# Patient Record
Sex: MALE | Race: WHITE | Employment: FULL TIME | URBAN - METROPOLITAN AREA
[De-identification: names, ages, dates, MRNs, and addresses within clinical notes are randomized per-mention and may not be internally consistent; named-entity substitution may affect disease eponyms.]

---

## 2017-02-23 ENCOUNTER — OFFICE VISIT (OUTPATIENT)
Dept: FAMILY MEDICINE CLINIC | Age: 47
End: 2017-02-23

## 2017-02-23 VITALS
SYSTOLIC BLOOD PRESSURE: 114 MMHG | HEIGHT: 73 IN | DIASTOLIC BLOOD PRESSURE: 78 MMHG | BODY MASS INDEX: 38.97 KG/M2 | WEIGHT: 294 LBS | TEMPERATURE: 97.1 F | HEART RATE: 100 BPM

## 2017-02-23 DIAGNOSIS — R53.83 MALAISE AND FATIGUE: ICD-10-CM

## 2017-02-23 DIAGNOSIS — G47.00 INSOMNIA, UNSPECIFIED TYPE: ICD-10-CM

## 2017-02-23 DIAGNOSIS — J20.9 ACUTE BRONCHITIS, UNSPECIFIED ORGANISM: ICD-10-CM

## 2017-02-23 DIAGNOSIS — R09.81 NASAL CONGESTION: Primary | ICD-10-CM

## 2017-02-23 DIAGNOSIS — R53.81 MALAISE AND FATIGUE: ICD-10-CM

## 2017-02-23 DIAGNOSIS — Z13.6 SCREENING FOR CARDIOVASCULAR CONDITION: ICD-10-CM

## 2017-02-23 DIAGNOSIS — Z12.5 PROSTATE CANCER SCREENING: ICD-10-CM

## 2017-02-23 PROCEDURE — 99213 OFFICE O/P EST LOW 20 MIN: CPT | Performed by: FAMILY MEDICINE

## 2017-02-23 RX ORDER — METHYLPREDNISOLONE 4 MG/1
TABLET ORAL
Qty: 1 KIT | Refills: 0 | Status: SHIPPED | OUTPATIENT
Start: 2017-02-23 | End: 2017-04-15 | Stop reason: ALTCHOICE

## 2017-02-23 RX ORDER — AZITHROMYCIN 250 MG/1
TABLET, FILM COATED ORAL
Qty: 1 PACKET | Refills: 0 | Status: SHIPPED | OUTPATIENT
Start: 2017-02-23 | End: 2017-03-02 | Stop reason: ALTCHOICE

## 2017-02-23 ASSESSMENT — ENCOUNTER SYMPTOMS
SORE THROAT: 0
EYE DISCHARGE: 1
CONSTIPATION: 0
ABDOMINAL PAIN: 0
COUGH: 1
EYE ITCHING: 1
DIARRHEA: 0
RHINORRHEA: 1

## 2017-02-27 DIAGNOSIS — Z12.5 PROSTATE CANCER SCREENING: ICD-10-CM

## 2017-02-27 DIAGNOSIS — R53.83 MALAISE AND FATIGUE: ICD-10-CM

## 2017-02-27 DIAGNOSIS — Z13.6 SCREENING FOR CARDIOVASCULAR CONDITION: ICD-10-CM

## 2017-02-27 DIAGNOSIS — R53.81 MALAISE AND FATIGUE: ICD-10-CM

## 2017-02-27 LAB
ALBUMIN SERPL-MCNC: 4.5 G/DL (ref 3.9–4.9)
ALP BLD-CCNC: 65 U/L (ref 35–104)
ALT SERPL-CCNC: 31 U/L (ref 0–41)
ANION GAP SERPL CALCULATED.3IONS-SCNC: 10 MEQ/L (ref 7–13)
AST SERPL-CCNC: 18 U/L (ref 0–40)
BASOPHILS ABSOLUTE: 0 K/UL (ref 0–0.2)
BASOPHILS RELATIVE PERCENT: 0.5 %
BILIRUB SERPL-MCNC: 0.5 MG/DL (ref 0–1.2)
BUN BLDV-MCNC: 18 MG/DL (ref 6–20)
CALCIUM SERPL-MCNC: 9.7 MG/DL (ref 8.6–10.2)
CHLORIDE BLD-SCNC: 98 MEQ/L (ref 98–107)
CHOLESTEROL, TOTAL: 202 MG/DL (ref 0–199)
CO2: 29 MEQ/L (ref 22–29)
CREAT SERPL-MCNC: 1.05 MG/DL (ref 0.7–1.2)
EOSINOPHILS ABSOLUTE: 0 K/UL (ref 0–0.7)
EOSINOPHILS RELATIVE PERCENT: 0.5 %
GFR AFRICAN AMERICAN: >60
GFR NON-AFRICAN AMERICAN: >60
GLOBULIN: 3 G/DL (ref 2.3–3.5)
GLUCOSE BLD-MCNC: 81 MG/DL (ref 74–109)
HCT VFR BLD CALC: 47.5 % (ref 42–52)
HDLC SERPL-MCNC: 40 MG/DL (ref 40–59)
HEMOGLOBIN: 16 G/DL (ref 14–18)
LDL CHOLESTEROL CALCULATED: 111 MG/DL (ref 0–129)
LYMPHOCYTES ABSOLUTE: 1.9 K/UL (ref 1–4.8)
LYMPHOCYTES RELATIVE PERCENT: 18.9 %
MCH RBC QN AUTO: 27.8 PG (ref 27–31.3)
MCHC RBC AUTO-ENTMCNC: 33.7 % (ref 33–37)
MCV RBC AUTO: 82.5 FL (ref 80–100)
MONOCYTES ABSOLUTE: 1 K/UL (ref 0.2–0.8)
MONOCYTES RELATIVE PERCENT: 9.7 %
NEUTROPHILS ABSOLUTE: 6.9 K/UL (ref 1.4–6.5)
NEUTROPHILS RELATIVE PERCENT: 70.4 %
PDW BLD-RTO: 14.2 % (ref 11.5–14.5)
PLATELET # BLD: 207 K/UL (ref 130–400)
POTASSIUM SERPL-SCNC: 4.2 MEQ/L (ref 3.5–5.1)
PROSTATE SPECIFIC ANTIGEN: 0.28 NG/ML
RBC # BLD: 5.76 M/UL (ref 4.7–6.1)
SLIDE REVIEW: ABNORMAL
SODIUM BLD-SCNC: 137 MEQ/L (ref 132–144)
T4 FREE: 0.89 NG/DL (ref 0.93–1.7)
TOTAL PROTEIN: 7.5 G/DL (ref 6.4–8.1)
TRIGL SERPL-MCNC: 255 MG/DL (ref 0–200)
WBC # BLD: 9.8 K/UL (ref 4.8–10.8)

## 2017-02-27 RX ORDER — MELOXICAM 15 MG/1
TABLET ORAL
Qty: 90 TABLET | Refills: 2 | Status: SHIPPED | OUTPATIENT
Start: 2017-02-27

## 2017-03-02 ENCOUNTER — OFFICE VISIT (OUTPATIENT)
Dept: FAMILY MEDICINE CLINIC | Age: 47
End: 2017-03-02

## 2017-03-02 VITALS
HEIGHT: 73 IN | RESPIRATION RATE: 16 BRPM | DIASTOLIC BLOOD PRESSURE: 82 MMHG | HEART RATE: 88 BPM | SYSTOLIC BLOOD PRESSURE: 118 MMHG | BODY MASS INDEX: 39.36 KG/M2 | TEMPERATURE: 97.3 F | WEIGHT: 297 LBS

## 2017-03-02 DIAGNOSIS — E03.9 ACQUIRED HYPOTHYROIDISM: ICD-10-CM

## 2017-03-02 DIAGNOSIS — E78.5 HYPERLIPIDEMIA, UNSPECIFIED HYPERLIPIDEMIA TYPE: Primary | ICD-10-CM

## 2017-03-02 DIAGNOSIS — E29.1 HYPOGONADISM, MALE: ICD-10-CM

## 2017-03-02 LAB — TESTOSTERONE TOTAL-MALE: 204 NG/DL (ref 300–890)

## 2017-03-02 PROCEDURE — 99213 OFFICE O/P EST LOW 20 MIN: CPT | Performed by: FAMILY MEDICINE

## 2017-03-02 RX ORDER — LEVOTHYROXINE SODIUM 0.05 MG/1
50 TABLET ORAL DAILY
Qty: 30 TABLET | Refills: 3 | Status: SHIPPED | OUTPATIENT
Start: 2017-03-02 | End: 2017-04-10

## 2017-03-02 ASSESSMENT — ENCOUNTER SYMPTOMS
ABDOMINAL PAIN: 0
SHORTNESS OF BREATH: 0
SINUS PRESSURE: 0
SORE THROAT: 0
DIARRHEA: 0
EYE DISCHARGE: 0
EYE ITCHING: 0
CONSTIPATION: 0
COUGH: 0

## 2017-03-02 ASSESSMENT — PATIENT HEALTH QUESTIONNAIRE - PHQ9
SUM OF ALL RESPONSES TO PHQ9 QUESTIONS 1 & 2: 0
2. FEELING DOWN, DEPRESSED OR HOPELESS: 0
1. LITTLE INTEREST OR PLEASURE IN DOING THINGS: 0
SUM OF ALL RESPONSES TO PHQ QUESTIONS 1-9: 0

## 2017-03-07 ENCOUNTER — NURSE ONLY (OUTPATIENT)
Dept: FAMILY MEDICINE CLINIC | Age: 47
End: 2017-03-07

## 2017-03-07 DIAGNOSIS — E29.1 HYPOGONADISM, MALE: Primary | ICD-10-CM

## 2017-03-07 PROCEDURE — 96372 THER/PROPH/DIAG INJ SC/IM: CPT | Performed by: FAMILY MEDICINE

## 2017-03-07 RX ORDER — TESTOSTERONE CYPIONATE 200 MG/ML
200 INJECTION INTRAMUSCULAR ONCE
Status: COMPLETED | OUTPATIENT
Start: 2017-03-07 | End: 2017-03-07

## 2017-03-07 RX ORDER — TESTOSTERONE CYPIONATE 200 MG/ML
200 INJECTION INTRAMUSCULAR
COMMUNITY
End: 2017-09-12 | Stop reason: SDUPTHER

## 2017-03-07 RX ADMIN — TESTOSTERONE CYPIONATE 200 MG: 200 INJECTION INTRAMUSCULAR at 12:02

## 2017-03-15 ENCOUNTER — TELEPHONE (OUTPATIENT)
Dept: FAMILY MEDICINE CLINIC | Age: 47
End: 2017-03-15

## 2017-03-31 ENCOUNTER — NURSE ONLY (OUTPATIENT)
Dept: FAMILY MEDICINE CLINIC | Age: 47
End: 2017-03-31

## 2017-03-31 DIAGNOSIS — E29.1 HYPOGONADISM, MALE: Primary | ICD-10-CM

## 2017-03-31 PROCEDURE — 96372 THER/PROPH/DIAG INJ SC/IM: CPT | Performed by: FAMILY MEDICINE

## 2017-03-31 RX ORDER — TESTOSTERONE CYPIONATE 200 MG/ML
200 INJECTION INTRAMUSCULAR ONCE
Status: COMPLETED | OUTPATIENT
Start: 2017-03-31 | End: 2017-03-31

## 2017-03-31 RX ADMIN — TESTOSTERONE CYPIONATE 200 MG: 200 INJECTION INTRAMUSCULAR at 13:24

## 2017-04-08 DIAGNOSIS — E03.9 ACQUIRED HYPOTHYROIDISM: ICD-10-CM

## 2017-04-08 DIAGNOSIS — E29.1 MALE HYPOGONADISM: Primary | ICD-10-CM

## 2017-04-08 DIAGNOSIS — E29.1 MALE HYPOGONADISM: ICD-10-CM

## 2017-04-08 LAB — T4 FREE: 1.04 NG/DL (ref 0.93–1.7)

## 2017-04-10 LAB — TESTOSTERONE TOTAL-MALE: 604 NG/DL (ref 300–890)

## 2017-04-10 RX ORDER — LEVOTHYROXINE SODIUM 0.07 MG/1
75 TABLET ORAL DAILY
Qty: 30 TABLET | Refills: 3 | Status: SHIPPED | OUTPATIENT
Start: 2017-04-10 | End: 2017-05-12 | Stop reason: SDUPTHER

## 2017-04-15 ENCOUNTER — OFFICE VISIT (OUTPATIENT)
Dept: FAMILY MEDICINE CLINIC | Age: 47
End: 2017-04-15

## 2017-04-15 VITALS
HEART RATE: 96 BPM | RESPIRATION RATE: 12 BRPM | BODY MASS INDEX: 40.77 KG/M2 | HEIGHT: 72 IN | DIASTOLIC BLOOD PRESSURE: 84 MMHG | TEMPERATURE: 97.8 F | WEIGHT: 301 LBS | SYSTOLIC BLOOD PRESSURE: 138 MMHG

## 2017-04-15 DIAGNOSIS — R09.89 CHEST CONGESTION: ICD-10-CM

## 2017-04-15 DIAGNOSIS — E29.1 HYPOGONADISM, MALE: Primary | ICD-10-CM

## 2017-04-15 DIAGNOSIS — B07.0 PLANTAR WART: ICD-10-CM

## 2017-04-15 PROCEDURE — 99213 OFFICE O/P EST LOW 20 MIN: CPT | Performed by: FAMILY MEDICINE

## 2017-04-15 ASSESSMENT — ENCOUNTER SYMPTOMS
COUGH: 0
NAUSEA: 0
DIARRHEA: 0
ABDOMINAL PAIN: 0
EYES NEGATIVE: 1
CONSTIPATION: 0
SHORTNESS OF BREATH: 0

## 2017-04-25 ENCOUNTER — HOSPITAL ENCOUNTER (EMERGENCY)
Age: 47
Discharge: HOME | End: 2017-04-25
Attending: EMERGENCY MEDICINE
Payer: COMMERCIAL

## 2017-04-25 VITALS
HEART RATE: 81 BPM | OXYGEN SATURATION: 96 % | WEIGHT: 305 LBS | BODY MASS INDEX: 40.42 KG/M2 | DIASTOLIC BLOOD PRESSURE: 101 MMHG | RESPIRATION RATE: 19 BRPM | HEIGHT: 73 IN | TEMPERATURE: 97.9 F | SYSTOLIC BLOOD PRESSURE: 164 MMHG

## 2017-04-25 DIAGNOSIS — M25.562 ACUTE PAIN OF LEFT KNEE: Primary | ICD-10-CM

## 2017-04-25 PROCEDURE — 99283 EMERGENCY DEPT VISIT LOW MDM: CPT

## 2017-04-25 PROCEDURE — 6360000002 HC RX W HCPCS: Performed by: EMERGENCY MEDICINE

## 2017-04-25 PROCEDURE — 6360000002 HC RX W HCPCS

## 2017-04-25 PROCEDURE — 96372 THER/PROPH/DIAG INJ SC/IM: CPT

## 2017-04-25 PROCEDURE — 6370000000 HC RX 637 (ALT 250 FOR IP): Performed by: EMERGENCY MEDICINE

## 2017-04-25 RX ORDER — ONDANSETRON 4 MG/1
TABLET, FILM COATED ORAL
Status: COMPLETED
Start: 2017-04-25 | End: 2017-04-25

## 2017-04-25 RX ORDER — MORPHINE SULFATE 4 MG/ML
4 INJECTION, SOLUTION INTRAMUSCULAR; INTRAVENOUS ONCE
Status: COMPLETED | OUTPATIENT
Start: 2017-04-25 | End: 2017-04-25

## 2017-04-25 RX ORDER — ONDANSETRON 2 MG/ML
4 INJECTION INTRAMUSCULAR; INTRAVENOUS ONCE
Status: DISCONTINUED | OUTPATIENT
Start: 2017-04-25 | End: 2017-04-25 | Stop reason: HOSPADM

## 2017-04-25 RX ORDER — MORPHINE SULFATE 15 MG/1
15 TABLET ORAL ONCE
Status: COMPLETED | OUTPATIENT
Start: 2017-04-25 | End: 2017-04-25

## 2017-04-25 RX ORDER — MORPHINE SULFATE 15 MG/1
15 TABLET ORAL EVERY 4 HOURS PRN
Qty: 12 TABLET | Refills: 0 | Status: SHIPPED | OUTPATIENT
Start: 2017-04-25 | End: 2017-05-02

## 2017-04-25 RX ORDER — ONDANSETRON 4 MG/1
4 TABLET, FILM COATED ORAL ONCE
Status: COMPLETED | OUTPATIENT
Start: 2017-04-25 | End: 2017-04-25

## 2017-04-25 RX ADMIN — ONDANSETRON 4 MG: 4 TABLET, FILM COATED ORAL at 01:38

## 2017-04-25 RX ADMIN — MORPHINE SULFATE 4 MG: 4 INJECTION, SOLUTION INTRAMUSCULAR; INTRAVENOUS at 01:40

## 2017-04-25 RX ADMIN — MORPHINE SULFATE 15 MG: 15 TABLET ORAL at 02:41

## 2017-04-25 ASSESSMENT — ENCOUNTER SYMPTOMS
ABDOMINAL PAIN: 0
BACK PAIN: 0
SHORTNESS OF BREATH: 0

## 2017-04-25 ASSESSMENT — PAIN SCALES - GENERAL
PAINLEVEL_OUTOF10: 8
PAINLEVEL_OUTOF10: 10
PAINLEVEL_OUTOF10: 10
PAINLEVEL_OUTOF10: 8

## 2017-04-25 ASSESSMENT — PAIN DESCRIPTION - ORIENTATION: ORIENTATION: LEFT;POSTERIOR

## 2017-04-25 ASSESSMENT — PAIN DESCRIPTION - LOCATION: LOCATION: KNEE

## 2017-04-27 DIAGNOSIS — M25.562 LEFT KNEE PAIN, UNSPECIFIED CHRONICITY: Primary | ICD-10-CM

## 2017-05-01 ENCOUNTER — OFFICE VISIT (OUTPATIENT)
Dept: ORTHOPEDIC SURGERY | Age: 47
End: 2017-05-01
Payer: COMMERCIAL

## 2017-05-01 VITALS — BODY MASS INDEX: 40.42 KG/M2 | WEIGHT: 305 LBS | HEIGHT: 73 IN

## 2017-05-01 DIAGNOSIS — S76.312A HAMSTRING STRAIN, LEFT, INITIAL ENCOUNTER: Primary | ICD-10-CM

## 2017-05-01 PROCEDURE — 99203 OFFICE O/P NEW LOW 30 MIN: CPT | Performed by: STUDENT IN AN ORGANIZED HEALTH CARE EDUCATION/TRAINING PROGRAM

## 2017-05-01 RX ORDER — METHYLPREDNISOLONE 4 MG/1
TABLET ORAL
Qty: 1 KIT | Refills: 0 | Status: SHIPPED | OUTPATIENT
Start: 2017-05-01 | End: 2017-05-07

## 2017-05-01 RX ORDER — IBUPROFEN 800 MG/1
800 TABLET ORAL EVERY 8 HOURS PRN
Qty: 90 TABLET | Refills: 0 | Status: SHIPPED | OUTPATIENT
Start: 2017-05-01 | End: 2017-05-25

## 2017-05-12 ENCOUNTER — OFFICE VISIT (OUTPATIENT)
Dept: INTERNAL MEDICINE | Age: 47
End: 2017-05-12
Payer: COMMERCIAL

## 2017-05-12 VITALS
HEIGHT: 73 IN | BODY MASS INDEX: 38.7 KG/M2 | DIASTOLIC BLOOD PRESSURE: 68 MMHG | OXYGEN SATURATION: 97 % | RESPIRATION RATE: 13 BRPM | WEIGHT: 292 LBS | HEART RATE: 85 BPM | SYSTOLIC BLOOD PRESSURE: 122 MMHG

## 2017-05-12 DIAGNOSIS — E03.9 ACQUIRED HYPOTHYROIDISM: Primary | ICD-10-CM

## 2017-05-12 DIAGNOSIS — E78.5 HYPERLIPIDEMIA, UNSPECIFIED HYPERLIPIDEMIA TYPE: ICD-10-CM

## 2017-05-12 DIAGNOSIS — J30.2 SEASONAL ALLERGIC RHINITIS, UNSPECIFIED ALLERGIC RHINITIS TRIGGER: ICD-10-CM

## 2017-05-12 PROCEDURE — 99203 OFFICE O/P NEW LOW 30 MIN: CPT | Performed by: INTERNAL MEDICINE

## 2017-05-12 RX ORDER — LEVOTHYROXINE SODIUM 0.07 MG/1
75 TABLET ORAL DAILY
Qty: 30 TABLET | Refills: 3 | Status: SHIPPED | OUTPATIENT
Start: 2017-05-12 | End: 2017-09-12 | Stop reason: SDUPTHER

## 2017-05-12 ASSESSMENT — ENCOUNTER SYMPTOMS: WHEEZING: 1

## 2017-05-15 ENCOUNTER — OFFICE VISIT (OUTPATIENT)
Dept: ORTHOPEDIC SURGERY | Age: 47
End: 2017-05-15
Payer: COMMERCIAL

## 2017-05-15 VITALS — WEIGHT: 292 LBS | HEIGHT: 73 IN | BODY MASS INDEX: 38.7 KG/M2

## 2017-05-15 DIAGNOSIS — S76.312A HAMSTRING STRAIN, LEFT, INITIAL ENCOUNTER: Primary | ICD-10-CM

## 2017-05-15 DIAGNOSIS — M25.562 LEFT KNEE PAIN, UNSPECIFIED CHRONICITY: ICD-10-CM

## 2017-05-15 PROCEDURE — 99213 OFFICE O/P EST LOW 20 MIN: CPT | Performed by: STUDENT IN AN ORGANIZED HEALTH CARE EDUCATION/TRAINING PROGRAM

## 2017-05-16 ASSESSMENT — ENCOUNTER SYMPTOMS
CHOKING: 0
VOMITING: 0
DIARRHEA: 0
APNEA: 0

## 2017-05-17 ENCOUNTER — HOSPITAL ENCOUNTER (OUTPATIENT)
Dept: PHYSICAL THERAPY | Age: 47
Setting detail: THERAPIES SERIES
Discharge: HOME | End: 2017-05-17
Payer: COMMERCIAL

## 2017-05-17 PROCEDURE — 97110 THERAPEUTIC EXERCISES: CPT

## 2017-05-17 PROCEDURE — 97161 PT EVAL LOW COMPLEX 20 MIN: CPT

## 2017-05-17 PROCEDURE — 97035 APP MDLTY 1+ULTRASOUND EA 15: CPT

## 2017-05-19 ENCOUNTER — OFFICE VISIT (OUTPATIENT)
Dept: INTERNAL MEDICINE | Age: 47
End: 2017-05-19
Payer: COMMERCIAL

## 2017-05-19 ENCOUNTER — HOSPITAL ENCOUNTER (OUTPATIENT)
Dept: PHYSICAL THERAPY | Age: 47
Setting detail: THERAPIES SERIES
Discharge: HOME | End: 2017-05-19
Payer: COMMERCIAL

## 2017-05-19 DIAGNOSIS — E29.1 HYPOGONADISM, MALE: Primary | ICD-10-CM

## 2017-05-19 PROCEDURE — 97110 THERAPEUTIC EXERCISES: CPT

## 2017-05-19 PROCEDURE — 96372 THER/PROPH/DIAG INJ SC/IM: CPT | Performed by: INTERNAL MEDICINE

## 2017-05-19 RX ORDER — TESTOSTERONE CYPIONATE 200 MG/ML
200 INJECTION INTRAMUSCULAR ONCE
Status: COMPLETED | OUTPATIENT
Start: 2017-05-19 | End: 2017-05-19

## 2017-05-19 RX ADMIN — TESTOSTERONE CYPIONATE 200 MG: 200 INJECTION INTRAMUSCULAR at 09:13

## 2017-05-22 ENCOUNTER — HOSPITAL ENCOUNTER (OUTPATIENT)
Dept: PHYSICAL THERAPY | Age: 47
Setting detail: THERAPIES SERIES
Discharge: HOME | End: 2017-05-22
Payer: COMMERCIAL

## 2017-05-22 PROCEDURE — 97110 THERAPEUTIC EXERCISES: CPT

## 2017-05-24 ENCOUNTER — HOSPITAL ENCOUNTER (OUTPATIENT)
Dept: PHYSICAL THERAPY | Age: 47
Setting detail: THERAPIES SERIES
Discharge: HOME | End: 2017-05-24
Payer: COMMERCIAL

## 2017-05-24 PROCEDURE — 97110 THERAPEUTIC EXERCISES: CPT

## 2017-05-25 ENCOUNTER — OFFICE VISIT (OUTPATIENT)
Dept: FAMILY MEDICINE CLINIC | Age: 47
End: 2017-05-25
Payer: COMMERCIAL

## 2017-05-25 VITALS
TEMPERATURE: 97.3 F | DIASTOLIC BLOOD PRESSURE: 88 MMHG | HEART RATE: 86 BPM | WEIGHT: 294.8 LBS | HEIGHT: 77 IN | BODY MASS INDEX: 34.81 KG/M2 | SYSTOLIC BLOOD PRESSURE: 128 MMHG

## 2017-05-25 DIAGNOSIS — E29.1 HYPOGONADISM, MALE: ICD-10-CM

## 2017-05-25 DIAGNOSIS — E78.00 PURE HYPERCHOLESTEROLEMIA: ICD-10-CM

## 2017-05-25 DIAGNOSIS — G47.33 OSA (OBSTRUCTIVE SLEEP APNEA): ICD-10-CM

## 2017-05-25 DIAGNOSIS — E03.9 ACQUIRED HYPOTHYROIDISM: Primary | ICD-10-CM

## 2017-05-25 PROCEDURE — 99214 OFFICE O/P EST MOD 30 MIN: CPT | Performed by: INTERNAL MEDICINE

## 2017-05-26 ENCOUNTER — HOSPITAL ENCOUNTER (OUTPATIENT)
Dept: PHYSICAL THERAPY | Age: 47
Setting detail: THERAPIES SERIES
Discharge: HOME | End: 2017-05-26
Payer: COMMERCIAL

## 2017-05-26 PROCEDURE — 97110 THERAPEUTIC EXERCISES: CPT

## 2017-05-26 PROCEDURE — 97035 APP MDLTY 1+ULTRASOUND EA 15: CPT

## 2017-05-31 ENCOUNTER — HOSPITAL ENCOUNTER (OUTPATIENT)
Dept: PHYSICAL THERAPY | Age: 47
Setting detail: THERAPIES SERIES
Discharge: HOME | End: 2017-05-31
Payer: COMMERCIAL

## 2017-05-31 PROCEDURE — 97110 THERAPEUTIC EXERCISES: CPT

## 2017-06-02 ENCOUNTER — HOSPITAL ENCOUNTER (OUTPATIENT)
Dept: PHYSICAL THERAPY | Age: 47
Setting detail: THERAPIES SERIES
Discharge: HOME | End: 2017-06-02
Payer: COMMERCIAL

## 2017-06-02 PROCEDURE — 97110 THERAPEUTIC EXERCISES: CPT

## 2017-06-05 ENCOUNTER — HOSPITAL ENCOUNTER (OUTPATIENT)
Dept: PHYSICAL THERAPY | Age: 47
Setting detail: THERAPIES SERIES
Discharge: HOME | End: 2017-06-05
Payer: COMMERCIAL

## 2017-06-05 PROCEDURE — 97110 THERAPEUTIC EXERCISES: CPT

## 2017-06-07 ENCOUNTER — HOSPITAL ENCOUNTER (OUTPATIENT)
Dept: PHYSICAL THERAPY | Age: 47
Setting detail: THERAPIES SERIES
Discharge: HOME | End: 2017-06-07
Payer: COMMERCIAL

## 2017-06-07 PROCEDURE — 97110 THERAPEUTIC EXERCISES: CPT

## 2017-06-09 ENCOUNTER — HOSPITAL ENCOUNTER (OUTPATIENT)
Dept: PHYSICAL THERAPY | Age: 47
Setting detail: THERAPIES SERIES
Discharge: HOME | End: 2017-06-09
Payer: COMMERCIAL

## 2017-06-09 PROCEDURE — 97110 THERAPEUTIC EXERCISES: CPT

## 2017-06-10 ENCOUNTER — HOSPITAL ENCOUNTER (OUTPATIENT)
Dept: SLEEP CENTER | Age: 47
Discharge: HOME | End: 2017-06-10
Payer: COMMERCIAL

## 2017-06-10 DIAGNOSIS — G47.33 OSA (OBSTRUCTIVE SLEEP APNEA): ICD-10-CM

## 2017-06-10 PROCEDURE — 95810 POLYSOM 6/> YRS 4/> PARAM: CPT

## 2017-06-10 ASSESSMENT — SLEEP AND FATIGUE QUESTIONNAIRES: NECK CIRCUMFERENCE (INCHES): 47

## 2017-06-11 VITALS — HEART RATE: 69 BPM | WEIGHT: 294 LBS | HEIGHT: 76 IN | BODY MASS INDEX: 35.8 KG/M2 | RESPIRATION RATE: 16 BRPM

## 2017-06-12 ENCOUNTER — OFFICE VISIT (OUTPATIENT)
Dept: ORTHOPEDIC SURGERY | Age: 47
End: 2017-06-12
Payer: COMMERCIAL

## 2017-06-12 ENCOUNTER — HOSPITAL ENCOUNTER (OUTPATIENT)
Dept: PHYSICAL THERAPY | Age: 47
Setting detail: THERAPIES SERIES
Discharge: HOME | End: 2017-06-12
Payer: COMMERCIAL

## 2017-06-12 VITALS — BODY MASS INDEX: 35.81 KG/M2 | HEIGHT: 76 IN | WEIGHT: 294.09 LBS

## 2017-06-12 DIAGNOSIS — S76.312A HAMSTRING STRAIN, LEFT, INITIAL ENCOUNTER: Primary | ICD-10-CM

## 2017-06-12 PROCEDURE — 99213 OFFICE O/P EST LOW 20 MIN: CPT | Performed by: STUDENT IN AN ORGANIZED HEALTH CARE EDUCATION/TRAINING PROGRAM

## 2017-06-12 PROCEDURE — 97110 THERAPEUTIC EXERCISES: CPT

## 2017-06-12 RX ORDER — IBUPROFEN 800 MG/1
800 TABLET ORAL EVERY 8 HOURS PRN
Qty: 120 TABLET | Refills: 2 | Status: SHIPPED | OUTPATIENT
Start: 2017-06-12 | End: 2018-01-09

## 2017-06-12 ASSESSMENT — ENCOUNTER SYMPTOMS: BACK PAIN: 0

## 2017-06-14 ENCOUNTER — HOSPITAL ENCOUNTER (OUTPATIENT)
Dept: PHYSICAL THERAPY | Age: 47
Setting detail: THERAPIES SERIES
Discharge: HOME | End: 2017-06-14
Payer: COMMERCIAL

## 2017-06-14 PROCEDURE — 97110 THERAPEUTIC EXERCISES: CPT

## 2017-06-23 ENCOUNTER — NURSE ONLY (OUTPATIENT)
Dept: FAMILY MEDICINE CLINIC | Age: 47
End: 2017-06-23
Payer: COMMERCIAL

## 2017-06-23 VITALS — BODY MASS INDEX: 35.87 KG/M2 | WEIGHT: 294.6 LBS | TEMPERATURE: 98.2 F | HEIGHT: 76 IN

## 2017-06-23 DIAGNOSIS — E29.1 HYPOGONADISM, MALE: Primary | ICD-10-CM

## 2017-06-23 PROCEDURE — 96372 THER/PROPH/DIAG INJ SC/IM: CPT | Performed by: INTERNAL MEDICINE

## 2017-06-23 RX ORDER — TESTOSTERONE CYPIONATE 200 MG/ML
200 INJECTION INTRAMUSCULAR ONCE
Status: COMPLETED | OUTPATIENT
Start: 2017-06-23 | End: 2017-06-23

## 2017-06-23 RX ADMIN — TESTOSTERONE CYPIONATE 200 MG: 200 INJECTION INTRAMUSCULAR at 09:23

## 2017-07-05 DIAGNOSIS — G47.33 OSA (OBSTRUCTIVE SLEEP APNEA): ICD-10-CM

## 2017-07-05 DIAGNOSIS — E03.9 ACQUIRED HYPOTHYROIDISM: ICD-10-CM

## 2017-07-17 ENCOUNTER — NURSE ONLY (OUTPATIENT)
Dept: FAMILY MEDICINE CLINIC | Age: 47
End: 2017-07-17
Payer: COMMERCIAL

## 2017-07-17 VITALS — BODY MASS INDEX: 35.8 KG/M2 | TEMPERATURE: 97.6 F | WEIGHT: 294 LBS

## 2017-07-17 DIAGNOSIS — E29.1 HYPOGONADISM, MALE: Primary | ICD-10-CM

## 2017-07-17 PROCEDURE — 96372 THER/PROPH/DIAG INJ SC/IM: CPT | Performed by: INTERNAL MEDICINE

## 2017-07-17 RX ADMIN — TESTOSTERONE CYPIONATE 200 MG: 200 INJECTION INTRAMUSCULAR at 11:30

## 2017-07-18 RX ORDER — TESTOSTERONE CYPIONATE 200 MG/ML
200 INJECTION INTRAMUSCULAR ONCE
Status: COMPLETED | OUTPATIENT
Start: 2017-07-18 | End: 2017-07-17

## 2017-07-25 ENCOUNTER — HOSPITAL ENCOUNTER (OUTPATIENT)
Dept: SLEEP CENTER | Age: 47
Discharge: HOME | End: 2017-07-25
Payer: COMMERCIAL

## 2017-07-25 VITALS — HEIGHT: 73 IN | BODY MASS INDEX: 38.7 KG/M2 | WEIGHT: 292 LBS

## 2017-07-25 PROCEDURE — 95811 POLYSOM 6/>YRS CPAP 4/> PARM: CPT

## 2017-08-01 ENCOUNTER — TELEPHONE (OUTPATIENT)
Dept: FAMILY MEDICINE CLINIC | Age: 47
End: 2017-08-01

## 2017-08-16 ENCOUNTER — TELEPHONE (OUTPATIENT)
Dept: FAMILY MEDICINE CLINIC | Age: 47
End: 2017-08-16

## 2017-08-17 ENCOUNTER — NURSE ONLY (OUTPATIENT)
Dept: FAMILY MEDICINE CLINIC | Age: 47
End: 2017-08-17
Payer: COMMERCIAL

## 2017-08-17 VITALS
SYSTOLIC BLOOD PRESSURE: 142 MMHG | WEIGHT: 287 LBS | RESPIRATION RATE: 16 BRPM | DIASTOLIC BLOOD PRESSURE: 78 MMHG | HEART RATE: 80 BPM | TEMPERATURE: 97.4 F | HEIGHT: 73 IN | BODY MASS INDEX: 38.04 KG/M2

## 2017-08-17 DIAGNOSIS — E29.1 HYPOGONADISM, MALE: Primary | ICD-10-CM

## 2017-08-18 ENCOUNTER — TELEPHONE (OUTPATIENT)
Dept: FAMILY MEDICINE CLINIC | Age: 47
End: 2017-08-18

## 2017-08-22 RX ORDER — TESTOSTERONE CYPIONATE 200 MG/ML
200 INJECTION INTRAMUSCULAR ONCE
Status: COMPLETED | OUTPATIENT
Start: 2017-08-22 | End: 2017-08-23

## 2017-08-23 PROCEDURE — 96372 THER/PROPH/DIAG INJ SC/IM: CPT | Performed by: INTERNAL MEDICINE

## 2017-08-23 RX ADMIN — TESTOSTERONE CYPIONATE 200 MG: 200 INJECTION INTRAMUSCULAR at 14:59

## 2017-09-12 ENCOUNTER — OFFICE VISIT (OUTPATIENT)
Dept: FAMILY MEDICINE CLINIC | Age: 47
End: 2017-09-12
Payer: COMMERCIAL

## 2017-09-12 VITALS
WEIGHT: 290.2 LBS | HEIGHT: 73 IN | BODY MASS INDEX: 38.46 KG/M2 | RESPIRATION RATE: 16 BRPM | SYSTOLIC BLOOD PRESSURE: 133 MMHG | HEART RATE: 73 BPM | DIASTOLIC BLOOD PRESSURE: 77 MMHG | TEMPERATURE: 97.4 F

## 2017-09-12 DIAGNOSIS — E29.1 HYPOGONADISM, MALE: ICD-10-CM

## 2017-09-12 DIAGNOSIS — E03.9 ACQUIRED HYPOTHYROIDISM: Primary | ICD-10-CM

## 2017-09-12 PROCEDURE — 96372 THER/PROPH/DIAG INJ SC/IM: CPT | Performed by: INTERNAL MEDICINE

## 2017-09-12 PROCEDURE — 99213 OFFICE O/P EST LOW 20 MIN: CPT | Performed by: INTERNAL MEDICINE

## 2017-09-12 RX ORDER — TESTOSTERONE CYPIONATE 200 MG/ML
200 INJECTION INTRAMUSCULAR ONCE
Status: COMPLETED | OUTPATIENT
Start: 2017-09-12 | End: 2017-09-12

## 2017-09-12 RX ORDER — LEVOTHYROXINE SODIUM 0.07 MG/1
75 TABLET ORAL DAILY
Qty: 30 TABLET | Refills: 3 | Status: SHIPPED | OUTPATIENT
Start: 2017-09-12 | End: 2017-12-18 | Stop reason: SDUPTHER

## 2017-09-13 RX ORDER — LEVOTHYROXINE SODIUM 0.07 MG/1
TABLET ORAL
Qty: 30 TABLET | Refills: 3 | Status: SHIPPED | OUTPATIENT
Start: 2017-09-13 | End: 2018-01-09

## 2017-10-19 ENCOUNTER — HOSPITAL ENCOUNTER (OUTPATIENT)
Age: 47
Discharge: HOME | End: 2017-10-19
Payer: COMMERCIAL

## 2017-10-19 ENCOUNTER — NURSE ONLY (OUTPATIENT)
Dept: FAMILY MEDICINE CLINIC | Age: 47
End: 2017-10-19
Payer: COMMERCIAL

## 2017-10-19 VITALS
BODY MASS INDEX: 38.45 KG/M2 | HEART RATE: 81 BPM | DIASTOLIC BLOOD PRESSURE: 69 MMHG | RESPIRATION RATE: 16 BRPM | SYSTOLIC BLOOD PRESSURE: 135 MMHG | HEIGHT: 73 IN | TEMPERATURE: 97.7 F | WEIGHT: 290.13 LBS

## 2017-10-19 DIAGNOSIS — E29.1 HYPOGONADISM, MALE: Primary | ICD-10-CM

## 2017-10-19 DIAGNOSIS — E03.9 ACQUIRED HYPOTHYROIDISM: ICD-10-CM

## 2017-10-19 DIAGNOSIS — E29.1 HYPOGONADISM, MALE: ICD-10-CM

## 2017-10-19 LAB
SEX HORMONE BINDING GLOBULIN: 14 NMOL/L (ref 11–80)
TESTOSTERONE FREE-NONMALE: 23.9 PG/ML (ref 47–244)
TESTOSTERONE TOTAL: 87 NG/DL (ref 220–1000)
TESTOSTERONE, BIOAVAILABLE: 56.1 NG/DL (ref 130–680)
TSH SERPL DL<=0.05 MIU/L-ACNC: 0.76 MIU/L (ref 0.3–5)

## 2017-10-19 PROCEDURE — 84443 ASSAY THYROID STIM HORMONE: CPT

## 2017-10-19 PROCEDURE — 36415 COLL VENOUS BLD VENIPUNCTURE: CPT

## 2017-10-19 PROCEDURE — 84403 ASSAY OF TOTAL TESTOSTERONE: CPT

## 2017-10-19 PROCEDURE — 84270 ASSAY OF SEX HORMONE GLOBUL: CPT

## 2017-10-19 RX ORDER — TESTOSTERONE CYPIONATE 200 MG/ML
200 INJECTION INTRAMUSCULAR ONCE
Status: CANCELLED | OUTPATIENT
Start: 2017-10-19 | End: 2017-10-19

## 2017-10-31 PROCEDURE — 96372 THER/PROPH/DIAG INJ SC/IM: CPT | Performed by: INTERNAL MEDICINE

## 2017-10-31 RX ORDER — TESTOSTERONE CYPIONATE 200 MG/ML
200 INJECTION INTRAMUSCULAR ONCE
Status: COMPLETED | OUTPATIENT
Start: 2017-10-31 | End: 2017-10-31

## 2017-10-31 RX ADMIN — TESTOSTERONE CYPIONATE 200 MG: 200 INJECTION INTRAMUSCULAR at 17:03

## 2017-11-07 ENCOUNTER — OFFICE VISIT (OUTPATIENT)
Dept: FAMILY MEDICINE CLINIC | Age: 47
End: 2017-11-07
Payer: COMMERCIAL

## 2017-11-07 VITALS
HEIGHT: 73 IN | RESPIRATION RATE: 18 BRPM | WEIGHT: 289 LBS | HEART RATE: 74 BPM | DIASTOLIC BLOOD PRESSURE: 69 MMHG | TEMPERATURE: 97.4 F | BODY MASS INDEX: 38.3 KG/M2 | SYSTOLIC BLOOD PRESSURE: 125 MMHG

## 2017-11-07 DIAGNOSIS — E29.1 HYPOGONADISM, MALE: Primary | ICD-10-CM

## 2017-11-07 PROCEDURE — 96372 THER/PROPH/DIAG INJ SC/IM: CPT | Performed by: INTERNAL MEDICINE

## 2017-11-07 PROCEDURE — 99213 OFFICE O/P EST LOW 20 MIN: CPT | Performed by: INTERNAL MEDICINE

## 2017-11-07 RX ORDER — TESTOSTERONE CYPIONATE 200 MG/ML
200 INJECTION INTRAMUSCULAR ONCE
Status: COMPLETED | OUTPATIENT
Start: 2017-11-07 | End: 2017-11-07

## 2017-11-07 RX ADMIN — TESTOSTERONE CYPIONATE 200 MG: 200 INJECTION INTRAMUSCULAR at 15:16

## 2017-11-07 ASSESSMENT — ENCOUNTER SYMPTOMS
ABDOMINAL PAIN: 0
HEMOPTYSIS: 0
NAUSEA: 0
COUGH: 0
DOUBLE VISION: 0
BLURRED VISION: 0
HEARTBURN: 0

## 2017-11-07 NOTE — PROGRESS NOTES
Have you seen any other physician or provider since your last visit no    Have you had any other diagnostic tests since your last visit? yes - BW    Have you changed or stopped any medications since your last visit including any over-the-counter medicines, vitamins, or herbal medicines? no     Are you taking all your prescribed medications? Yes  If NO, why? -  N/A           Patient Self-Management Goal for this visit.    What is your goal for your visit today? - Discuss medications   Barriers to success: none   Plan for overcoming my barriers: N/A      Confidence: 10/10   Date goal set: 11/7/17   Date expected to reach goal: 2days    Medical history Review  Past Medical, Family, and Social History reviewed and does not contribute to the patient presenting condition    Health Maintenance Due   Topic Date Due    HIV screen  02/03/1985

## 2017-11-07 NOTE — PROGRESS NOTES
[facility] PHYSICIANS  [facility] [place] [facility] [address] [place] [address]  Dept: [phone]  Dept Fax: [phone]    Office Progress/Follow Up Note  Date of patient's visit: 11/7/2017  Patient's Name:  Tricia Drummond YOB: 1970            Patient Care Team:  Madonna Corrigan MD as PCP - General (Internal Medicine)  Oralia Ramsey MD as PCP - [facility] Attributed Provider  Calvin Bae MD (Otolaryngology)  ================================================================    REASON FOR VISIT/CHIEF COMPLAINT:  Results (wants to discuss)    HISTORY OF PRESENTING ILLNESS:  History was obtained from: patient. Tricia Drummond is a 52 y.o. is here for a follow-up visit and review recent blood work. Patient has history of hypogonadism which manifest with symptoms of irritability and fatigue. He usually receives testosterone injections 200 µg monthly. He reports that he started to feel symptoms of fatigue and irritability usually at the third week after injections. He completed thrown level which shows low levels. Today he doesn't have any symptoms. He is asking for injection for testosterone. He has history of hypothyroidism which is controlled with levothyroxine.   [place] list, medications and blood work reviewed       Patient Active Problem List   Diagnosis    Seasonal allergies    Acquired hypothyroidism    Hypogonadism, male    Hyperlipidemia       Health Maintenance Due   Topic Date Due    HIV screen  02/03/1985       Allergies   Allergen Reactions    Aleve [Naproxen]      Causes nasal polyps         Current Outpatient Prescriptions   Medication Sig Dispense Refill    levothyroxine (SYNTHROID) 75 MCG tablet take 1 tablet by mouth once daily 30 tablet 3    levothyroxine (SYNTHROID) 75 MCG tablet Take 1 tablet by mouth Daily 30 tablet 3    PROAIR  (90 Base) MCG/ACT inhaler USE 2 INHALATIONS EVERY 6 HOURS AS NEEDED FOR WHEEZING 25.5 g 1    ibuprofen HENT:   Mouth/Throat: No oropharyngeal exudate. Neck: Normal range of motion. Neck supple. No thyromegaly present. Cardiovascular: Normal rate, regular rhythm, normal heart sounds and intact distal pulses. Pulmonary/Chest: Effort normal and breath sounds normal. No respiratory distress. He has no wheezes. Abdominal: Soft. Bowel sounds are normal. He exhibits no distension and no mass. There is no tenderness. Musculoskeletal: Normal range of motion. He exhibits no edema or tenderness. Neurological: He is alert and oriented to person, place, and time. No cranial nerve deficit. Skin: Skin is warm. No rash noted. He is not diaphoretic. No erythema. Psychiatric: Mood, memory, affect and judgment normal.         DIAGNOSTIC FINDINGS:  CBC:  Lab Results   Component Value Date    WBC 9.8 02/27/2017    HGB 16.0 02/27/2017     02/27/2017       BMP:    Lab Results   Component Value Date     02/27/2017    K 4.2 02/27/2017    CL 98 02/27/2017    CO2 29 02/27/2017    BUN 18 02/27/2017    CREATININE 1.05 02/27/2017    GLUCOSE 81 02/27/2017       HEMOGLOBIN A1C: No results found for: LABA1C    FASTING LIPID PANEL:  Lab Results   Component Value Date    CHOL 202 (H) 02/27/2017    HDL 40 02/27/2017    TRIG 255 (H) 02/27/2017       ASSESSMENT AND PLAN:  Keeley Kennedy was seen today for results. Diagnoses and all orders for this visit:    Hypogonadism, male  -     testosterone cypionate (DEPOTESTOTERONE CYPIONATE) injection 200 mg; Inject 1 mL into the muscle once . -     Testosterone; Future      FOLLOW UP AND INSTRUCTIONS:  · Return in about 3 months (around 2/7/2018). · Keeley Kennedy received counseling on the following healthy behaviors: nutrition and exercise    · Discussed use, benefit, and side effects of prescribed medications. Barriers to medication compliance addressed. All patient questions answered. Pt voiced understanding.      · Patient given educational materials - see patient instructions    Mbonu Kelsey Givens MD, BARBARA, [address] [place] [address] Internal Medicine Associate  11/7/2017, 3:26 PM    This note is created with the assistance of a speech-recognition program. While intending to generate a document that actually reflects the content of the visit, the document can still have some mistakes which may not have been identified and corrected by editing.

## 2017-12-04 ENCOUNTER — HOSPITAL ENCOUNTER (OUTPATIENT)
Age: 47
Discharge: HOME | End: 2017-12-04
Payer: COMMERCIAL

## 2017-12-04 DIAGNOSIS — E29.1 HYPOGONADISM, MALE: ICD-10-CM

## 2017-12-04 LAB — TESTOSTERONE TOTAL: 98 NG/DL (ref 220–1000)

## 2017-12-04 PROCEDURE — 36415 COLL VENOUS BLD VENIPUNCTURE: CPT

## 2017-12-04 PROCEDURE — 84403 ASSAY OF TOTAL TESTOSTERONE: CPT

## 2017-12-07 ENCOUNTER — NURSE ONLY (OUTPATIENT)
Dept: FAMILY MEDICINE CLINIC | Age: 47
End: 2017-12-07
Payer: COMMERCIAL

## 2017-12-07 VITALS
WEIGHT: 289.02 LBS | TEMPERATURE: 97.8 F | SYSTOLIC BLOOD PRESSURE: 139 MMHG | HEIGHT: 73 IN | HEART RATE: 87 BPM | BODY MASS INDEX: 38.31 KG/M2 | DIASTOLIC BLOOD PRESSURE: 82 MMHG | RESPIRATION RATE: 16 BRPM

## 2017-12-07 DIAGNOSIS — E29.1 HYPOGONADISM, MALE: Primary | ICD-10-CM

## 2017-12-07 PROCEDURE — 99212 OFFICE O/P EST SF 10 MIN: CPT | Performed by: INTERNAL MEDICINE

## 2017-12-07 PROCEDURE — 96372 THER/PROPH/DIAG INJ SC/IM: CPT | Performed by: INTERNAL MEDICINE

## 2017-12-07 RX ORDER — TESTOSTERONE CYPIONATE 200 MG/ML
200 INJECTION INTRAMUSCULAR ONCE
Status: COMPLETED | OUTPATIENT
Start: 2017-12-07 | End: 2017-12-07

## 2017-12-07 RX ADMIN — TESTOSTERONE CYPIONATE 200 MG: 200 INJECTION INTRAMUSCULAR at 12:41

## 2017-12-18 DIAGNOSIS — E29.1 HYPOGONADISM, MALE: ICD-10-CM

## 2017-12-18 RX ORDER — LEVOTHYROXINE SODIUM 0.07 MG/1
75 TABLET ORAL DAILY
Qty: 30 TABLET | Refills: 3 | Status: SHIPPED | OUTPATIENT
Start: 2017-12-18

## 2018-01-09 ENCOUNTER — OFFICE VISIT (OUTPATIENT)
Dept: FAMILY MEDICINE CLINIC | Age: 48
End: 2018-01-09
Payer: COMMERCIAL

## 2018-01-09 VITALS
BODY MASS INDEX: 37.59 KG/M2 | SYSTOLIC BLOOD PRESSURE: 130 MMHG | WEIGHT: 283.6 LBS | HEIGHT: 73 IN | TEMPERATURE: 97.6 F | DIASTOLIC BLOOD PRESSURE: 80 MMHG | HEART RATE: 73 BPM | RESPIRATION RATE: 16 BRPM

## 2018-01-09 DIAGNOSIS — M54.50 CHRONIC BILATERAL LOW BACK PAIN WITHOUT SCIATICA: ICD-10-CM

## 2018-01-09 DIAGNOSIS — G89.29 CHRONIC BILATERAL LOW BACK PAIN WITHOUT SCIATICA: ICD-10-CM

## 2018-01-09 DIAGNOSIS — E29.1 HYPOGONADISM, MALE: Primary | ICD-10-CM

## 2018-01-09 DIAGNOSIS — K58.0 IRRITABLE BOWEL SYNDROME WITH DIARRHEA: ICD-10-CM

## 2018-01-09 DIAGNOSIS — J45.20 MILD INTERMITTENT ASTHMA WITHOUT COMPLICATION: ICD-10-CM

## 2018-01-09 PROCEDURE — 99214 OFFICE O/P EST MOD 30 MIN: CPT | Performed by: INTERNAL MEDICINE

## 2018-01-09 PROCEDURE — 96372 THER/PROPH/DIAG INJ SC/IM: CPT | Performed by: INTERNAL MEDICINE

## 2018-01-09 RX ORDER — MELOXICAM 15 MG/1
15 TABLET ORAL DAILY
Qty: 90 TABLET | Refills: 1 | Status: ON HOLD | OUTPATIENT
Start: 2018-01-09 | End: 2021-01-01

## 2018-01-09 RX ORDER — ALBUTEROL SULFATE 90 UG/1
2 AEROSOL, METERED RESPIRATORY (INHALATION) EVERY 6 HOURS PRN
Qty: 1 INHALER | Refills: 2 | Status: SHIPPED | OUTPATIENT
Start: 2018-01-09

## 2018-01-09 RX ORDER — TESTOSTERONE CYPIONATE 200 MG/ML
400 INJECTION INTRAMUSCULAR ONCE
Status: COMPLETED | OUTPATIENT
Start: 2018-01-09 | End: 2018-01-09

## 2018-01-09 RX ORDER — DICYCLOMINE HCL 20 MG
20 TABLET ORAL 3 TIMES DAILY
Qty: 90 TABLET | Refills: 2 | Status: ON HOLD | OUTPATIENT
Start: 2018-01-09 | End: 2021-01-01

## 2018-01-09 RX ADMIN — TESTOSTERONE CYPIONATE 400 MG: 200 INJECTION INTRAMUSCULAR at 18:37

## 2018-01-09 NOTE — LETTER
[facility]  [address] [place] [address]  Phone: [phone]  Fax: [phone]    Madonna Levy MD        January 9, 2018     Patient: Dayna Morales   YOB: 1970   Date of Visit: 1/9/2018       To Whom it May Concern:    Dayna Morales was seen in my clinic on 1/9/2018. He may return to work on 01/10/18. If you have any questions or concerns, please don't hesitate to call.     Sincerely,         Madonna Levy MD

## 2018-01-10 ASSESSMENT — ENCOUNTER SYMPTOMS
COUGH: 0
BLURRED VISION: 0
DOUBLE VISION: 0
HEARTBURN: 0
ABDOMINAL PAIN: 0
HEMOPTYSIS: 0
NAUSEA: 0

## 2018-01-10 NOTE — PROGRESS NOTES
visit:    Hypogonadism, male  -     testosterone cypionate (DEPOTESTOTERONE CYPIONATE) injection 400 mg; Inject 2 mLs into the muscle once. Chronic bilateral low back pain without sciatica  -     meloxicam (MOBIC) 15 MG tablet; Take 1 tablet by mouth daily    Mild intermittent asthma without complication  -     mometasone-formoterol (DULERA) 200-5 MCG/ACT inhaler; Inhale 1 puff into the lungs 2 times daily  -     albuterol sulfate HFA (PROAIR HFA) 108 (90 Base) MCG/ACT inhaler; Inhale 2 puffs into the lungs every 6 hours as needed for Wheezing    Irritable bowel syndrome with diarrhea  -     dicyclomine (BENTYL) 20 MG tablet; Take 1 tablet by mouth 3 times daily      FOLLOW UP AND INSTRUCTIONS:  · Return in about 4 weeks (around 2/6/2018) for Testosterone injection. · Angelique Robbins received counseling on the following healthy behaviors: nutrition and exercise    · Discussed use, benefit, and side effects of prescribed medications. Barriers to medication compliance addressed. All patient questions answered. Pt voiced understanding. · Patient given educational materials - see patient instructions    Madonna Andrade MD, BARBARA, [address] [place] [address] Internal Medicine Associate  1/10/2018, 5:19 PM    This note is created with the assistance of a speech-recognition program. While intending to generate a document that actually reflects the content of the visit, the document can still have some mistakes which may not have been identified and corrected by editing.

## 2018-01-18 ENCOUNTER — TELEPHONE (OUTPATIENT)
Dept: FAMILY MEDICINE CLINIC | Age: 48
End: 2018-01-18

## 2018-01-18 NOTE — TELEPHONE ENCOUNTER
[facility] called requesting clarification on directions for [facility]. Patient was using 2 inhalation every 2 hours previously and the current directions are 1 inhalation twice daily. Please advise thank you!       Next Visit Date:    Last ov 1/9/18    Future Appointments  Date Time Provider Courtney Townsend   2/8/2018 9:40 AM MD Tristin Medel 55 Maintenance   Topic Date Due    HIV screen  02/03/1985    Pneumococcal med risk (1 of 1 - PPSV23) 02/03/1989    Flu vaccine (1) 11/07/2018 (Originally 9/1/2017)    TSH testing  10/19/2018    DTaP/Tdap/Td vaccine (2 - Td) 05/01/2020    Lipid screen  02/27/2022       No results found for: LABA1C          ( goal A1C is < 7)   No results found for: LABMICR  LDL Calculated (mg/dL)   Date Value   02/27/2017 111       (goal LDL is <100)   AST (U/L)   Date Value   02/27/2017 18     ALT (U/L)   Date Value   02/27/2017 31     BUN (mg/dL)   Date Value   02/27/2017 18     BP Readings from Last 3 Encounters:   01/09/18 130/80   12/07/17 139/82   11/07/17 125/69          (goal 120/80)    All Future Testing planned in [facility]              Patient Active Problem List:     Seasonal allergies     Acquired hypothyroidism     Hypogonadism, male     Hyperlipidemia     Chronic bilateral low back pain without sciatica     Mild intermittent asthma without complication     Irritable bowel syndrome with diarrhea

## 2021-01-01 ENCOUNTER — APPOINTMENT (OUTPATIENT)
Dept: GENERAL RADIOLOGY | Age: 51
DRG: 207 | End: 2021-01-01
Payer: COMMERCIAL

## 2021-01-01 ENCOUNTER — CARE COORDINATION (OUTPATIENT)
Dept: CARE COORDINATION | Age: 51
End: 2021-01-01

## 2021-01-01 ENCOUNTER — APPOINTMENT (OUTPATIENT)
Dept: ULTRASOUND IMAGING | Age: 51
DRG: 207 | End: 2021-01-01
Payer: COMMERCIAL

## 2021-01-01 ENCOUNTER — APPOINTMENT (OUTPATIENT)
Dept: CT IMAGING | Age: 51
DRG: 207 | End: 2021-01-01
Payer: COMMERCIAL

## 2021-01-01 ENCOUNTER — HOSPITAL ENCOUNTER (INPATIENT)
Age: 51
LOS: 14 days | DRG: 207 | End: 2021-10-15
Attending: EMERGENCY MEDICINE | Admitting: INTERNAL MEDICINE
Payer: COMMERCIAL

## 2021-01-01 ENCOUNTER — HOSPITAL ENCOUNTER (EMERGENCY)
Age: 51
Discharge: HOME | DRG: 207 | End: 2021-09-29
Attending: EMERGENCY MEDICINE
Payer: COMMERCIAL

## 2021-01-01 VITALS
DIASTOLIC BLOOD PRESSURE: 87 MMHG | OXYGEN SATURATION: 96 % | BODY MASS INDEX: 38.43 KG/M2 | SYSTOLIC BLOOD PRESSURE: 146 MMHG | TEMPERATURE: 98.2 F | WEIGHT: 290 LBS | HEIGHT: 73 IN | RESPIRATION RATE: 18 BRPM | HEART RATE: 97 BPM

## 2021-01-01 VITALS
BODY MASS INDEX: 38.31 KG/M2 | SYSTOLIC BLOOD PRESSURE: 134 MMHG | WEIGHT: 289.02 LBS | HEART RATE: 21 BPM | DIASTOLIC BLOOD PRESSURE: 21 MMHG | OXYGEN SATURATION: 47 % | TEMPERATURE: 100 F | HEIGHT: 73 IN

## 2021-01-01 DIAGNOSIS — U07.1 PNEUMONIA DUE TO 2019 NOVEL CORONAVIRUS: ICD-10-CM

## 2021-01-01 DIAGNOSIS — J12.82 PNEUMONIA DUE TO 2019 NOVEL CORONAVIRUS: ICD-10-CM

## 2021-01-01 DIAGNOSIS — G89.29 CHRONIC BILATERAL LOW BACK PAIN WITHOUT SCIATICA: ICD-10-CM

## 2021-01-01 DIAGNOSIS — R06.00 DYSPNEA, UNSPECIFIED TYPE: ICD-10-CM

## 2021-01-01 DIAGNOSIS — U07.1 PNEUMONIA DUE TO COVID-19 VIRUS: Primary | ICD-10-CM

## 2021-01-01 DIAGNOSIS — M54.50 CHRONIC BILATERAL LOW BACK PAIN WITHOUT SCIATICA: ICD-10-CM

## 2021-01-01 DIAGNOSIS — R09.02 HYPOXIA: Primary | ICD-10-CM

## 2021-01-01 DIAGNOSIS — J12.82 PNEUMONIA DUE TO COVID-19 VIRUS: Primary | ICD-10-CM

## 2021-01-01 DIAGNOSIS — J93.83 OTHER PNEUMOTHORAX: ICD-10-CM

## 2021-01-01 LAB
ALBUMIN SERPL-MCNC: 2.4 G/DL (ref 3.5–4.6)
ALBUMIN SERPL-MCNC: 2.5 G/DL (ref 3.5–4.6)
ALBUMIN SERPL-MCNC: 2.7 G/DL (ref 3.5–4.6)
ALBUMIN SERPL-MCNC: 2.8 G/DL (ref 3.5–4.6)
ALBUMIN SERPL-MCNC: 3 G/DL (ref 3.5–4.6)
ALBUMIN SERPL-MCNC: 3 G/DL (ref 3.5–4.6)
ALBUMIN SERPL-MCNC: 3.1 G/DL (ref 3.5–4.6)
ALBUMIN SERPL-MCNC: 3.4 G/DL (ref 3.5–4.6)
ALBUMIN SERPL-MCNC: 3.4 G/DL (ref 3.5–4.6)
ALBUMIN SERPL-MCNC: 3.6 G/DL (ref 3.5–4.6)
ALBUMIN SERPL-MCNC: 3.6 G/DL (ref 3.5–4.6)
ALBUMIN SERPL-MCNC: 3.7 G/DL (ref 3.5–4.6)
ALP BLD-CCNC: 102 U/L (ref 35–104)
ALP BLD-CCNC: 110 U/L (ref 35–104)
ALP BLD-CCNC: 111 U/L (ref 35–104)
ALP BLD-CCNC: 112 U/L (ref 35–104)
ALP BLD-CCNC: 126 U/L (ref 35–104)
ALP BLD-CCNC: 64 U/L (ref 35–104)
ALP BLD-CCNC: 65 U/L (ref 35–104)
ALP BLD-CCNC: 65 U/L (ref 35–104)
ALP BLD-CCNC: 66 U/L (ref 35–104)
ALP BLD-CCNC: 66 U/L (ref 35–104)
ALP BLD-CCNC: 67 U/L (ref 35–104)
ALP BLD-CCNC: 74 U/L (ref 35–104)
ALP BLD-CCNC: 76 U/L (ref 35–104)
ALP BLD-CCNC: 87 U/L (ref 35–104)
ALP BLD-CCNC: 88 U/L (ref 35–104)
ALP BLD-CCNC: 92 U/L (ref 35–104)
ALP BLD-CCNC: 95 U/L (ref 35–104)
ALT SERPL-CCNC: 100 U/L (ref 0–41)
ALT SERPL-CCNC: 1247 U/L (ref 0–41)
ALT SERPL-CCNC: 18 U/L (ref 0–41)
ALT SERPL-CCNC: 18 U/L (ref 0–41)
ALT SERPL-CCNC: 24 U/L (ref 0–41)
ALT SERPL-CCNC: 27 U/L (ref 0–41)
ALT SERPL-CCNC: 27 U/L (ref 0–41)
ALT SERPL-CCNC: 28 U/L (ref 0–41)
ALT SERPL-CCNC: 30 U/L (ref 0–41)
ALT SERPL-CCNC: 30 U/L (ref 0–41)
ALT SERPL-CCNC: 33 U/L (ref 0–41)
ALT SERPL-CCNC: 347 U/L (ref 0–41)
ALT SERPL-CCNC: 37 U/L (ref 0–41)
ALT SERPL-CCNC: 41 U/L (ref 0–41)
ALT SERPL-CCNC: 46 U/L (ref 0–41)
ALT SERPL-CCNC: 52 U/L (ref 0–41)
ALT SERPL-CCNC: 64 U/L (ref 0–41)
ANION GAP SERPL CALCULATED.3IONS-SCNC: 10 MEQ/L (ref 9–15)
ANION GAP SERPL CALCULATED.3IONS-SCNC: 11 MEQ/L (ref 9–15)
ANION GAP SERPL CALCULATED.3IONS-SCNC: 11 MEQ/L (ref 9–15)
ANION GAP SERPL CALCULATED.3IONS-SCNC: 12 MEQ/L (ref 9–15)
ANION GAP SERPL CALCULATED.3IONS-SCNC: 13 MEQ/L (ref 9–15)
ANION GAP SERPL CALCULATED.3IONS-SCNC: 13 MEQ/L (ref 9–15)
ANION GAP SERPL CALCULATED.3IONS-SCNC: 14 MEQ/L (ref 9–15)
ANION GAP SERPL CALCULATED.3IONS-SCNC: 16 MEQ/L (ref 9–15)
ANION GAP SERPL CALCULATED.3IONS-SCNC: 16 MEQ/L (ref 9–15)
ANION GAP SERPL CALCULATED.3IONS-SCNC: 4 MEQ/L (ref 9–15)
ANION GAP SERPL CALCULATED.3IONS-SCNC: 5 MEQ/L (ref 9–15)
ANION GAP SERPL CALCULATED.3IONS-SCNC: 6 MEQ/L (ref 9–15)
ANION GAP SERPL CALCULATED.3IONS-SCNC: 6 MEQ/L (ref 9–15)
ANISOCYTOSIS: ABNORMAL
ANTI-XA UNFRAC HEPARIN: 0.87 IU/ML
ANTI-XA UNFRAC HEPARIN: 1.54 IU/ML
ANTI-XA UNFRAC HEPARIN: <0.1 IU/ML
APTT: >150 SEC (ref 24.4–36.8)
AST SERPL-CCNC: 142 U/L (ref 0–40)
AST SERPL-CCNC: 2681 U/L (ref 0–40)
AST SERPL-CCNC: 32 U/L (ref 0–40)
AST SERPL-CCNC: 33 U/L (ref 0–40)
AST SERPL-CCNC: 34 U/L (ref 0–40)
AST SERPL-CCNC: 34 U/L (ref 0–40)
AST SERPL-CCNC: 37 U/L (ref 0–40)
AST SERPL-CCNC: 39 U/L (ref 0–40)
AST SERPL-CCNC: 40 U/L (ref 0–40)
AST SERPL-CCNC: 42 U/L (ref 0–40)
AST SERPL-CCNC: 47 U/L (ref 0–40)
AST SERPL-CCNC: 48 U/L (ref 0–40)
AST SERPL-CCNC: 508 U/L (ref 0–40)
AST SERPL-CCNC: 51 U/L (ref 0–40)
AST SERPL-CCNC: 56 U/L (ref 0–40)
ATYPICAL LYMPHOCYTE RELATIVE PERCENT: 1 %
ATYPICAL LYMPHOCYTE RELATIVE PERCENT: 3 %
BANDED NEUTROPHILS RELATIVE PERCENT: 2 %
BANDED NEUTROPHILS RELATIVE PERCENT: 9 %
BASE EXCESS ARTERIAL: -1 (ref -3–3)
BASE EXCESS ARTERIAL: -3 (ref -3–3)
BASE EXCESS ARTERIAL: -4 (ref -3–3)
BASE EXCESS ARTERIAL: 0 (ref -3–3)
BASE EXCESS ARTERIAL: 1 (ref -3–3)
BASE EXCESS ARTERIAL: 10 (ref -3–3)
BASE EXCESS ARTERIAL: 11 (ref -3–3)
BASE EXCESS ARTERIAL: 12 (ref -3–3)
BASE EXCESS ARTERIAL: 13 (ref -3–3)
BASE EXCESS ARTERIAL: 13 (ref -3–3)
BASE EXCESS ARTERIAL: 14 (ref -3–3)
BASE EXCESS ARTERIAL: 14 (ref -3–3)
BASE EXCESS ARTERIAL: 16 (ref -3–3)
BASE EXCESS ARTERIAL: 16 (ref -3–3)
BASE EXCESS ARTERIAL: 17 (ref -3–3)
BASE EXCESS ARTERIAL: 2 (ref -3–3)
BASE EXCESS ARTERIAL: 20 (ref -3–3)
BASE EXCESS ARTERIAL: 3 (ref -3–3)
BASE EXCESS ARTERIAL: 4 (ref -3–3)
BASE EXCESS ARTERIAL: 5 (ref -3–3)
BASE EXCESS ARTERIAL: 6 (ref -3–3)
BASE EXCESS ARTERIAL: 8 (ref -3–3)
BASOPHILS ABSOLUTE: 0 K/UL (ref 0–0.2)
BASOPHILS ABSOLUTE: 0.1 K/UL (ref 0–0.2)
BASOPHILS ABSOLUTE: 0.1 K/UL (ref 0–0.2)
BASOPHILS ABSOLUTE: 0.2 K/UL (ref 0–0.2)
BASOPHILS RELATIVE PERCENT: 0 %
BASOPHILS RELATIVE PERCENT: 0.1 %
BASOPHILS RELATIVE PERCENT: 0.2 %
BASOPHILS RELATIVE PERCENT: 0.3 %
BASOPHILS RELATIVE PERCENT: 0.3 %
BASOPHILS RELATIVE PERCENT: 0.4 %
BASOPHILS RELATIVE PERCENT: 0.5 %
BASOPHILS RELATIVE PERCENT: 0.6 %
BASOPHILS RELATIVE PERCENT: 0.6 %
BASOPHILS RELATIVE PERCENT: 1 %
BILIRUB SERPL-MCNC: 0.3 MG/DL (ref 0.2–0.7)
BILIRUB SERPL-MCNC: 0.4 MG/DL (ref 0.2–0.7)
BILIRUB SERPL-MCNC: 0.5 MG/DL (ref 0.2–0.7)
BILIRUB SERPL-MCNC: 0.6 MG/DL (ref 0.2–0.7)
BILIRUB SERPL-MCNC: 0.7 MG/DL (ref 0.2–0.7)
BILIRUB SERPL-MCNC: 0.8 MG/DL (ref 0.2–0.7)
BLOOD CULTURE, ROUTINE: NORMAL
BUN BLDV-MCNC: 14 MG/DL (ref 6–20)
BUN BLDV-MCNC: 20 MG/DL (ref 6–20)
BUN BLDV-MCNC: 21 MG/DL (ref 6–20)
BUN BLDV-MCNC: 21 MG/DL (ref 6–20)
BUN BLDV-MCNC: 22 MG/DL (ref 6–20)
BUN BLDV-MCNC: 23 MG/DL (ref 6–20)
BUN BLDV-MCNC: 23 MG/DL (ref 6–20)
BUN BLDV-MCNC: 24 MG/DL (ref 6–20)
BUN BLDV-MCNC: 24 MG/DL (ref 6–20)
BUN BLDV-MCNC: 25 MG/DL (ref 6–20)
BUN BLDV-MCNC: 26 MG/DL (ref 6–20)
BUN BLDV-MCNC: 33 MG/DL (ref 6–20)
BUN BLDV-MCNC: 35 MG/DL (ref 6–20)
BUN BLDV-MCNC: 39 MG/DL (ref 6–20)
BUN BLDV-MCNC: 40 MG/DL (ref 6–20)
BUN BLDV-MCNC: 41 MG/DL (ref 6–20)
BUN BLDV-MCNC: 55 MG/DL (ref 6–20)
BUN BLDV-MCNC: 72 MG/DL (ref 6–20)
BUN BLDV-MCNC: 75 MG/DL (ref 6–20)
C-REACTIVE PROTEIN, HIGH SENSITIVITY: 49.9 MG/L (ref 0–5)
C-REACTIVE PROTEIN, HIGH SENSITIVITY: 82.1 MG/L (ref 0–5)
C-REACTIVE PROTEIN: 43.7 MG/L (ref 0–5)
CALCIUM IONIZED: 0.7 MMOL/L (ref 1.12–1.32)
CALCIUM IONIZED: 0.97 MMOL/L (ref 1.12–1.32)
CALCIUM IONIZED: 1.04 MMOL/L (ref 1.12–1.32)
CALCIUM IONIZED: 1.05 MMOL/L (ref 1.12–1.32)
CALCIUM IONIZED: 1.13 MMOL/L (ref 1.12–1.32)
CALCIUM IONIZED: 1.16 MMOL/L (ref 1.12–1.32)
CALCIUM IONIZED: 1.16 MMOL/L (ref 1.12–1.32)
CALCIUM IONIZED: 1.18 MMOL/L (ref 1.12–1.32)
CALCIUM IONIZED: 1.19 MMOL/L (ref 1.12–1.32)
CALCIUM IONIZED: 1.2 MMOL/L (ref 1.12–1.32)
CALCIUM IONIZED: 1.21 MMOL/L (ref 1.12–1.32)
CALCIUM IONIZED: 1.22 MMOL/L (ref 1.12–1.32)
CALCIUM IONIZED: 1.22 MMOL/L (ref 1.12–1.32)
CALCIUM IONIZED: 1.23 MMOL/L (ref 1.12–1.32)
CALCIUM IONIZED: 1.24 MMOL/L (ref 1.12–1.32)
CALCIUM IONIZED: 1.27 MMOL/L (ref 1.12–1.32)
CALCIUM IONIZED: 1.3 MMOL/L (ref 1.12–1.32)
CALCIUM IONIZED: 1.32 MMOL/L (ref 1.12–1.32)
CALCIUM IONIZED: 1.35 MMOL/L (ref 1.12–1.32)
CALCIUM IONIZED: 1.35 MMOL/L (ref 1.12–1.32)
CALCIUM IONIZED: 1.38 MMOL/L (ref 1.12–1.32)
CALCIUM IONIZED: 1.38 MMOL/L (ref 1.12–1.32)
CALCIUM SERPL-MCNC: 7.1 MG/DL (ref 8.5–9.9)
CALCIUM SERPL-MCNC: 7.6 MG/DL (ref 8.5–9.9)
CALCIUM SERPL-MCNC: 8.3 MG/DL (ref 8.5–9.9)
CALCIUM SERPL-MCNC: 8.3 MG/DL (ref 8.5–9.9)
CALCIUM SERPL-MCNC: 8.4 MG/DL (ref 8.5–9.9)
CALCIUM SERPL-MCNC: 8.5 MG/DL (ref 8.5–9.9)
CALCIUM SERPL-MCNC: 8.6 MG/DL (ref 8.5–9.9)
CALCIUM SERPL-MCNC: 8.7 MG/DL (ref 8.5–9.9)
CALCIUM SERPL-MCNC: 8.7 MG/DL (ref 8.5–9.9)
CALCIUM SERPL-MCNC: 8.8 MG/DL (ref 8.5–9.9)
CALCIUM SERPL-MCNC: 9 MG/DL (ref 8.5–9.9)
CALCIUM SERPL-MCNC: 9.1 MG/DL (ref 8.5–9.9)
CALCIUM SERPL-MCNC: 9.1 MG/DL (ref 8.5–9.9)
CALCIUM SERPL-MCNC: 9.2 MG/DL (ref 8.5–9.9)
CALCIUM SERPL-MCNC: 9.2 MG/DL (ref 8.5–9.9)
CHLORIDE BLD-SCNC: 100 MEQ/L (ref 95–107)
CHLORIDE BLD-SCNC: 100 MEQ/L (ref 95–107)
CHLORIDE BLD-SCNC: 87 MEQ/L (ref 95–107)
CHLORIDE BLD-SCNC: 91 MEQ/L (ref 95–107)
CHLORIDE BLD-SCNC: 94 MEQ/L (ref 95–107)
CHLORIDE BLD-SCNC: 95 MEQ/L (ref 95–107)
CHLORIDE BLD-SCNC: 96 MEQ/L (ref 95–107)
CHLORIDE BLD-SCNC: 96 MEQ/L (ref 95–107)
CHLORIDE BLD-SCNC: 97 MEQ/L (ref 95–107)
CHLORIDE BLD-SCNC: 98 MEQ/L (ref 95–107)
CHLORIDE BLD-SCNC: 99 MEQ/L (ref 95–107)
CHLORIDE BLD-SCNC: 99 MEQ/L (ref 95–107)
CO2: 22 MEQ/L (ref 20–31)
CO2: 25 MEQ/L (ref 20–31)
CO2: 26 MEQ/L (ref 20–31)
CO2: 27 MEQ/L (ref 20–31)
CO2: 28 MEQ/L (ref 20–31)
CO2: 29 MEQ/L (ref 20–31)
CO2: 30 MEQ/L (ref 20–31)
CO2: 33 MEQ/L (ref 20–31)
CO2: 37 MEQ/L (ref 20–31)
CO2: 38 MEQ/L (ref 20–31)
CREAT SERPL-MCNC: 0.93 MG/DL (ref 0.7–1.2)
CREAT SERPL-MCNC: 0.97 MG/DL (ref 0.7–1.2)
CREAT SERPL-MCNC: 0.99 MG/DL (ref 0.7–1.2)
CREAT SERPL-MCNC: 1.05 MG/DL (ref 0.7–1.2)
CREAT SERPL-MCNC: 1.08 MG/DL (ref 0.7–1.2)
CREAT SERPL-MCNC: 1.08 MG/DL (ref 0.7–1.2)
CREAT SERPL-MCNC: 1.09 MG/DL (ref 0.7–1.2)
CREAT SERPL-MCNC: 1.11 MG/DL (ref 0.7–1.2)
CREAT SERPL-MCNC: 1.13 MG/DL (ref 0.7–1.2)
CREAT SERPL-MCNC: 1.14 MG/DL (ref 0.7–1.2)
CREAT SERPL-MCNC: 1.15 MG/DL (ref 0.7–1.2)
CREAT SERPL-MCNC: 1.18 MG/DL (ref 0.7–1.2)
CREAT SERPL-MCNC: 1.19 MG/DL (ref 0.7–1.2)
CREAT SERPL-MCNC: 1.25 MG/DL (ref 0.7–1.2)
CREAT SERPL-MCNC: 1.29 MG/DL (ref 0.7–1.2)
CREAT SERPL-MCNC: 1.36 MG/DL (ref 0.7–1.2)
CREAT SERPL-MCNC: 1.45 MG/DL (ref 0.7–1.2)
CREAT SERPL-MCNC: 2.56 MG/DL (ref 0.7–1.2)
CREAT SERPL-MCNC: 3.47 MG/DL (ref 0.7–1.2)
CULTURE, BLOOD 2: NORMAL
CULTURE, RESPIRATORY: ABNORMAL
D DIMER: 0.33 MG/L FEU (ref 0–0.5)
D DIMER: 0.35 MG/L FEU (ref 0–0.5)
D DIMER: 2.75 MG/L FEU (ref 0–0.5)
D DIMER: <0.27 MG/L FEU (ref 0–0.5)
EKG ATRIAL RATE: 88 BPM
EKG ATRIAL RATE: 92 BPM
EKG P AXIS: 36 DEGREES
EKG P AXIS: 43 DEGREES
EKG P-R INTERVAL: 148 MS
EKG P-R INTERVAL: 154 MS
EKG Q-T INTERVAL: 332 MS
EKG Q-T INTERVAL: 366 MS
EKG QRS DURATION: 100 MS
EKG QRS DURATION: 88 MS
EKG QTC CALCULATION (BAZETT): 401 MS
EKG QTC CALCULATION (BAZETT): 452 MS
EKG R AXIS: -15 DEGREES
EKG R AXIS: -18 DEGREES
EKG T AXIS: 34 DEGREES
EKG T AXIS: 40 DEGREES
EKG VENTRICULAR RATE: 88 BPM
EKG VENTRICULAR RATE: 92 BPM
EOSINOPHILS ABSOLUTE: 0 K/UL (ref 0–0.7)
EOSINOPHILS ABSOLUTE: 0.1 K/UL (ref 0–0.7)
EOSINOPHILS ABSOLUTE: 0.1 K/UL (ref 0–0.7)
EOSINOPHILS ABSOLUTE: 0.2 K/UL (ref 0–0.7)
EOSINOPHILS ABSOLUTE: 0.3 K/UL (ref 0–0.7)
EOSINOPHILS ABSOLUTE: 0.4 K/UL (ref 0–0.7)
EOSINOPHILS RELATIVE PERCENT: 0 %
EOSINOPHILS RELATIVE PERCENT: 0.1 %
EOSINOPHILS RELATIVE PERCENT: 0.2 %
EOSINOPHILS RELATIVE PERCENT: 0.2 %
EOSINOPHILS RELATIVE PERCENT: 0.7 %
EOSINOPHILS RELATIVE PERCENT: 0.7 %
EOSINOPHILS RELATIVE PERCENT: 1.4 %
EOSINOPHILS RELATIVE PERCENT: 1.8 %
EOSINOPHILS RELATIVE PERCENT: 2 %
EOSINOPHILS RELATIVE PERCENT: 2.1 %
EOSINOPHILS RELATIVE PERCENT: 2.5 %
EOSINOPHILS RELATIVE PERCENT: 2.7 %
FERRITIN: 1398 NG/ML (ref 30–400)
FERRITIN: 1406 NG/ML (ref 30–400)
FERRITIN: 1408 NG/ML (ref 30–400)
FERRITIN: 1437 NG/ML (ref 30–400)
FERRITIN: 1500 NG/ML (ref 30–400)
FERRITIN: 1568 NG/ML (ref 30–400)
FERRITIN: 1932 NG/ML (ref 30–400)
FERRITIN: 2000 NG/ML (ref 30–400)
FERRITIN: 2000 NG/ML (ref 30–400)
FERRITIN: 2022 NG/ML (ref 30–400)
FERRITIN: 2542 NG/ML (ref 30–400)
FERRITIN: ABNORMAL NG/ML (ref 30–400)
FIBRINOGEN: 529 MG/DL (ref 235–507)
GFR AFRICAN AMERICAN: 12
GFR AFRICAN AMERICAN: 15
GFR AFRICAN AMERICAN: 15
GFR AFRICAN AMERICAN: 20
GFR AFRICAN AMERICAN: 21
GFR AFRICAN AMERICAN: 22.6
GFR AFRICAN AMERICAN: 32
GFR AFRICAN AMERICAN: 32.1
GFR AFRICAN AMERICAN: 35
GFR AFRICAN AMERICAN: 38
GFR AFRICAN AMERICAN: 52
GFR AFRICAN AMERICAN: 52
GFR AFRICAN AMERICAN: 60
GFR AFRICAN AMERICAN: >60
GFR NON-AFRICAN AMERICAN: 10
GFR NON-AFRICAN AMERICAN: 12
GFR NON-AFRICAN AMERICAN: 12
GFR NON-AFRICAN AMERICAN: 16
GFR NON-AFRICAN AMERICAN: 17
GFR NON-AFRICAN AMERICAN: 18.7
GFR NON-AFRICAN AMERICAN: 26
GFR NON-AFRICAN AMERICAN: 26.6
GFR NON-AFRICAN AMERICAN: 29
GFR NON-AFRICAN AMERICAN: 32
GFR NON-AFRICAN AMERICAN: 43
GFR NON-AFRICAN AMERICAN: 43
GFR NON-AFRICAN AMERICAN: 49
GFR NON-AFRICAN AMERICAN: 51.2
GFR NON-AFRICAN AMERICAN: 53
GFR NON-AFRICAN AMERICAN: 55.1
GFR NON-AFRICAN AMERICAN: 58.6
GFR NON-AFRICAN AMERICAN: >60
GLOBULIN: 1.8 G/DL (ref 2.3–3.5)
GLOBULIN: 2 G/DL (ref 2.3–3.5)
GLOBULIN: 2.2 G/DL (ref 2.3–3.5)
GLOBULIN: 2.4 G/DL (ref 2.3–3.5)
GLOBULIN: 2.5 G/DL (ref 2.3–3.5)
GLOBULIN: 2.6 G/DL (ref 2.3–3.5)
GLOBULIN: 2.8 G/DL (ref 2.3–3.5)
GLOBULIN: 2.9 G/DL (ref 2.3–3.5)
GLOBULIN: 3 G/DL (ref 2.3–3.5)
GLOBULIN: 3.1 G/DL (ref 2.3–3.5)
GLOBULIN: 3.2 G/DL (ref 2.3–3.5)
GLOBULIN: 3.4 G/DL (ref 2.3–3.5)
GLOBULIN: 3.6 G/DL (ref 2.3–3.5)
GLUCOSE BLD-MCNC: 107 MG/DL (ref 60–115)
GLUCOSE BLD-MCNC: 109 MG/DL (ref 60–115)
GLUCOSE BLD-MCNC: 112 MG/DL (ref 70–99)
GLUCOSE BLD-MCNC: 123 MG/DL (ref 60–115)
GLUCOSE BLD-MCNC: 127 MG/DL (ref 60–115)
GLUCOSE BLD-MCNC: 132 MG/DL (ref 70–99)
GLUCOSE BLD-MCNC: 134 MG/DL (ref 60–115)
GLUCOSE BLD-MCNC: 135 MG/DL (ref 60–115)
GLUCOSE BLD-MCNC: 138 MG/DL (ref 60–115)
GLUCOSE BLD-MCNC: 140 MG/DL (ref 60–115)
GLUCOSE BLD-MCNC: 142 MG/DL (ref 60–115)
GLUCOSE BLD-MCNC: 144 MG/DL (ref 70–99)
GLUCOSE BLD-MCNC: 145 MG/DL (ref 60–115)
GLUCOSE BLD-MCNC: 145 MG/DL (ref 60–115)
GLUCOSE BLD-MCNC: 146 MG/DL (ref 60–115)
GLUCOSE BLD-MCNC: 148 MG/DL (ref 60–115)
GLUCOSE BLD-MCNC: 149 MG/DL (ref 60–115)
GLUCOSE BLD-MCNC: 152 MG/DL (ref 60–115)
GLUCOSE BLD-MCNC: 153 MG/DL (ref 70–99)
GLUCOSE BLD-MCNC: 154 MG/DL (ref 60–115)
GLUCOSE BLD-MCNC: 154 MG/DL (ref 60–115)
GLUCOSE BLD-MCNC: 155 MG/DL (ref 60–115)
GLUCOSE BLD-MCNC: 156 MG/DL (ref 70–99)
GLUCOSE BLD-MCNC: 158 MG/DL (ref 60–115)
GLUCOSE BLD-MCNC: 160 MG/DL (ref 60–115)
GLUCOSE BLD-MCNC: 161 MG/DL (ref 60–115)
GLUCOSE BLD-MCNC: 161 MG/DL (ref 70–99)
GLUCOSE BLD-MCNC: 163 MG/DL (ref 60–115)
GLUCOSE BLD-MCNC: 164 MG/DL (ref 60–115)
GLUCOSE BLD-MCNC: 164 MG/DL (ref 60–115)
GLUCOSE BLD-MCNC: 165 MG/DL (ref 60–115)
GLUCOSE BLD-MCNC: 167 MG/DL (ref 60–115)
GLUCOSE BLD-MCNC: 168 MG/DL (ref 60–115)
GLUCOSE BLD-MCNC: 169 MG/DL (ref 60–115)
GLUCOSE BLD-MCNC: 169 MG/DL (ref 70–99)
GLUCOSE BLD-MCNC: 172 MG/DL (ref 60–115)
GLUCOSE BLD-MCNC: 172 MG/DL (ref 60–115)
GLUCOSE BLD-MCNC: 173 MG/DL (ref 60–115)
GLUCOSE BLD-MCNC: 174 MG/DL (ref 60–115)
GLUCOSE BLD-MCNC: 174 MG/DL (ref 60–115)
GLUCOSE BLD-MCNC: 176 MG/DL (ref 60–115)
GLUCOSE BLD-MCNC: 181 MG/DL (ref 70–99)
GLUCOSE BLD-MCNC: 181 MG/DL (ref 70–99)
GLUCOSE BLD-MCNC: 184 MG/DL (ref 60–115)
GLUCOSE BLD-MCNC: 188 MG/DL (ref 60–115)
GLUCOSE BLD-MCNC: 192 MG/DL (ref 60–115)
GLUCOSE BLD-MCNC: 194 MG/DL (ref 60–115)
GLUCOSE BLD-MCNC: 195 MG/DL (ref 60–115)
GLUCOSE BLD-MCNC: 195 MG/DL (ref 60–115)
GLUCOSE BLD-MCNC: 196 MG/DL (ref 70–99)
GLUCOSE BLD-MCNC: 197 MG/DL (ref 60–115)
GLUCOSE BLD-MCNC: 197 MG/DL (ref 70–99)
GLUCOSE BLD-MCNC: 198 MG/DL (ref 60–115)
GLUCOSE BLD-MCNC: 199 MG/DL (ref 60–115)
GLUCOSE BLD-MCNC: 200 MG/DL (ref 60–115)
GLUCOSE BLD-MCNC: 201 MG/DL (ref 60–115)
GLUCOSE BLD-MCNC: 202 MG/DL (ref 60–115)
GLUCOSE BLD-MCNC: 206 MG/DL (ref 60–115)
GLUCOSE BLD-MCNC: 209 MG/DL (ref 60–115)
GLUCOSE BLD-MCNC: 212 MG/DL (ref 60–115)
GLUCOSE BLD-MCNC: 212 MG/DL (ref 60–115)
GLUCOSE BLD-MCNC: 213 MG/DL (ref 60–115)
GLUCOSE BLD-MCNC: 216 MG/DL (ref 60–115)
GLUCOSE BLD-MCNC: 218 MG/DL (ref 60–115)
GLUCOSE BLD-MCNC: 223 MG/DL (ref 60–115)
GLUCOSE BLD-MCNC: 229 MG/DL (ref 60–115)
GLUCOSE BLD-MCNC: 229 MG/DL (ref 70–99)
GLUCOSE BLD-MCNC: 230 MG/DL (ref 60–115)
GLUCOSE BLD-MCNC: 233 MG/DL (ref 70–99)
GLUCOSE BLD-MCNC: 234 MG/DL (ref 70–99)
GLUCOSE BLD-MCNC: 239 MG/DL (ref 60–115)
GLUCOSE BLD-MCNC: 240 MG/DL (ref 60–115)
GLUCOSE BLD-MCNC: 241 MG/DL (ref 60–115)
GLUCOSE BLD-MCNC: 241 MG/DL (ref 60–115)
GLUCOSE BLD-MCNC: 243 MG/DL (ref 60–115)
GLUCOSE BLD-MCNC: 244 MG/DL (ref 60–115)
GLUCOSE BLD-MCNC: 246 MG/DL (ref 60–115)
GLUCOSE BLD-MCNC: 249 MG/DL (ref 60–115)
GLUCOSE BLD-MCNC: 249 MG/DL (ref 60–115)
GLUCOSE BLD-MCNC: 252 MG/DL (ref 60–115)
GLUCOSE BLD-MCNC: 253 MG/DL (ref 60–115)
GLUCOSE BLD-MCNC: 255 MG/DL (ref 60–115)
GLUCOSE BLD-MCNC: 258 MG/DL (ref 60–115)
GLUCOSE BLD-MCNC: 264 MG/DL (ref 60–115)
GLUCOSE BLD-MCNC: 265 MG/DL (ref 60–115)
GLUCOSE BLD-MCNC: 266 MG/DL (ref 70–99)
GLUCOSE BLD-MCNC: 267 MG/DL (ref 60–115)
GLUCOSE BLD-MCNC: 269 MG/DL (ref 60–115)
GLUCOSE BLD-MCNC: 272 MG/DL (ref 60–115)
GLUCOSE BLD-MCNC: 273 MG/DL (ref 60–115)
GLUCOSE BLD-MCNC: 273 MG/DL (ref 70–99)
GLUCOSE BLD-MCNC: 275 MG/DL (ref 60–115)
GLUCOSE BLD-MCNC: 276 MG/DL (ref 60–115)
GLUCOSE BLD-MCNC: 278 MG/DL (ref 60–115)
GLUCOSE BLD-MCNC: 281 MG/DL (ref 60–115)
GLUCOSE BLD-MCNC: 283 MG/DL (ref 60–115)
GLUCOSE BLD-MCNC: 285 MG/DL (ref 60–115)
GLUCOSE BLD-MCNC: 299 MG/DL (ref 60–115)
GLUCOSE BLD-MCNC: 300 MG/DL (ref 60–115)
GLUCOSE BLD-MCNC: 301 MG/DL (ref 60–115)
GLUCOSE BLD-MCNC: 307 MG/DL (ref 60–115)
GLUCOSE BLD-MCNC: 317 MG/DL (ref 70–99)
GLUCOSE BLD-MCNC: 318 MG/DL (ref 70–99)
GLUCOSE BLD-MCNC: 324 MG/DL (ref 70–99)
GLUCOSE BLD-MCNC: 327 MG/DL (ref 60–115)
GLUCOSE BLD-MCNC: 330 MG/DL (ref 60–115)
GLUCOSE BLD-MCNC: 330 MG/DL (ref 60–115)
GLUCOSE BLD-MCNC: 336 MG/DL (ref 60–115)
GLUCOSE BLD-MCNC: 336 MG/DL (ref 60–115)
GLUCOSE BLD-MCNC: 345 MG/DL (ref 60–115)
GLUCOSE BLD-MCNC: 373 MG/DL (ref 60–115)
GRAM STAIN RESULT: ABNORMAL
GRAM STAIN RESULT: ABNORMAL
HBA1C MFR BLD: 7.9 % (ref 4.8–5.9)
HCO3 ARTERIAL: 24.7 MMOL/L (ref 21–29)
HCO3 ARTERIAL: 26.2 MMOL/L (ref 21–29)
HCO3 ARTERIAL: 27.3 MMOL/L (ref 21–29)
HCO3 ARTERIAL: 27.5 MMOL/L (ref 21–29)
HCO3 ARTERIAL: 28.3 MMOL/L (ref 21–29)
HCO3 ARTERIAL: 28.5 MMOL/L (ref 21–29)
HCO3 ARTERIAL: 28.8 MMOL/L (ref 21–29)
HCO3 ARTERIAL: 29 MMOL/L (ref 21–29)
HCO3 ARTERIAL: 29.2 MMOL/L (ref 21–29)
HCO3 ARTERIAL: 29.2 MMOL/L (ref 21–29)
HCO3 ARTERIAL: 29.3 MMOL/L (ref 21–29)
HCO3 ARTERIAL: 29.3 MMOL/L (ref 21–29)
HCO3 ARTERIAL: 29.8 MMOL/L (ref 21–29)
HCO3 ARTERIAL: 30.8 MMOL/L (ref 21–29)
HCO3 ARTERIAL: 32.6 MMOL/L (ref 21–29)
HCO3 ARTERIAL: 33.1 MMOL/L (ref 21–29)
HCO3 ARTERIAL: 33.7 MMOL/L (ref 21–29)
HCO3 ARTERIAL: 34.3 MMOL/L (ref 21–29)
HCO3 ARTERIAL: 34.3 MMOL/L (ref 21–29)
HCO3 ARTERIAL: 35.4 MMOL/L (ref 21–29)
HCO3 ARTERIAL: 35.9 MMOL/L (ref 21–29)
HCO3 ARTERIAL: 37.3 MMOL/L (ref 21–29)
HCO3 ARTERIAL: 37.7 MMOL/L (ref 21–29)
HCO3 ARTERIAL: 39.1 MMOL/L (ref 21–29)
HCO3 ARTERIAL: 39.8 MMOL/L (ref 21–29)
HCO3 ARTERIAL: 40.8 MMOL/L (ref 21–29)
HCO3 ARTERIAL: 41.2 MMOL/L (ref 21–29)
HCO3 ARTERIAL: 41.2 MMOL/L (ref 21–29)
HCO3 ARTERIAL: 42.7 MMOL/L (ref 21–29)
HCO3 ARTERIAL: 43.3 MMOL/L (ref 21–29)
HCO3 ARTERIAL: 48.9 MMOL/L (ref 21–29)
HCT VFR BLD CALC: 27.9 % (ref 42–52)
HCT VFR BLD CALC: 33.9 % (ref 42–52)
HCT VFR BLD CALC: 36.5 % (ref 42–52)
HCT VFR BLD CALC: 45.6 % (ref 42–52)
HCT VFR BLD CALC: 45.7 % (ref 42–52)
HCT VFR BLD CALC: 46.1 % (ref 42–52)
HCT VFR BLD CALC: 46.7 % (ref 42–52)
HCT VFR BLD CALC: 46.7 % (ref 42–52)
HCT VFR BLD CALC: 47.7 % (ref 42–52)
HCT VFR BLD CALC: 47.7 % (ref 42–52)
HCT VFR BLD CALC: 48.1 % (ref 42–52)
HCT VFR BLD CALC: 48.3 % (ref 42–52)
HCT VFR BLD CALC: 48.6 % (ref 42–52)
HCT VFR BLD CALC: 48.8 % (ref 42–52)
HCT VFR BLD CALC: 49.4 % (ref 42–52)
HCT VFR BLD CALC: 50.4 % (ref 42–52)
HCT VFR BLD CALC: 50.8 % (ref 42–52)
HCT VFR BLD CALC: 52.7 % (ref 42–52)
HEMATOLOGY PATH CONSULT: NORMAL
HEMATOLOGY PATH CONSULT: YES
HEMOGLOBIN: 10.8 GM/DL (ref 13.5–17.5)
HEMOGLOBIN: 10.9 G/DL (ref 14–18)
HEMOGLOBIN: 11 GM/DL (ref 13.5–17.5)
HEMOGLOBIN: 11 GM/DL (ref 13.5–17.5)
HEMOGLOBIN: 11.4 GM/DL (ref 13.5–17.5)
HEMOGLOBIN: 11.8 G/DL (ref 14–18)
HEMOGLOBIN: 13.3 GM/DL (ref 13.5–17.5)
HEMOGLOBIN: 14.5 G/DL (ref 14–18)
HEMOGLOBIN: 14.6 G/DL (ref 14–18)
HEMOGLOBIN: 15.2 GM/DL (ref 13.5–17.5)
HEMOGLOBIN: 15.4 G/DL (ref 14–18)
HEMOGLOBIN: 15.6 G/DL (ref 14–18)
HEMOGLOBIN: 15.8 G/DL (ref 14–18)
HEMOGLOBIN: 15.8 G/DL (ref 14–18)
HEMOGLOBIN: 15.9 G/DL (ref 14–18)
HEMOGLOBIN: 16 G/DL (ref 14–18)
HEMOGLOBIN: 16.1 G/DL (ref 14–18)
HEMOGLOBIN: 16.3 G/DL (ref 14–18)
HEMOGLOBIN: 16.3 G/DL (ref 14–18)
HEMOGLOBIN: 16.6 G/DL (ref 14–18)
HEMOGLOBIN: 16.6 GM/DL (ref 13.5–17.5)
HEMOGLOBIN: 16.7 GM/DL (ref 13.5–17.5)
HEMOGLOBIN: 16.7 GM/DL (ref 13.5–17.5)
HEMOGLOBIN: 17 GM/DL (ref 13.5–17.5)
HEMOGLOBIN: 17.1 G/DL (ref 14–18)
HEMOGLOBIN: 17.2 GM/DL (ref 13.5–17.5)
HEMOGLOBIN: 17.5 GM/DL (ref 13.5–17.5)
HEMOGLOBIN: 17.7 GM/DL (ref 13.5–17.5)
HEMOGLOBIN: 17.8 GM/DL (ref 13.5–17.5)
HEMOGLOBIN: 18.1 GM/DL (ref 13.5–17.5)
HEMOGLOBIN: 18.2 GM/DL (ref 13.5–17.5)
HEMOGLOBIN: 18.2 GM/DL (ref 13.5–17.5)
HEMOGLOBIN: 18.4 GM/DL (ref 13.5–17.5)
HEMOGLOBIN: 18.5 GM/DL (ref 13.5–17.5)
HEMOGLOBIN: 18.5 GM/DL (ref 13.5–17.5)
HEMOGLOBIN: 18.6 GM/DL (ref 13.5–17.5)
HEMOGLOBIN: 18.8 GM/DL (ref 13.5–17.5)
HEMOGLOBIN: 18.8 GM/DL (ref 13.5–17.5)
HEMOGLOBIN: 18.9 GM/DL (ref 13.5–17.5)
HEMOGLOBIN: 19.3 GM/DL (ref 13.5–17.5)
HEMOGLOBIN: 19.8 GM/DL (ref 13.5–17.5)
HEMOGLOBIN: 20 GM/DL (ref 13.5–17.5)
HEMOGLOBIN: 5.2 GM/DL (ref 13.5–17.5)
HEMOGLOBIN: 6.7 GM/DL (ref 13.5–17.5)
HEMOGLOBIN: 8.6 G/DL (ref 14–18)
HEMOGLOBIN: 9.5 GM/DL (ref 13.5–17.5)
HEPATITIS C ANTIBODY INTERPRETATION: NORMAL
INR BLD: 1
INR BLD: 1.2
LACTATE: 0.93 MMOL/L (ref 0.4–2)
LACTATE: 1.54 MMOL/L (ref 0.4–2)
LACTATE: 1.72 MMOL/L (ref 0.4–2)
LACTATE: 1.78 MMOL/L (ref 0.4–2)
LACTATE: 1.82 MMOL/L (ref 0.4–2)
LACTATE: 1.88 MMOL/L (ref 0.4–2)
LACTATE: 1.93 MMOL/L (ref 0.4–2)
LACTATE: 1.97 MMOL/L (ref 0.4–2)
LACTATE: 2.06 MMOL/L (ref 0.4–2)
LACTATE: 2.07 MMOL/L (ref 0.4–2)
LACTATE: 2.08 MMOL/L (ref 0.4–2)
LACTATE: 2.15 MMOL/L (ref 0.4–2)
LACTATE: 2.15 MMOL/L (ref 0.4–2)
LACTATE: 2.21 MMOL/L (ref 0.4–2)
LACTATE: 2.25 MMOL/L (ref 0.4–2)
LACTATE: 2.31 MMOL/L (ref 0.4–2)
LACTATE: 2.39 MMOL/L (ref 0.4–2)
LACTATE: 2.42 MMOL/L (ref 0.4–2)
LACTATE: 2.42 MMOL/L (ref 0.4–2)
LACTATE: 2.43 MMOL/L (ref 0.4–2)
LACTATE: 2.47 MMOL/L (ref 0.4–2)
LACTATE: 2.54 MMOL/L (ref 0.4–2)
LACTATE: 2.74 MMOL/L (ref 0.4–2)
LACTATE: 2.79 MMOL/L (ref 0.4–2)
LACTATE: 2.79 MMOL/L (ref 0.4–2)
LACTATE: 2.98 MMOL/L (ref 0.4–2)
LACTATE: 5.62 MMOL/L (ref 0.4–2)
LACTATE: 5.7 MMOL/L (ref 0.4–2)
LACTATE: 6.12 MMOL/L (ref 0.4–2)
LACTATE: 7.96 MMOL/L (ref 0.4–2)
LACTATE: 9.29 MMOL/L (ref 0.4–2)
LACTIC ACID: 1.3 MMOL/L (ref 0.5–2.2)
LACTIC ACID: 9 MMOL/L (ref 0.5–2.2)
LYMPHOCYTES ABSOLUTE: 0.3 K/UL (ref 1–4.8)
LYMPHOCYTES ABSOLUTE: 0.5 K/UL (ref 1–4.8)
LYMPHOCYTES ABSOLUTE: 0.6 K/UL (ref 1–4.8)
LYMPHOCYTES ABSOLUTE: 0.7 K/UL (ref 1–4.8)
LYMPHOCYTES ABSOLUTE: 0.7 K/UL (ref 1–4.8)
LYMPHOCYTES ABSOLUTE: 1 K/UL (ref 1–4.8)
LYMPHOCYTES ABSOLUTE: 1.1 K/UL (ref 1–4.8)
LYMPHOCYTES ABSOLUTE: 1.1 K/UL (ref 1–4.8)
LYMPHOCYTES ABSOLUTE: 1.2 K/UL (ref 1–4.8)
LYMPHOCYTES ABSOLUTE: 1.3 K/UL (ref 1–4.8)
LYMPHOCYTES ABSOLUTE: 1.3 K/UL (ref 1–4.8)
LYMPHOCYTES ABSOLUTE: 2.4 K/UL (ref 1–4.8)
LYMPHOCYTES RELATIVE PERCENT: 10.9 %
LYMPHOCYTES RELATIVE PERCENT: 13.7 %
LYMPHOCYTES RELATIVE PERCENT: 14.1 %
LYMPHOCYTES RELATIVE PERCENT: 14.8 %
LYMPHOCYTES RELATIVE PERCENT: 15.3 %
LYMPHOCYTES RELATIVE PERCENT: 16.4 %
LYMPHOCYTES RELATIVE PERCENT: 2.3 %
LYMPHOCYTES RELATIVE PERCENT: 2.3 %
LYMPHOCYTES RELATIVE PERCENT: 3 %
LYMPHOCYTES RELATIVE PERCENT: 3 %
LYMPHOCYTES RELATIVE PERCENT: 3.3 %
LYMPHOCYTES RELATIVE PERCENT: 5 %
LYMPHOCYTES RELATIVE PERCENT: 5 %
LYMPHOCYTES RELATIVE PERCENT: 5.7 %
LYMPHOCYTES RELATIVE PERCENT: 6.4 %
LYMPHOCYTES RELATIVE PERCENT: 9.5 %
MCH RBC QN AUTO: 26.5 PG (ref 27–31.3)
MCH RBC QN AUTO: 26.6 PG (ref 27–31.3)
MCH RBC QN AUTO: 26.7 PG (ref 27–31.3)
MCH RBC QN AUTO: 26.8 PG (ref 27–31.3)
MCH RBC QN AUTO: 26.9 PG (ref 27–31.3)
MCH RBC QN AUTO: 27.1 PG (ref 27–31.3)
MCH RBC QN AUTO: 27.2 PG (ref 27–31.3)
MCH RBC QN AUTO: 27.2 PG (ref 27–31.3)
MCH RBC QN AUTO: 27.3 PG (ref 27–31.3)
MCH RBC QN AUTO: 27.4 PG (ref 27–31.3)
MCHC RBC AUTO-ENTMCNC: 31 % (ref 33–37)
MCHC RBC AUTO-ENTMCNC: 31.8 % (ref 33–37)
MCHC RBC AUTO-ENTMCNC: 32.1 % (ref 33–37)
MCHC RBC AUTO-ENTMCNC: 32.1 % (ref 33–37)
MCHC RBC AUTO-ENTMCNC: 32.3 % (ref 33–37)
MCHC RBC AUTO-ENTMCNC: 32.3 % (ref 33–37)
MCHC RBC AUTO-ENTMCNC: 32.4 % (ref 33–37)
MCHC RBC AUTO-ENTMCNC: 32.4 % (ref 33–37)
MCHC RBC AUTO-ENTMCNC: 32.7 % (ref 33–37)
MCHC RBC AUTO-ENTMCNC: 32.9 % (ref 33–37)
MCHC RBC AUTO-ENTMCNC: 33 % (ref 33–37)
MCHC RBC AUTO-ENTMCNC: 33.1 % (ref 33–37)
MCHC RBC AUTO-ENTMCNC: 33.1 % (ref 33–37)
MCHC RBC AUTO-ENTMCNC: 33.4 % (ref 33–37)
MCHC RBC AUTO-ENTMCNC: 33.4 % (ref 33–37)
MCHC RBC AUTO-ENTMCNC: 33.5 % (ref 33–37)
MCV RBC AUTO: 81.3 FL (ref 80–100)
MCV RBC AUTO: 81.4 FL (ref 80–100)
MCV RBC AUTO: 81.5 FL (ref 80–100)
MCV RBC AUTO: 81.6 FL (ref 80–100)
MCV RBC AUTO: 81.8 FL (ref 80–100)
MCV RBC AUTO: 82 FL (ref 80–100)
MCV RBC AUTO: 82 FL (ref 80–100)
MCV RBC AUTO: 82.1 FL (ref 80–100)
MCV RBC AUTO: 82.5 FL (ref 80–100)
MCV RBC AUTO: 82.7 FL (ref 80–100)
MCV RBC AUTO: 82.8 FL (ref 80–100)
MCV RBC AUTO: 83 FL (ref 80–100)
MCV RBC AUTO: 83.1 FL (ref 80–100)
MCV RBC AUTO: 83.3 FL (ref 80–100)
MCV RBC AUTO: 83.3 FL (ref 80–100)
MCV RBC AUTO: 83.5 FL (ref 80–100)
MCV RBC AUTO: 85.9 FL (ref 80–100)
MCV RBC AUTO: 85.9 FL (ref 80–100)
METAMYELOCYTES RELATIVE PERCENT: 2 %
METAMYELOCYTES RELATIVE PERCENT: 2 %
METAMYELOCYTES RELATIVE PERCENT: 3 %
MONOCYTES ABSOLUTE: 0.2 K/UL (ref 0.2–0.8)
MONOCYTES ABSOLUTE: 0.3 K/UL (ref 0.2–0.8)
MONOCYTES ABSOLUTE: 0.5 K/UL (ref 0.2–0.8)
MONOCYTES ABSOLUTE: 0.6 K/UL (ref 0.2–0.8)
MONOCYTES ABSOLUTE: 0.7 K/UL (ref 0.2–0.8)
MONOCYTES ABSOLUTE: 0.8 K/UL (ref 0.2–0.8)
MONOCYTES ABSOLUTE: 1 K/UL (ref 0.2–0.8)
MONOCYTES ABSOLUTE: 1.1 K/UL (ref 0.2–0.8)
MONOCYTES ABSOLUTE: 1.1 K/UL (ref 0.2–0.8)
MONOCYTES ABSOLUTE: 1.2 K/UL (ref 0.2–0.8)
MONOCYTES ABSOLUTE: 1.2 K/UL (ref 0.2–0.8)
MONOCYTES ABSOLUTE: 1.3 K/UL (ref 0.2–0.8)
MONOCYTES RELATIVE PERCENT: 1 %
MONOCYTES RELATIVE PERCENT: 10.1 %
MONOCYTES RELATIVE PERCENT: 10.4 %
MONOCYTES RELATIVE PERCENT: 10.5 %
MONOCYTES RELATIVE PERCENT: 12 %
MONOCYTES RELATIVE PERCENT: 14.4 %
MONOCYTES RELATIVE PERCENT: 15.8 %
MONOCYTES RELATIVE PERCENT: 2.2 %
MONOCYTES RELATIVE PERCENT: 2.4 %
MONOCYTES RELATIVE PERCENT: 2.5 %
MONOCYTES RELATIVE PERCENT: 2.8 %
MONOCYTES RELATIVE PERCENT: 3.6 %
MONOCYTES RELATIVE PERCENT: 4.9 %
MONOCYTES RELATIVE PERCENT: 5.3 %
MONOCYTES RELATIVE PERCENT: 6.3 %
MONOCYTES RELATIVE PERCENT: 9.1 %
MYELOCYTE PERCENT: 2 %
MYELOCYTE PERCENT: 5 %
MYELOCYTE PERCENT: 5 %
NEUTROPHILS ABSOLUTE: 12.2 K/UL (ref 1.4–6.5)
NEUTROPHILS ABSOLUTE: 12.4 K/UL (ref 1.4–6.5)
NEUTROPHILS ABSOLUTE: 12.6 K/UL (ref 1.4–6.5)
NEUTROPHILS ABSOLUTE: 17.4 K/UL (ref 1.4–6.5)
NEUTROPHILS ABSOLUTE: 19.4 K/UL (ref 1.4–6.5)
NEUTROPHILS ABSOLUTE: 21.3 K/UL (ref 1.4–6.5)
NEUTROPHILS ABSOLUTE: 21.9 K/UL (ref 1.4–6.5)
NEUTROPHILS ABSOLUTE: 3.5 K/UL (ref 1.4–6.5)
NEUTROPHILS ABSOLUTE: 37.3 K/UL (ref 1.4–6.5)
NEUTROPHILS ABSOLUTE: 4.4 K/UL (ref 1.4–6.5)
NEUTROPHILS ABSOLUTE: 4.8 K/UL (ref 1.4–6.5)
NEUTROPHILS ABSOLUTE: 5.3 K/UL (ref 1.4–6.5)
NEUTROPHILS ABSOLUTE: 6 K/UL (ref 1.4–6.5)
NEUTROPHILS ABSOLUTE: 6.1 K/UL (ref 1.4–6.5)
NEUTROPHILS ABSOLUTE: 8.2 K/UL (ref 1.4–6.5)
NEUTROPHILS ABSOLUTE: 8.8 K/UL (ref 1.4–6.5)
NEUTROPHILS RELATIVE PERCENT: 67.4 %
NEUTROPHILS RELATIVE PERCENT: 71.4 %
NEUTROPHILS RELATIVE PERCENT: 72.6 %
NEUTROPHILS RELATIVE PERCENT: 73 %
NEUTROPHILS RELATIVE PERCENT: 73.1 %
NEUTROPHILS RELATIVE PERCENT: 75.2 %
NEUTROPHILS RELATIVE PERCENT: 78.5 %
NEUTROPHILS RELATIVE PERCENT: 83.6 %
NEUTROPHILS RELATIVE PERCENT: 84 %
NEUTROPHILS RELATIVE PERCENT: 84.2 %
NEUTROPHILS RELATIVE PERCENT: 85 %
NEUTROPHILS RELATIVE PERCENT: 85.5 %
NEUTROPHILS RELATIVE PERCENT: 89.7 %
NEUTROPHILS RELATIVE PERCENT: 91.7 %
NEUTROPHILS RELATIVE PERCENT: 93.3 %
NEUTROPHILS RELATIVE PERCENT: 95.1 %
NUCLEATED RED BLOOD CELLS: 1 /100 WBC
O2 SAT, ARTERIAL: 24 % (ref 93–100)
O2 SAT, ARTERIAL: 48 % (ref 93–100)
O2 SAT, ARTERIAL: 65 % (ref 93–100)
O2 SAT, ARTERIAL: 66 % (ref 93–100)
O2 SAT, ARTERIAL: 71 % (ref 93–100)
O2 SAT, ARTERIAL: 76 % (ref 93–100)
O2 SAT, ARTERIAL: 76 % (ref 93–100)
O2 SAT, ARTERIAL: 83 % (ref 93–100)
O2 SAT, ARTERIAL: 84 % (ref 93–100)
O2 SAT, ARTERIAL: 84 % (ref 93–100)
O2 SAT, ARTERIAL: 86 % (ref 93–100)
O2 SAT, ARTERIAL: 87 % (ref 93–100)
O2 SAT, ARTERIAL: 87 % (ref 93–100)
O2 SAT, ARTERIAL: 88 % (ref 93–100)
O2 SAT, ARTERIAL: 88 % (ref 93–100)
O2 SAT, ARTERIAL: 89 % (ref 93–100)
O2 SAT, ARTERIAL: 91 % (ref 93–100)
O2 SAT, ARTERIAL: 92 % (ref 93–100)
O2 SAT, ARTERIAL: 92 % (ref 93–100)
O2 SAT, ARTERIAL: 93 % (ref 93–100)
O2 SAT, ARTERIAL: 94 % (ref 93–100)
O2 SAT, ARTERIAL: 95 % (ref 93–100)
O2 SAT, ARTERIAL: 95 % (ref 93–100)
O2 SAT, ARTERIAL: 96 % (ref 93–100)
O2 SAT, ARTERIAL: 97 % (ref 93–100)
O2 SAT, ARTERIAL: 97 % (ref 93–100)
ORGANISM: ABNORMAL
ORGANISM: ABNORMAL
PCO2 ARTERIAL: 109 MM HG (ref 35–45)
PCO2 ARTERIAL: 127 MM HG (ref 35–45)
PCO2 ARTERIAL: 134 MM HG (ref 35–45)
PCO2 ARTERIAL: 153 MM HG (ref 35–45)
PCO2 ARTERIAL: 39 MM HG (ref 35–45)
PCO2 ARTERIAL: 46 MM HG (ref 35–45)
PCO2 ARTERIAL: 48 MM HG (ref 35–45)
PCO2 ARTERIAL: 53 MM HG (ref 35–45)
PCO2 ARTERIAL: 53 MM HG (ref 35–45)
PCO2 ARTERIAL: 54 MM HG (ref 35–45)
PCO2 ARTERIAL: 54 MM HG (ref 35–45)
PCO2 ARTERIAL: 55 MM HG (ref 35–45)
PCO2 ARTERIAL: 58 MM HG (ref 35–45)
PCO2 ARTERIAL: 61 MM HG (ref 35–45)
PCO2 ARTERIAL: 64 MM HG (ref 35–45)
PCO2 ARTERIAL: 67 MM HG (ref 35–45)
PCO2 ARTERIAL: 67 MM HG (ref 35–45)
PCO2 ARTERIAL: 69 MM HG (ref 35–45)
PCO2 ARTERIAL: 85 MM HG (ref 35–45)
PCO2 ARTERIAL: 86 MM HG (ref 35–45)
PCO2 ARTERIAL: 89 MM HG (ref 35–45)
PCO2 ARTERIAL: 90 MM HG (ref 35–45)
PCO2 ARTERIAL: 91 MM HG (ref 35–45)
PCO2 ARTERIAL: 93 MM HG (ref 35–45)
PCO2 ARTERIAL: 94 MM HG (ref 35–45)
PCO2 ARTERIAL: 96 MM HG (ref 35–45)
PCO2 ARTERIAL: 96 MM HG (ref 35–45)
PDW BLD-RTO: 14.1 % (ref 11.5–14.5)
PDW BLD-RTO: 14.2 % (ref 11.5–14.5)
PDW BLD-RTO: 14.3 % (ref 11.5–14.5)
PDW BLD-RTO: 14.3 % (ref 11.5–14.5)
PDW BLD-RTO: 14.4 % (ref 11.5–14.5)
PDW BLD-RTO: 14.5 % (ref 11.5–14.5)
PDW BLD-RTO: 14.6 % (ref 11.5–14.5)
PDW BLD-RTO: 14.7 % (ref 11.5–14.5)
PDW BLD-RTO: 14.7 % (ref 11.5–14.5)
PDW BLD-RTO: 14.8 % (ref 11.5–14.5)
PDW BLD-RTO: 15 % (ref 11.5–14.5)
PDW BLD-RTO: 15.3 % (ref 11.5–14.5)
PERFORMED ON: ABNORMAL
PERFORMED ON: NORMAL
PERFORMED ON: NORMAL
PH ARTERIAL: 6.97 (ref 7.35–7.45)
PH ARTERIAL: 7.03 (ref 7.35–7.45)
PH ARTERIAL: 7.04 (ref 7.35–7.45)
PH ARTERIAL: 7.08 (ref 7.35–7.45)
PH ARTERIAL: 7.11 (ref 7.35–7.45)
PH ARTERIAL: 7.16 (ref 7.35–7.45)
PH ARTERIAL: 7.18 (ref 7.35–7.45)
PH ARTERIAL: 7.18 (ref 7.35–7.45)
PH ARTERIAL: 7.22 (ref 7.35–7.45)
PH ARTERIAL: 7.23 (ref 7.35–7.45)
PH ARTERIAL: 7.24 (ref 7.35–7.45)
PH ARTERIAL: 7.24 (ref 7.35–7.45)
PH ARTERIAL: 7.28 (ref 7.35–7.45)
PH ARTERIAL: 7.29 (ref 7.35–7.45)
PH ARTERIAL: 7.3 (ref 7.35–7.45)
PH ARTERIAL: 7.31 (ref 7.35–7.45)
PH ARTERIAL: 7.33 (ref 7.35–7.45)
PH ARTERIAL: 7.34 (ref 7.35–7.45)
PH ARTERIAL: 7.36 (ref 7.35–7.45)
PH ARTERIAL: 7.38 (ref 7.35–7.45)
PH ARTERIAL: 7.4 (ref 7.35–7.45)
PH ARTERIAL: 7.4 (ref 7.35–7.45)
PH ARTERIAL: 7.41 (ref 7.35–7.45)
PHOSPHORUS: 3.5 MG/DL (ref 2.3–4.8)
PLATELET # BLD: 121 K/UL (ref 130–400)
PLATELET # BLD: 134 K/UL (ref 130–400)
PLATELET # BLD: 162 K/UL (ref 130–400)
PLATELET # BLD: 175 K/UL (ref 130–400)
PLATELET # BLD: 181 K/UL (ref 130–400)
PLATELET # BLD: 190 K/UL (ref 130–400)
PLATELET # BLD: 192 K/UL (ref 130–400)
PLATELET # BLD: 201 K/UL (ref 130–400)
PLATELET # BLD: 206 K/UL (ref 130–400)
PLATELET # BLD: 240 K/UL (ref 130–400)
PLATELET # BLD: 242 K/UL (ref 130–400)
PLATELET # BLD: 261 K/UL (ref 130–400)
PLATELET # BLD: 261 K/UL (ref 130–400)
PLATELET # BLD: 281 K/UL (ref 130–400)
PLATELET # BLD: 282 K/UL (ref 130–400)
PLATELET # BLD: 284 K/UL (ref 130–400)
PLATELET # BLD: 310 K/UL (ref 130–400)
PLATELET # BLD: 323 K/UL (ref 130–400)
PLATELET SLIDE REVIEW: NORMAL
PO2 ARTERIAL: 26 MM HG (ref 75–108)
PO2 ARTERIAL: 28 MM HG (ref 75–108)
PO2 ARTERIAL: 43 MM HG (ref 75–108)
PO2 ARTERIAL: 48 MM HG (ref 75–108)
PO2 ARTERIAL: 51 MM HG (ref 75–108)
PO2 ARTERIAL: 54 MM HG (ref 75–108)
PO2 ARTERIAL: 55 MM HG (ref 75–108)
PO2 ARTERIAL: 55 MM HG (ref 75–108)
PO2 ARTERIAL: 57 MM HG (ref 75–108)
PO2 ARTERIAL: 60 MM HG (ref 75–108)
PO2 ARTERIAL: 60 MM HG (ref 75–108)
PO2 ARTERIAL: 61 MM HG (ref 75–108)
PO2 ARTERIAL: 62 MM HG (ref 75–108)
PO2 ARTERIAL: 62 MM HG (ref 75–108)
PO2 ARTERIAL: 64 MM HG (ref 75–108)
PO2 ARTERIAL: 64 MM HG (ref 75–108)
PO2 ARTERIAL: 65 MM HG (ref 75–108)
PO2 ARTERIAL: 67 MM HG (ref 75–108)
PO2 ARTERIAL: 68 MM HG (ref 75–108)
PO2 ARTERIAL: 68 MM HG (ref 75–108)
PO2 ARTERIAL: 69 MM HG (ref 75–108)
PO2 ARTERIAL: 70 MM HG (ref 75–108)
PO2 ARTERIAL: 70 MM HG (ref 75–108)
PO2 ARTERIAL: 71 MM HG (ref 75–108)
PO2 ARTERIAL: 72 MM HG (ref 75–108)
PO2 ARTERIAL: 81 MM HG (ref 75–108)
PO2 ARTERIAL: 83 MM HG (ref 75–108)
PO2 ARTERIAL: 84 MM HG (ref 75–108)
PO2 ARTERIAL: 86 MM HG (ref 75–108)
PO2 ARTERIAL: 93 MM HG (ref 75–108)
PO2 ARTERIAL: 95 MM HG (ref 75–108)
POC CHLORIDE: 100 MEQ/L (ref 99–110)
POC CHLORIDE: 101 MEQ/L (ref 99–110)
POC CHLORIDE: 102 MEQ/L (ref 99–110)
POC CHLORIDE: 80 MEQ/L (ref 99–110)
POC CHLORIDE: 92 MEQ/L (ref 99–110)
POC CHLORIDE: 92 MEQ/L (ref 99–110)
POC CHLORIDE: 93 MEQ/L (ref 99–110)
POC CHLORIDE: 93 MEQ/L (ref 99–110)
POC CHLORIDE: 94 MEQ/L (ref 99–110)
POC CHLORIDE: 94 MEQ/L (ref 99–110)
POC CHLORIDE: 95 MEQ/L (ref 99–110)
POC CHLORIDE: 97 MEQ/L (ref 99–110)
POC CHLORIDE: 98 MEQ/L (ref 99–110)
POC CHLORIDE: 99 MEQ/L (ref 99–110)
POC CREATININE WHOLE BLOOD: 1.4
POC CREATININE: 0.9 MG/DL (ref 0.9–1.3)
POC CREATININE: 0.9 MG/DL (ref 0.9–1.3)
POC CREATININE: 1 MG/DL (ref 0.9–1.3)
POC CREATININE: 1.1 MG/DL (ref 0.9–1.3)
POC CREATININE: 1.2 MG/DL (ref 0.9–1.3)
POC CREATININE: 1.2 MG/DL (ref 0.9–1.3)
POC CREATININE: 1.4 MG/DL (ref 0.9–1.3)
POC CREATININE: 1.5 MG/DL (ref 0.9–1.3)
POC CREATININE: 1.7 MG/DL (ref 0.9–1.3)
POC CREATININE: 1.7 MG/DL (ref 0.9–1.3)
POC CREATININE: 2.2 MG/DL (ref 0.9–1.3)
POC CREATININE: 2.4 MG/DL (ref 0.9–1.3)
POC CREATININE: 2.6 MG/DL (ref 0.9–1.3)
POC CREATININE: 3.7 MG/DL (ref 0.9–1.3)
POC CREATININE: 3.9 MG/DL (ref 0.9–1.3)
POC CREATININE: 5 MG/DL (ref 0.9–1.3)
POC CREATININE: 5 MG/DL (ref 0.9–1.3)
POC CREATININE: 6.2 MG/DL (ref 0.9–1.3)
POC FIO2: 100
POC FIO2: 21
POC FIO2: 70
POC FIO2: 90
POC HEMATOCRIT: 15 % (ref 41–53)
POC HEMATOCRIT: 20 % (ref 41–53)
POC HEMATOCRIT: 28 % (ref 41–53)
POC HEMATOCRIT: 32 % (ref 41–53)
POC HEMATOCRIT: 34 % (ref 41–53)
POC HEMATOCRIT: 39 % (ref 41–53)
POC HEMATOCRIT: 45 % (ref 41–53)
POC HEMATOCRIT: 49 % (ref 41–53)
POC HEMATOCRIT: 50 % (ref 41–53)
POC HEMATOCRIT: 51 % (ref 41–53)
POC HEMATOCRIT: 51 % (ref 41–53)
POC HEMATOCRIT: 52 % (ref 41–53)
POC HEMATOCRIT: 52 % (ref 41–53)
POC HEMATOCRIT: 53 % (ref 41–53)
POC HEMATOCRIT: 53 % (ref 41–53)
POC HEMATOCRIT: 54 % (ref 41–53)
POC HEMATOCRIT: 55 % (ref 41–53)
POC HEMATOCRIT: 56 % (ref 41–53)
POC HEMATOCRIT: 57 % (ref 41–53)
POC HEMATOCRIT: 58 % (ref 41–53)
POC HEMATOCRIT: 59 % (ref 41–53)
POC POTASSIUM: 3.9 MEQ/L (ref 3.5–5.1)
POC POTASSIUM: 4.2 MEQ/L (ref 3.5–5.1)
POC POTASSIUM: 4.2 MEQ/L (ref 3.5–5.1)
POC POTASSIUM: 4.4 MEQ/L (ref 3.5–5.1)
POC POTASSIUM: 4.5 MEQ/L (ref 3.5–5.1)
POC POTASSIUM: 4.6 MEQ/L (ref 3.5–5.1)
POC POTASSIUM: 4.7 MEQ/L (ref 3.5–5.1)
POC POTASSIUM: 4.8 MEQ/L (ref 3.5–5.1)
POC POTASSIUM: 5 MEQ/L (ref 3.5–5.1)
POC POTASSIUM: 5.1 MEQ/L (ref 3.5–5.1)
POC POTASSIUM: 5.1 MEQ/L (ref 3.5–5.1)
POC POTASSIUM: 5.2 MEQ/L (ref 3.5–5.1)
POC POTASSIUM: 5.3 MEQ/L (ref 3.5–5.1)
POC POTASSIUM: 5.3 MEQ/L (ref 3.5–5.1)
POC POTASSIUM: 5.5 MEQ/L (ref 3.5–5.1)
POC POTASSIUM: 5.6 MEQ/L (ref 3.5–5.1)
POC POTASSIUM: 5.6 MEQ/L (ref 3.5–5.1)
POC POTASSIUM: 5.8 MEQ/L (ref 3.5–5.1)
POC POTASSIUM: 5.9 MEQ/L (ref 3.5–5.1)
POC POTASSIUM: 6.1 MEQ/L (ref 3.5–5.1)
POC POTASSIUM: 6.9 MEQ/L (ref 3.5–5.1)
POC POTASSIUM: 7 MEQ/L (ref 3.5–5.1)
POC POTASSIUM: 7.4 MEQ/L (ref 3.5–5.1)
POC POTASSIUM: 7.8 MEQ/L (ref 3.5–5.1)
POC SAMPLE TYPE: ABNORMAL
POC SODIUM: 131 MEQ/L (ref 136–145)
POC SODIUM: 132 MEQ/L (ref 136–145)
POC SODIUM: 134 MEQ/L (ref 136–145)
POC SODIUM: 135 MEQ/L (ref 136–145)
POC SODIUM: 135 MEQ/L (ref 136–145)
POC SODIUM: 136 MEQ/L (ref 136–145)
POC SODIUM: 137 MEQ/L (ref 136–145)
POC SODIUM: 138 MEQ/L (ref 136–145)
POC SODIUM: 139 MEQ/L (ref 136–145)
POC SODIUM: 140 MEQ/L (ref 136–145)
POC SODIUM: 140 MEQ/L (ref 136–145)
POC SODIUM: 141 MEQ/L (ref 136–145)
POC SODIUM: 141 MEQ/L (ref 136–145)
POC SODIUM: 145 MEQ/L (ref 136–145)
POC SODIUM: 147 MEQ/L (ref 136–145)
POIKILOCYTES: ABNORMAL
POLYCHROMASIA: 0
POTASSIUM REFLEX MAGNESIUM: 4.3 MEQ/L (ref 3.4–4.9)
POTASSIUM REFLEX MAGNESIUM: 4.5 MEQ/L (ref 3.4–4.9)
POTASSIUM REFLEX MAGNESIUM: 4.6 MEQ/L (ref 3.4–4.9)
POTASSIUM REFLEX MAGNESIUM: 4.6 MEQ/L (ref 3.4–4.9)
POTASSIUM REFLEX MAGNESIUM: 4.7 MEQ/L (ref 3.4–4.9)
POTASSIUM REFLEX MAGNESIUM: 4.7 MEQ/L (ref 3.4–4.9)
POTASSIUM REFLEX MAGNESIUM: 4.8 MEQ/L (ref 3.4–4.9)
POTASSIUM REFLEX MAGNESIUM: 5 MEQ/L (ref 3.4–4.9)
POTASSIUM REFLEX MAGNESIUM: 5.2 MEQ/L (ref 3.4–4.9)
POTASSIUM REFLEX MAGNESIUM: 5.4 MEQ/L (ref 3.4–4.9)
POTASSIUM REFLEX MAGNESIUM: 5.6 MEQ/L (ref 3.4–4.9)
POTASSIUM REFLEX MAGNESIUM: 5.6 MEQ/L (ref 3.4–4.9)
POTASSIUM REFLEX MAGNESIUM: 5.7 MEQ/L (ref 3.4–4.9)
POTASSIUM REFLEX MAGNESIUM: 5.8 MEQ/L (ref 3.4–4.9)
POTASSIUM REFLEX MAGNESIUM: 5.9 MEQ/L (ref 3.4–4.9)
POTASSIUM SERPL-SCNC: 4.6 MEQ/L (ref 3.4–4.9)
POTASSIUM SERPL-SCNC: 5 MEQ/L (ref 3.4–4.9)
POTASSIUM SERPL-SCNC: 5.9 MEQ/L (ref 3.4–4.9)
POTASSIUM SERPL-SCNC: 6.2 MEQ/L (ref 3.4–4.9)
PRO-BNP: 61 PG/ML
PROCALCITONIN: 0.14 NG/ML (ref 0–0.15)
PROCALCITONIN: 0.14 NG/ML (ref 0–0.15)
PROCALCITONIN: 0.24 NG/ML (ref 0–0.15)
PROCALCITONIN: 0.31 NG/ML (ref 0–0.15)
PROCALCITONIN: 0.35 NG/ML (ref 0–0.15)
PROCALCITONIN: 0.37 NG/ML (ref 0–0.15)
PROMYELOCYTES PERCENT: 1 %
PROMYELOCYTES PERCENT: 2 %
PROTHROMBIN TIME: 12.8 SEC (ref 12.3–14.9)
PROTHROMBIN TIME: 15.3 SEC (ref 12.3–14.9)
RAPID HIV 1&2: NEGATIVE
RBC # BLD: 3.25 M/UL (ref 4.7–6.1)
RBC # BLD: 4.08 M/UL (ref 4.7–6.1)
RBC # BLD: 4.4 M/UL (ref 4.7–6.1)
RBC # BLD: 5.32 M/UL (ref 4.7–6.1)
RBC # BLD: 5.48 M/UL (ref 4.7–6.1)
RBC # BLD: 5.65 M/UL (ref 4.7–6.1)
RBC # BLD: 5.73 M/UL (ref 4.7–6.1)
RBC # BLD: 5.74 M/UL (ref 4.7–6.1)
RBC # BLD: 5.78 M/UL (ref 4.7–6.1)
RBC # BLD: 5.79 M/UL (ref 4.7–6.1)
RBC # BLD: 5.82 M/UL (ref 4.7–6.1)
RBC # BLD: 5.9 M/UL (ref 4.7–6.1)
RBC # BLD: 5.91 M/UL (ref 4.7–6.1)
RBC # BLD: 5.94 M/UL (ref 4.7–6.1)
RBC # BLD: 5.95 M/UL (ref 4.7–6.1)
RBC # BLD: 6.14 M/UL (ref 4.7–6.1)
RBC # BLD: 6.19 M/UL (ref 4.7–6.1)
RBC # BLD: 6.33 M/UL (ref 4.7–6.1)
RBC # BLD: NORMAL 10*6/UL
REASON FOR REJECTION: NORMAL
REASON FOR REJECTION: NORMAL
REJECTED TEST: NORMAL
REJECTED TEST: NORMAL
SARS-COV-2, NAAT: DETECTED
SARS-COV-2: DETECTED
SODIUM BLD-SCNC: 131 MEQ/L (ref 135–144)
SODIUM BLD-SCNC: 133 MEQ/L (ref 135–144)
SODIUM BLD-SCNC: 133 MEQ/L (ref 135–144)
SODIUM BLD-SCNC: 134 MEQ/L (ref 135–144)
SODIUM BLD-SCNC: 135 MEQ/L (ref 135–144)
SODIUM BLD-SCNC: 136 MEQ/L (ref 135–144)
SODIUM BLD-SCNC: 137 MEQ/L (ref 135–144)
SODIUM BLD-SCNC: 138 MEQ/L (ref 135–144)
SODIUM BLD-SCNC: 139 MEQ/L (ref 135–144)
SODIUM BLD-SCNC: 140 MEQ/L (ref 135–144)
SOURCE: ABNORMAL
STOMATOCYTES: ABNORMAL
STOMATOCYTES: ABNORMAL
TCO2 ARTERIAL: 26 (ref 22–29)
TCO2 ARTERIAL: 29 (ref 22–29)
TCO2 ARTERIAL: 30 (ref 22–29)
TCO2 ARTERIAL: 30 (ref 22–29)
TCO2 ARTERIAL: 31 (ref 22–29)
TCO2 ARTERIAL: 33 (ref 22–29)
TCO2 ARTERIAL: 35 (ref 22–29)
TCO2 ARTERIAL: 35 (ref 22–29)
TCO2 ARTERIAL: 37 (ref 22–29)
TCO2 ARTERIAL: 38 (ref 22–29)
TCO2 ARTERIAL: 40 (ref 22–29)
TCO2 ARTERIAL: 42 (ref 22–29)
TCO2 ARTERIAL: 42 (ref 22–29)
TCO2 ARTERIAL: 43 (ref 22–29)
TCO2 ARTERIAL: 44 (ref 22–29)
TCO2 ARTERIAL: 44 (ref 22–29)
TCO2 ARTERIAL: 46 (ref 22–29)
TCO2 ARTERIAL: 46 (ref 22–29)
TCO2 ARTERIAL: >50 (ref 22–29)
TOTAL CK: 53 U/L (ref 0–190)
TOTAL PROTEIN: 4.3 G/DL (ref 6.3–8)
TOTAL PROTEIN: 4.6 G/DL (ref 6.3–8)
TOTAL PROTEIN: 4.7 G/DL (ref 6.3–8)
TOTAL PROTEIN: 5.1 G/DL (ref 6.3–8)
TOTAL PROTEIN: 5.5 G/DL (ref 6.3–8)
TOTAL PROTEIN: 5.5 G/DL (ref 6.3–8)
TOTAL PROTEIN: 5.6 G/DL (ref 6.3–8)
TOTAL PROTEIN: 5.7 G/DL (ref 6.3–8)
TOTAL PROTEIN: 5.9 G/DL (ref 6.3–8)
TOTAL PROTEIN: 6.1 G/DL (ref 6.3–8)
TOTAL PROTEIN: 6.3 G/DL (ref 6.3–8)
TOTAL PROTEIN: 6.6 G/DL (ref 6.3–8)
TOTAL PROTEIN: 6.7 G/DL (ref 6.3–8)
TOTAL PROTEIN: 6.8 G/DL (ref 6.3–8)
TOTAL PROTEIN: 6.8 G/DL (ref 6.3–8)
TROPONIN: <0.01 NG/ML (ref 0–0.01)
URINE CULTURE, ROUTINE: NORMAL
VANCOMYCIN RANDOM: 9.9 UG/ML (ref 10–40)
VITAMIN D 25-HYDROXY: 35.5 NG/ML (ref 30–100)
WBC # BLD: 10.4 K/UL (ref 4.8–10.8)
WBC # BLD: 10.5 K/UL (ref 4.8–10.8)
WBC # BLD: 13.2 K/UL (ref 4.8–10.8)
WBC # BLD: 13.6 K/UL (ref 4.8–10.8)
WBC # BLD: 13.6 K/UL (ref 4.8–10.8)
WBC # BLD: 20.3 K/UL (ref 4.8–10.8)
WBC # BLD: 21.1 K/UL (ref 4.8–10.8)
WBC # BLD: 23.4 K/UL (ref 4.8–10.8)
WBC # BLD: 23.4 K/UL (ref 4.8–10.8)
WBC # BLD: 26 K/UL (ref 4.8–10.8)
WBC # BLD: 37.1 K/UL (ref 4.8–10.8)
WBC # BLD: 4.7 K/UL (ref 4.8–10.8)
WBC # BLD: 40.5 K/UL (ref 4.8–10.8)
WBC # BLD: 6.5 K/UL (ref 4.8–10.8)
WBC # BLD: 6.5 K/UL (ref 4.8–10.8)
WBC # BLD: 7.1 K/UL (ref 4.8–10.8)
WBC # BLD: 7.4 K/UL (ref 4.8–10.8)
WBC # BLD: 8.3 K/UL (ref 4.8–10.8)

## 2021-01-01 PROCEDURE — 2060000000 HC ICU INTERMEDIATE R&B

## 2021-01-01 PROCEDURE — 85014 HEMATOCRIT: CPT

## 2021-01-01 PROCEDURE — 85025 COMPLETE CBC W/AUTO DIFF WBC: CPT

## 2021-01-01 PROCEDURE — 99291 CRITICAL CARE FIRST HOUR: CPT | Performed by: INTERNAL MEDICINE

## 2021-01-01 PROCEDURE — 94760 N-INVAS EAR/PLS OXIMETRY 1: CPT

## 2021-01-01 PROCEDURE — 85379 FIBRIN DEGRADATION QUANT: CPT

## 2021-01-01 PROCEDURE — 37799 UNLISTED PX VASCULAR SURGERY: CPT

## 2021-01-01 PROCEDURE — 80053 COMPREHEN METABOLIC PANEL: CPT

## 2021-01-01 PROCEDURE — 85520 HEPARIN ASSAY: CPT

## 2021-01-01 PROCEDURE — 94761 N-INVAS EAR/PLS OXIMETRY MLT: CPT

## 2021-01-01 PROCEDURE — 82330 ASSAY OF CALCIUM: CPT

## 2021-01-01 PROCEDURE — 2700000000 HC OXYGEN THERAPY PER DAY

## 2021-01-01 PROCEDURE — 82435 ASSAY OF BLOOD CHLORIDE: CPT

## 2021-01-01 PROCEDURE — 94003 VENT MGMT INPAT SUBQ DAY: CPT

## 2021-01-01 PROCEDURE — 6370000000 HC RX 637 (ALT 250 FOR IP): Performed by: INTERNAL MEDICINE

## 2021-01-01 PROCEDURE — 6360000002 HC RX W HCPCS: Performed by: INTERNAL MEDICINE

## 2021-01-01 PROCEDURE — 2580000003 HC RX 258: Performed by: NURSE PRACTITIONER

## 2021-01-01 PROCEDURE — 84484 ASSAY OF TROPONIN QUANT: CPT

## 2021-01-01 PROCEDURE — 36415 COLL VENOUS BLD VENIPUNCTURE: CPT

## 2021-01-01 PROCEDURE — 32551 INSERTION OF CHEST TUBE: CPT

## 2021-01-01 PROCEDURE — 2580000003 HC RX 258: Performed by: INTERNAL MEDICINE

## 2021-01-01 PROCEDURE — 2500000003 HC RX 250 WO HCPCS: Performed by: NURSE PRACTITIONER

## 2021-01-01 PROCEDURE — 2500000003 HC RX 250 WO HCPCS: Performed by: INTERNAL MEDICINE

## 2021-01-01 PROCEDURE — 82803 BLOOD GASES ANY COMBINATION: CPT

## 2021-01-01 PROCEDURE — 87086 URINE CULTURE/COLONY COUNT: CPT

## 2021-01-01 PROCEDURE — 82306 VITAMIN D 25 HYDROXY: CPT

## 2021-01-01 PROCEDURE — 71045 X-RAY EXAM CHEST 1 VIEW: CPT

## 2021-01-01 PROCEDURE — 6360000002 HC RX W HCPCS: Performed by: NURSE PRACTITIONER

## 2021-01-01 PROCEDURE — 82565 ASSAY OF CREATININE: CPT

## 2021-01-01 PROCEDURE — 82728 ASSAY OF FERRITIN: CPT

## 2021-01-01 PROCEDURE — 85610 PROTHROMBIN TIME: CPT

## 2021-01-01 PROCEDURE — 2500000003 HC RX 250 WO HCPCS

## 2021-01-01 PROCEDURE — 85384 FIBRINOGEN ACTIVITY: CPT

## 2021-01-01 PROCEDURE — 2000000000 HC ICU R&B

## 2021-01-01 PROCEDURE — 85730 THROMBOPLASTIN TIME PARTIAL: CPT

## 2021-01-01 PROCEDURE — 0BH17EZ INSERTION OF ENDOTRACHEAL AIRWAY INTO TRACHEA, VIA NATURAL OR ARTIFICIAL OPENING: ICD-10-PCS | Performed by: INTERNAL MEDICINE

## 2021-01-01 PROCEDURE — 86141 C-REACTIVE PROTEIN HS: CPT

## 2021-01-01 PROCEDURE — 84145 PROCALCITONIN (PCT): CPT

## 2021-01-01 PROCEDURE — 2580000003 HC RX 258: Performed by: EMERGENCY MEDICINE

## 2021-01-01 PROCEDURE — 99233 SBSQ HOSP IP/OBS HIGH 50: CPT | Performed by: INTERNAL MEDICINE

## 2021-01-01 PROCEDURE — 94660 CPAP INITIATION&MGMT: CPT

## 2021-01-01 PROCEDURE — 99231 SBSQ HOSP IP/OBS SF/LOW 25: CPT | Performed by: THORACIC SURGERY (CARDIOTHORACIC VASCULAR SURGERY)

## 2021-01-01 PROCEDURE — 87077 CULTURE AEROBIC IDENTIFY: CPT

## 2021-01-01 PROCEDURE — 82948 REAGENT STRIP/BLOOD GLUCOSE: CPT

## 2021-01-01 PROCEDURE — 84295 ASSAY OF SERUM SODIUM: CPT

## 2021-01-01 PROCEDURE — 36600 WITHDRAWAL OF ARTERIAL BLOOD: CPT

## 2021-01-01 PROCEDURE — 94002 VENT MGMT INPAT INIT DAY: CPT

## 2021-01-01 PROCEDURE — 76937 US GUIDE VASCULAR ACCESS: CPT | Performed by: INTERNAL MEDICINE

## 2021-01-01 PROCEDURE — C9113 INJ PANTOPRAZOLE SODIUM, VIA: HCPCS | Performed by: INTERNAL MEDICINE

## 2021-01-01 PROCEDURE — 83605 ASSAY OF LACTIC ACID: CPT

## 2021-01-01 PROCEDURE — 93010 ELECTROCARDIOGRAM REPORT: CPT | Performed by: INTERNAL MEDICINE

## 2021-01-01 PROCEDURE — 94640 AIRWAY INHALATION TREATMENT: CPT

## 2021-01-01 PROCEDURE — 99284 EMERGENCY DEPT VISIT MOD MDM: CPT

## 2021-01-01 PROCEDURE — 84132 ASSAY OF SERUM POTASSIUM: CPT

## 2021-01-01 PROCEDURE — P9041 ALBUMIN (HUMAN),5%, 50ML: HCPCS | Performed by: INTERNAL MEDICINE

## 2021-01-01 PROCEDURE — 1210000000 HC MED SURG R&B

## 2021-01-01 PROCEDURE — 99223 1ST HOSP IP/OBS HIGH 75: CPT | Performed by: INTERNAL MEDICINE

## 2021-01-01 PROCEDURE — 36620 INSERTION CATHETER ARTERY: CPT

## 2021-01-01 PROCEDURE — 32551 INSERTION OF CHEST TUBE: CPT | Performed by: THORACIC SURGERY (CARDIOTHORACIC VASCULAR SURGERY)

## 2021-01-01 PROCEDURE — 6370000000 HC RX 637 (ALT 250 FOR IP): Performed by: NURSE PRACTITIONER

## 2021-01-01 PROCEDURE — 5A1955Z RESPIRATORY VENTILATION, GREATER THAN 96 CONSECUTIVE HOURS: ICD-10-PCS | Performed by: INTERNAL MEDICINE

## 2021-01-01 PROCEDURE — 2580000003 HC RX 258

## 2021-01-01 PROCEDURE — 87205 SMEAR GRAM STAIN: CPT

## 2021-01-01 PROCEDURE — 31500 INSERT EMERGENCY AIRWAY: CPT

## 2021-01-01 PROCEDURE — 0W9930Z DRAINAGE OF RIGHT PLEURAL CAVITY WITH DRAINAGE DEVICE, PERCUTANEOUS APPROACH: ICD-10-PCS | Performed by: INTERNAL MEDICINE

## 2021-01-01 PROCEDURE — 93005 ELECTROCARDIOGRAM TRACING: CPT | Performed by: INTERNAL MEDICINE

## 2021-01-01 PROCEDURE — 86140 C-REACTIVE PROTEIN: CPT

## 2021-01-01 PROCEDURE — 99497 ADVNCD CARE PLAN 30 MIN: CPT | Performed by: FAMILY MEDICINE

## 2021-01-01 PROCEDURE — 80074 ACUTE HEPATITIS PANEL: CPT

## 2021-01-01 PROCEDURE — 6360000002 HC RX W HCPCS: Performed by: EMERGENCY MEDICINE

## 2021-01-01 PROCEDURE — 83880 ASSAY OF NATRIURETIC PEPTIDE: CPT

## 2021-01-01 PROCEDURE — 87040 BLOOD CULTURE FOR BACTERIA: CPT

## 2021-01-01 PROCEDURE — 99222 1ST HOSP IP/OBS MODERATE 55: CPT | Performed by: FAMILY MEDICINE

## 2021-01-01 PROCEDURE — 36556 INSERT NON-TUNNEL CV CATH: CPT | Performed by: INTERNAL MEDICINE

## 2021-01-01 PROCEDURE — 36620 INSERTION CATHETER ARTERY: CPT | Performed by: INTERNAL MEDICINE

## 2021-01-01 PROCEDURE — 74018 RADEX ABDOMEN 1 VIEW: CPT

## 2021-01-01 PROCEDURE — 83036 HEMOGLOBIN GLYCOSYLATED A1C: CPT

## 2021-01-01 PROCEDURE — 82550 ASSAY OF CK (CPK): CPT

## 2021-01-01 PROCEDURE — 87070 CULTURE OTHR SPECIMN AEROBIC: CPT

## 2021-01-01 PROCEDURE — 93005 ELECTROCARDIOGRAM TRACING: CPT | Performed by: EMERGENCY MEDICINE

## 2021-01-01 PROCEDURE — 96366 THER/PROPH/DIAG IV INF ADDON: CPT

## 2021-01-01 PROCEDURE — 94664 DEMO&/EVAL PT USE INHALER: CPT

## 2021-01-01 PROCEDURE — 85027 COMPLETE CBC AUTOMATED: CPT

## 2021-01-01 PROCEDURE — 99285 EMERGENCY DEPT VISIT HI MDM: CPT

## 2021-01-01 PROCEDURE — 99232 SBSQ HOSP IP/OBS MODERATE 35: CPT | Performed by: INTERNAL MEDICINE

## 2021-01-01 PROCEDURE — 32551 INSERTION OF CHEST TUBE: CPT | Performed by: INTERNAL MEDICINE

## 2021-01-01 PROCEDURE — U0003 INFECTIOUS AGENT DETECTION BY NUCLEIC ACID (DNA OR RNA); SEVERE ACUTE RESPIRATORY SYNDROME CORONAVIRUS 2 (SARS-COV-2) (CORONAVIRUS DISEASE [COVID-19]), AMPLIFIED PROBE TECHNIQUE, MAKING USE OF HIGH THROUGHPUT TECHNOLOGIES AS DESCRIBED BY CMS-2020-01-R: HCPCS

## 2021-01-01 PROCEDURE — 99233 SBSQ HOSP IP/OBS HIGH 50: CPT | Performed by: FAMILY MEDICINE

## 2021-01-01 PROCEDURE — 96375 TX/PRO/DX INJ NEW DRUG ADDON: CPT

## 2021-01-01 PROCEDURE — 71275 CT ANGIOGRAPHY CHEST: CPT

## 2021-01-01 PROCEDURE — 31500 INSERT EMERGENCY AIRWAY: CPT | Performed by: INTERNAL MEDICINE

## 2021-01-01 PROCEDURE — 87389 HIV-1 AG W/HIV-1&-2 AB AG IA: CPT

## 2021-01-01 PROCEDURE — XW033H5 INTRODUCTION OF TOCILIZUMAB INTO PERIPHERAL VEIN, PERCUTANEOUS APPROACH, NEW TECHNOLOGY GROUP 5: ICD-10-PCS | Performed by: INTERNAL MEDICINE

## 2021-01-01 PROCEDURE — 87147 CULTURE TYPE IMMUNOLOGIC: CPT

## 2021-01-01 PROCEDURE — U0005 INFEC AGEN DETEC AMPLI PROBE: HCPCS

## 2021-01-01 PROCEDURE — 99222 1ST HOSP IP/OBS MODERATE 55: CPT | Performed by: INTERNAL MEDICINE

## 2021-01-01 PROCEDURE — 6360000004 HC RX CONTRAST MEDICATION: Performed by: EMERGENCY MEDICINE

## 2021-01-01 PROCEDURE — 6370000000 HC RX 637 (ALT 250 FOR IP): Performed by: EMERGENCY MEDICINE

## 2021-01-01 PROCEDURE — 0W9930Z DRAINAGE OF RIGHT PLEURAL CAVITY WITH DRAINAGE DEVICE, PERCUTANEOUS APPROACH: ICD-10-PCS | Performed by: THORACIC SURGERY (CARDIOTHORACIC VASCULAR SURGERY)

## 2021-01-01 PROCEDURE — 36556 INSERT NON-TUNNEL CV CATH: CPT

## 2021-01-01 PROCEDURE — XW033E5 INTRODUCTION OF REMDESIVIR ANTI-INFECTIVE INTO PERIPHERAL VEIN, PERCUTANEOUS APPROACH, NEW TECHNOLOGY GROUP 5: ICD-10-PCS | Performed by: INTERNAL MEDICINE

## 2021-01-01 PROCEDURE — 87635 SARS-COV-2 COVID-19 AMP PRB: CPT

## 2021-01-01 PROCEDURE — 87186 SC STD MICRODIL/AGAR DIL: CPT

## 2021-01-01 PROCEDURE — 96365 THER/PROPH/DIAG IV INF INIT: CPT

## 2021-01-01 PROCEDURE — XW0DXM6 INTRODUCTION OF BARICITINIB INTO MOUTH AND PHARYNX, EXTERNAL APPROACH, NEW TECHNOLOGY GROUP 6: ICD-10-PCS | Performed by: INTERNAL MEDICINE

## 2021-01-01 PROCEDURE — 80202 ASSAY OF VANCOMYCIN: CPT

## 2021-01-01 RX ORDER — CALCIUM CHLORIDE 100 MG/ML
1000 INJECTION INTRAVENOUS; INTRAVENTRICULAR ONCE
Status: COMPLETED | OUTPATIENT
Start: 2021-01-01 | End: 2021-01-01

## 2021-01-01 RX ORDER — EPINEPHRINE 0.1 MG/ML
SYRINGE (ML) INJECTION
Status: COMPLETED | OUTPATIENT
Start: 2021-01-01 | End: 2021-01-01

## 2021-01-01 RX ORDER — MIDAZOLAM HYDROCHLORIDE 2 MG/2ML
4 INJECTION, SOLUTION INTRAMUSCULAR; INTRAVENOUS ONCE
Status: COMPLETED | OUTPATIENT
Start: 2021-01-01 | End: 2021-01-01

## 2021-01-01 RX ORDER — ONDANSETRON 4 MG/1
4 TABLET, ORALLY DISINTEGRATING ORAL EVERY 8 HOURS PRN
Status: DISCONTINUED | OUTPATIENT
Start: 2021-01-01 | End: 2021-01-01 | Stop reason: HOSPADM

## 2021-01-01 RX ORDER — NICOTINE POLACRILEX 4 MG
15 LOZENGE BUCCAL PRN
Status: DISCONTINUED | OUTPATIENT
Start: 2021-01-01 | End: 2021-01-01

## 2021-01-01 RX ORDER — FUROSEMIDE 10 MG/ML
20 INJECTION INTRAMUSCULAR; INTRAVENOUS ONCE
Status: COMPLETED | OUTPATIENT
Start: 2021-01-01 | End: 2021-01-01

## 2021-01-01 RX ORDER — FENTANYL CITRATE 50 UG/ML
50 INJECTION, SOLUTION INTRAMUSCULAR; INTRAVENOUS ONCE
Status: COMPLETED | OUTPATIENT
Start: 2021-01-01 | End: 2021-01-01

## 2021-01-01 RX ORDER — HEPARIN SODIUM 1000 [USP'U]/ML
5000 INJECTION, SOLUTION INTRAVENOUS; SUBCUTANEOUS PRN
Status: DISCONTINUED | OUTPATIENT
Start: 2021-01-01 | End: 2021-01-01 | Stop reason: HOSPADM

## 2021-01-01 RX ORDER — SODIUM CHLORIDE 9 MG/ML
25 INJECTION, SOLUTION INTRAVENOUS PRN
Status: DISCONTINUED | OUTPATIENT
Start: 2021-01-01 | End: 2021-01-01 | Stop reason: HOSPADM

## 2021-01-01 RX ORDER — SODIUM CHLORIDE 0.9 % (FLUSH) 0.9 %
5-40 SYRINGE (ML) INJECTION EVERY 12 HOURS SCHEDULED
Status: DISCONTINUED | OUTPATIENT
Start: 2021-01-01 | End: 2021-01-01 | Stop reason: HOSPADM

## 2021-01-01 RX ORDER — FLUCONAZOLE 2 MG/ML
400 INJECTION, SOLUTION INTRAVENOUS EVERY 24 HOURS
Status: DISCONTINUED | OUTPATIENT
Start: 2021-01-01 | End: 2021-01-01 | Stop reason: ALTCHOICE

## 2021-01-01 RX ORDER — LABETALOL HYDROCHLORIDE 5 MG/ML
10 INJECTION, SOLUTION INTRAVENOUS ONCE
Status: COMPLETED | OUTPATIENT
Start: 2021-01-01 | End: 2021-01-01

## 2021-01-01 RX ORDER — DEXTROSE MONOHYDRATE 25 G/50ML
25 INJECTION, SOLUTION INTRAVENOUS ONCE
Status: COMPLETED | OUTPATIENT
Start: 2021-01-01 | End: 2021-01-01

## 2021-01-01 RX ORDER — DEXTROSE MONOHYDRATE 50 MG/ML
100 INJECTION, SOLUTION INTRAVENOUS PRN
Status: DISCONTINUED | OUTPATIENT
Start: 2021-01-01 | End: 2021-01-01

## 2021-01-01 RX ORDER — METOCLOPRAMIDE HYDROCHLORIDE 5 MG/ML
10 INJECTION INTRAMUSCULAR; INTRAVENOUS EVERY 8 HOURS
Status: DISCONTINUED | OUTPATIENT
Start: 2021-01-01 | End: 2021-01-01 | Stop reason: HOSPADM

## 2021-01-01 RX ORDER — DOCUSATE SODIUM 100 MG/1
100 CAPSULE, LIQUID FILLED ORAL 2 TIMES DAILY
Status: DISCONTINUED | OUTPATIENT
Start: 2021-01-01 | End: 2021-01-01

## 2021-01-01 RX ORDER — CALCIUM CHLORIDE 100 MG/ML
INJECTION INTRAVENOUS; INTRAVENTRICULAR
Status: COMPLETED | OUTPATIENT
Start: 2021-01-01 | End: 2021-01-01

## 2021-01-01 RX ORDER — LEVOFLOXACIN 5 MG/ML
500 INJECTION, SOLUTION INTRAVENOUS EVERY 24 HOURS
Status: DISCONTINUED | OUTPATIENT
Start: 2021-01-01 | End: 2021-01-01

## 2021-01-01 RX ORDER — CISATRACURIUM BESYLATE 2 MG/ML
.5-1 INJECTION, SOLUTION INTRAVENOUS CONTINUOUS
Status: DISCONTINUED | OUTPATIENT
Start: 2021-01-01 | End: 2021-01-01 | Stop reason: RX

## 2021-01-01 RX ORDER — CALCIUM GLUCONATE 94 MG/ML
1000 INJECTION, SOLUTION INTRAVENOUS ONCE
Status: COMPLETED | OUTPATIENT
Start: 2021-01-01 | End: 2021-01-01

## 2021-01-01 RX ORDER — HYDROXYZINE HYDROCHLORIDE 25 MG/1
25 TABLET, FILM COATED ORAL 3 TIMES DAILY PRN
Status: DISCONTINUED | OUTPATIENT
Start: 2021-01-01 | End: 2021-01-01 | Stop reason: HOSPADM

## 2021-01-01 RX ORDER — ETOMIDATE 2 MG/ML
40 INJECTION INTRAVENOUS ONCE
Status: COMPLETED | OUTPATIENT
Start: 2021-01-01 | End: 2021-01-01

## 2021-01-01 RX ORDER — SODIUM CHLORIDE 9 MG/ML
INJECTION, SOLUTION INTRAVENOUS
Status: DISPENSED
Start: 2021-01-01 | End: 2021-01-01

## 2021-01-01 RX ORDER — DEXTROSE MONOHYDRATE 25 G/50ML
12.5 INJECTION, SOLUTION INTRAVENOUS PRN
Status: DISCONTINUED | OUTPATIENT
Start: 2021-01-01 | End: 2021-01-01

## 2021-01-01 RX ORDER — FLUTICASONE PROPIONATE 50 MCG
2 SPRAY, SUSPENSION (ML) NASAL DAILY
Status: DISCONTINUED | OUTPATIENT
Start: 2021-01-01 | End: 2021-01-01

## 2021-01-01 RX ORDER — POLYETHYLENE GLYCOL 3350 17 G/17G
17 POWDER, FOR SOLUTION ORAL DAILY PRN
Status: DISCONTINUED | OUTPATIENT
Start: 2021-01-01 | End: 2021-01-01 | Stop reason: HOSPADM

## 2021-01-01 RX ORDER — FLUTICASONE PROPIONATE 50 MCG
2 SPRAY, SUSPENSION (ML) NASAL DAILY
COMMUNITY

## 2021-01-01 RX ORDER — HEPARIN SODIUM 5000 [USP'U]/ML
10000 INJECTION, SOLUTION INTRAVENOUS; SUBCUTANEOUS ONCE
Status: COMPLETED | OUTPATIENT
Start: 2021-01-01 | End: 2021-01-01

## 2021-01-01 RX ORDER — SODIUM CHLORIDE 0.9 % (FLUSH) 0.9 %
10 SYRINGE (ML) INJECTION 2 TIMES DAILY
Status: DISCONTINUED | OUTPATIENT
Start: 2021-01-01 | End: 2021-01-01

## 2021-01-01 RX ORDER — BUDESONIDE AND FORMOTEROL FUMARATE DIHYDRATE 160; 4.5 UG/1; UG/1
2 AEROSOL RESPIRATORY (INHALATION) 2 TIMES DAILY
Status: DISCONTINUED | OUTPATIENT
Start: 2021-01-01 | End: 2021-01-01 | Stop reason: HOSPADM

## 2021-01-01 RX ORDER — LACTULOSE 10 G/15ML
20 SOLUTION ORAL 3 TIMES DAILY
Status: DISCONTINUED | OUTPATIENT
Start: 2021-01-01 | End: 2021-01-01 | Stop reason: HOSPADM

## 2021-01-01 RX ORDER — NICOTINE POLACRILEX 4 MG
15 LOZENGE BUCCAL PRN
Status: DISCONTINUED | OUTPATIENT
Start: 2021-01-01 | End: 2021-01-01 | Stop reason: HOSPADM

## 2021-01-01 RX ORDER — PROPOFOL 10 MG/ML
5-50 INJECTION, EMULSION INTRAVENOUS
Status: DISCONTINUED | OUTPATIENT
Start: 2021-01-01 | End: 2021-01-01 | Stop reason: HOSPADM

## 2021-01-01 RX ORDER — HYDROCODONE BITARTRATE AND ACETAMINOPHEN 5; 325 MG/1; MG/1
1 TABLET ORAL EVERY 6 HOURS PRN
Status: DISCONTINUED | OUTPATIENT
Start: 2021-01-01 | End: 2021-01-01

## 2021-01-01 RX ORDER — LABETALOL HYDROCHLORIDE 5 MG/ML
10 INJECTION, SOLUTION INTRAVENOUS EVERY 4 HOURS PRN
Status: DISCONTINUED | OUTPATIENT
Start: 2021-01-01 | End: 2021-01-01 | Stop reason: HOSPADM

## 2021-01-01 RX ORDER — SODIUM CHLORIDE, SODIUM LACTATE, POTASSIUM CHLORIDE, CALCIUM CHLORIDE 600; 310; 30; 20 MG/100ML; MG/100ML; MG/100ML; MG/100ML
INJECTION, SOLUTION INTRAVENOUS CONTINUOUS
Status: DISCONTINUED | OUTPATIENT
Start: 2021-01-01 | End: 2021-01-01

## 2021-01-01 RX ORDER — DEXTROSE MONOHYDRATE 50 MG/ML
100 INJECTION, SOLUTION INTRAVENOUS PRN
Status: DISCONTINUED | OUTPATIENT
Start: 2021-01-01 | End: 2021-01-01 | Stop reason: HOSPADM

## 2021-01-01 RX ORDER — TRAZODONE HYDROCHLORIDE 50 MG/1
50 TABLET ORAL NIGHTLY
COMMUNITY

## 2021-01-01 RX ORDER — PANTOPRAZOLE SODIUM 40 MG/10ML
40 INJECTION, POWDER, LYOPHILIZED, FOR SOLUTION INTRAVENOUS DAILY
Status: DISCONTINUED | OUTPATIENT
Start: 2021-01-01 | End: 2021-01-01 | Stop reason: HOSPADM

## 2021-01-01 RX ORDER — ROCURONIUM BROMIDE 10 MG/ML
100 INJECTION, SOLUTION INTRAVENOUS ONCE
Status: COMPLETED | OUTPATIENT
Start: 2021-01-01 | End: 2021-01-01

## 2021-01-01 RX ORDER — FUROSEMIDE 10 MG/ML
40 INJECTION INTRAMUSCULAR; INTRAVENOUS ONCE
Status: COMPLETED | OUTPATIENT
Start: 2021-01-01 | End: 2021-01-01

## 2021-01-01 RX ORDER — DIPHENHYDRAMINE HYDROCHLORIDE 50 MG/ML
25 INJECTION INTRAMUSCULAR; INTRAVENOUS EVERY 6 HOURS PRN
Status: DISCONTINUED | OUTPATIENT
Start: 2021-01-01 | End: 2021-01-01 | Stop reason: HOSPADM

## 2021-01-01 RX ORDER — GUAIFENESIN/DEXTROMETHORPHAN 100-10MG/5
5 SYRUP ORAL EVERY 4 HOURS PRN
Status: DISCONTINUED | OUTPATIENT
Start: 2021-01-01 | End: 2021-01-01

## 2021-01-01 RX ORDER — DEXTROSE MONOHYDRATE 25 G/50ML
12.5 INJECTION, SOLUTION INTRAVENOUS PRN
Status: DISCONTINUED | OUTPATIENT
Start: 2021-01-01 | End: 2021-01-01 | Stop reason: HOSPADM

## 2021-01-01 RX ORDER — POLYETHYLENE GLYCOL 3350 17 G/17G
17 POWDER, FOR SOLUTION ORAL DAILY
Status: DISCONTINUED | OUTPATIENT
Start: 2021-01-01 | End: 2021-01-01 | Stop reason: HOSPADM

## 2021-01-01 RX ORDER — 0.9 % SODIUM CHLORIDE 0.9 %
500 INTRAVENOUS SOLUTION INTRAVENOUS ONCE
Status: COMPLETED | OUTPATIENT
Start: 2021-01-01 | End: 2021-01-01

## 2021-01-01 RX ORDER — DEXAMETHASONE 6 MG/1
6 TABLET ORAL DAILY
Status: DISPENSED | OUTPATIENT
Start: 2021-01-01 | End: 2021-01-01

## 2021-01-01 RX ORDER — ALBUTEROL SULFATE 90 UG/1
2 AEROSOL, METERED RESPIRATORY (INHALATION) EVERY 6 HOURS PRN
Status: DISCONTINUED | OUTPATIENT
Start: 2021-01-01 | End: 2021-01-01 | Stop reason: HOSPADM

## 2021-01-01 RX ORDER — ACETAMINOPHEN 650 MG/1
650 SUPPOSITORY RECTAL EVERY 6 HOURS PRN
Status: DISCONTINUED | OUTPATIENT
Start: 2021-01-01 | End: 2021-01-01 | Stop reason: HOSPADM

## 2021-01-01 RX ORDER — AZITHROMYCIN 250 MG/1
TABLET, FILM COATED ORAL
Qty: 1 PACKET | Refills: 0 | Status: SHIPPED | OUTPATIENT
Start: 2021-01-01 | End: 2021-01-01

## 2021-01-01 RX ORDER — ACETAMINOPHEN 500 MG
1000 TABLET ORAL ONCE
Status: COMPLETED | OUTPATIENT
Start: 2021-01-01 | End: 2021-01-01

## 2021-01-01 RX ORDER — BENZONATATE 100 MG/1
200 CAPSULE ORAL ONCE
Status: COMPLETED | OUTPATIENT
Start: 2021-01-01 | End: 2021-01-01

## 2021-01-01 RX ORDER — HYDROCODONE BITARTRATE AND ACETAMINOPHEN 5; 325 MG/1; MG/1
1 TABLET ORAL EVERY 6 HOURS PRN
Qty: 12 TABLET | Refills: 0 | Status: ON HOLD | OUTPATIENT
Start: 2021-01-01 | End: 2021-01-01

## 2021-01-01 RX ORDER — SODIUM CHLORIDE 9 MG/ML
INJECTION, SOLUTION INTRAVENOUS CONTINUOUS
Status: DISCONTINUED | OUTPATIENT
Start: 2021-01-01 | End: 2021-01-01

## 2021-01-01 RX ORDER — SODIUM CHLORIDE 9 MG/ML
INJECTION, SOLUTION INTRAVENOUS
Status: COMPLETED
Start: 2021-01-01 | End: 2021-01-01

## 2021-01-01 RX ORDER — BISACODYL 10 MG
10 SUPPOSITORY, RECTAL RECTAL DAILY
Status: DISCONTINUED | OUTPATIENT
Start: 2021-01-01 | End: 2021-01-01 | Stop reason: HOSPADM

## 2021-01-01 RX ORDER — TRAZODONE HYDROCHLORIDE 50 MG/1
50 TABLET ORAL NIGHTLY
Status: DISCONTINUED | OUTPATIENT
Start: 2021-01-01 | End: 2021-01-01 | Stop reason: HOSPADM

## 2021-01-01 RX ORDER — ALBUMIN, HUMAN INJ 5% 5 %
25 SOLUTION INTRAVENOUS ONCE
Status: COMPLETED | OUTPATIENT
Start: 2021-01-01 | End: 2021-01-01

## 2021-01-01 RX ORDER — METHYLPREDNISOLONE SODIUM SUCCINATE 125 MG/2ML
125 INJECTION, POWDER, LYOPHILIZED, FOR SOLUTION INTRAMUSCULAR; INTRAVENOUS ONCE
Status: COMPLETED | OUTPATIENT
Start: 2021-01-01 | End: 2021-01-01

## 2021-01-01 RX ORDER — BUDESONIDE AND FORMOTEROL FUMARATE DIHYDRATE 160; 4.5 UG/1; UG/1
2 AEROSOL RESPIRATORY (INHALATION) 2 TIMES DAILY
Status: DISCONTINUED | OUTPATIENT
Start: 2021-01-01 | End: 2021-01-01

## 2021-01-01 RX ORDER — ONDANSETRON 2 MG/ML
4 INJECTION INTRAMUSCULAR; INTRAVENOUS ONCE
Status: COMPLETED | OUTPATIENT
Start: 2021-01-01 | End: 2021-01-01

## 2021-01-01 RX ORDER — HEPARIN SODIUM 10000 [USP'U]/100ML
5-30 INJECTION, SOLUTION INTRAVENOUS CONTINUOUS
Status: DISCONTINUED | OUTPATIENT
Start: 2021-01-01 | End: 2021-01-01 | Stop reason: HOSPADM

## 2021-01-01 RX ORDER — 0.9 % SODIUM CHLORIDE 0.9 %
500 INTRAVENOUS SOLUTION INTRAVENOUS ONCE
Status: DISCONTINUED | OUTPATIENT
Start: 2021-01-01 | End: 2021-01-01

## 2021-01-01 RX ORDER — MAGNESIUM SULFATE IN WATER 40 MG/ML
2000 INJECTION, SOLUTION INTRAVENOUS ONCE
Status: COMPLETED | OUTPATIENT
Start: 2021-01-01 | End: 2021-01-01

## 2021-01-01 RX ORDER — HEPARIN SODIUM 5000 [USP'U]/ML
10000 INJECTION, SOLUTION INTRAVENOUS; SUBCUTANEOUS PRN
Status: DISCONTINUED | OUTPATIENT
Start: 2021-01-01 | End: 2021-01-01 | Stop reason: HOSPADM

## 2021-01-01 RX ORDER — ACETAMINOPHEN 325 MG/1
650 TABLET ORAL EVERY 6 HOURS PRN
Status: DISCONTINUED | OUTPATIENT
Start: 2021-01-01 | End: 2021-01-01 | Stop reason: HOSPADM

## 2021-01-01 RX ORDER — FUROSEMIDE 10 MG/ML
40 INJECTION INTRAMUSCULAR; INTRAVENOUS 3 TIMES DAILY
Status: DISCONTINUED | OUTPATIENT
Start: 2021-01-01 | End: 2021-01-01

## 2021-01-01 RX ORDER — HYDROCODONE BITARTRATE AND ACETAMINOPHEN 5; 325 MG/1; MG/1
1 TABLET ORAL EVERY 6 HOURS PRN
COMMUNITY

## 2021-01-01 RX ORDER — CHLORHEXIDINE GLUCONATE 0.12 MG/ML
15 RINSE ORAL 2 TIMES DAILY
Status: DISCONTINUED | OUTPATIENT
Start: 2021-01-01 | End: 2021-01-01 | Stop reason: HOSPADM

## 2021-01-01 RX ORDER — AZITHROMYCIN 500 MG/1
500 TABLET, FILM COATED ORAL ONCE
Status: COMPLETED | OUTPATIENT
Start: 2021-01-01 | End: 2021-01-01

## 2021-01-01 RX ORDER — SODIUM CHLORIDE 0.9 % (FLUSH) 0.9 %
5-40 SYRINGE (ML) INJECTION PRN
Status: DISCONTINUED | OUTPATIENT
Start: 2021-01-01 | End: 2021-01-01 | Stop reason: HOSPADM

## 2021-01-01 RX ORDER — DEXAMETHASONE 6 MG/1
6 TABLET ORAL
Qty: 7 TABLET | Refills: 0 | Status: SHIPPED | OUTPATIENT
Start: 2021-01-01 | End: 2021-01-01

## 2021-01-01 RX ORDER — LEVOTHYROXINE SODIUM 0.07 MG/1
75 TABLET ORAL DAILY
Status: DISCONTINUED | OUTPATIENT
Start: 2021-01-01 | End: 2021-01-01 | Stop reason: HOSPADM

## 2021-01-01 RX ORDER — SODIUM CHLORIDE 9 MG/ML
10 INJECTION INTRAVENOUS DAILY
Status: DISCONTINUED | OUTPATIENT
Start: 2021-01-01 | End: 2021-01-01 | Stop reason: HOSPADM

## 2021-01-01 RX ORDER — FLUTICASONE FUROATE, UMECLIDINIUM BROMIDE AND VILANTEROL TRIFENATATE 200; 62.5; 25 UG/1; UG/1; UG/1
1 POWDER RESPIRATORY (INHALATION) DAILY
COMMUNITY

## 2021-01-01 RX ORDER — ONDANSETRON 2 MG/ML
4 INJECTION INTRAMUSCULAR; INTRAVENOUS EVERY 6 HOURS PRN
Status: DISCONTINUED | OUTPATIENT
Start: 2021-01-01 | End: 2021-01-01 | Stop reason: HOSPADM

## 2021-01-01 RX ORDER — CISATRACURIUM BESYLATE 2 MG/ML
10 INJECTION, SOLUTION INTRAVENOUS ONCE
Status: DISCONTINUED | OUTPATIENT
Start: 2021-01-01 | End: 2021-01-01 | Stop reason: RX

## 2021-01-01 RX ADMIN — SODIUM BICARBONATE 50 MEQ: 84 INJECTION, SOLUTION INTRAVENOUS at 14:40

## 2021-01-01 RX ADMIN — PROPOFOL 35 MCG/KG/MIN: 10 INJECTION, EMULSION INTRAVENOUS at 02:55

## 2021-01-01 RX ADMIN — SODIUM CHLORIDE, PRESERVATIVE FREE 10 ML: 5 INJECTION INTRAVENOUS at 08:24

## 2021-01-01 RX ADMIN — HYDROXYZINE HYDROCHLORIDE 25 MG: 25 TABLET, FILM COATED ORAL at 15:36

## 2021-01-01 RX ADMIN — FENTANYL CITRATE 125 MCG/HR: 50 INJECTION INTRAVENOUS at 13:02

## 2021-01-01 RX ADMIN — ACETAMINOPHEN 650 MG: 325 TABLET ORAL at 09:08

## 2021-01-01 RX ADMIN — ACETAMINOPHEN 650 MG: 325 TABLET ORAL at 17:45

## 2021-01-01 RX ADMIN — Medication 50 MG: at 09:59

## 2021-01-01 RX ADMIN — EPINEPHRINE 54 MCG/MIN: 1 INJECTION INTRAMUSCULAR; INTRAVENOUS; SUBCUTANEOUS at 11:29

## 2021-01-01 RX ADMIN — EPINEPHRINE 1 MG: 0.1 INJECTION, SOLUTION ENDOTRACHEAL; INTRACARDIAC; INTRAVENOUS at 14:58

## 2021-01-01 RX ADMIN — BUDESONIDE AND FORMOTEROL FUMARATE DIHYDRATE 2 PUFF: 160; 4.5 AEROSOL RESPIRATORY (INHALATION) at 19:25

## 2021-01-01 RX ADMIN — MEROPENEM 1000 MG: 1 INJECTION, POWDER, FOR SOLUTION INTRAVENOUS at 22:00

## 2021-01-01 RX ADMIN — FLUTICASONE PROPIONATE 2 SPRAY: 50 SPRAY, METERED NASAL at 08:57

## 2021-01-01 RX ADMIN — CISATRACURIUM BESYLATE 2.2 MCG/KG/MIN: 10 INJECTION INTRAVENOUS at 00:07

## 2021-01-01 RX ADMIN — PROPOFOL 40 MCG/KG/MIN: 10 INJECTION, EMULSION INTRAVENOUS at 02:39

## 2021-01-01 RX ADMIN — ENOXAPARIN SODIUM 30 MG: 30 INJECTION SUBCUTANEOUS at 08:55

## 2021-01-01 RX ADMIN — HYDROCODONE BITARTRATE AND HOMATROPINE METHYLBROMIDE 5 ML: 5; 1.5 SOLUTION ORAL at 04:08

## 2021-01-01 RX ADMIN — FUROSEMIDE 40 MG: 10 INJECTION, SOLUTION INTRAMUSCULAR; INTRAVENOUS at 13:15

## 2021-01-01 RX ADMIN — DEXAMETHASONE 6 MG: 6 TABLET ORAL at 09:50

## 2021-01-01 RX ADMIN — DOCUSATE SODIUM 100 MG: 50 LIQUID ORAL at 20:31

## 2021-01-01 RX ADMIN — CHLORHEXIDINE GLUCONATE 15 ML: 1.2 RINSE ORAL at 08:21

## 2021-01-01 RX ADMIN — MEROPENEM 1000 MG: 1 INJECTION, POWDER, FOR SOLUTION INTRAVENOUS at 12:02

## 2021-01-01 RX ADMIN — VASOPRESSIN 0.03 UNITS/MIN: 20 INJECTION INTRAVENOUS at 11:14

## 2021-01-01 RX ADMIN — EPINEPHRINE 1 MG: 0.1 INJECTION, SOLUTION ENDOTRACHEAL; INTRACARDIAC; INTRAVENOUS at 15:04

## 2021-01-01 RX ADMIN — DOCUSATE SODIUM 100 MG: 50 LIQUID ORAL at 20:23

## 2021-01-01 RX ADMIN — CISATRACURIUM BESYLATE 6 MCG/KG/MIN: 2 INJECTION INTRAVENOUS at 11:41

## 2021-01-01 RX ADMIN — Medication 10 ML: at 08:26

## 2021-01-01 RX ADMIN — METOCLOPRAMIDE 10 MG: 5 INJECTION, SOLUTION INTRAMUSCULAR; INTRAVENOUS at 10:44

## 2021-01-01 RX ADMIN — ENOXAPARIN SODIUM 30 MG: 30 INJECTION SUBCUTANEOUS at 19:33

## 2021-01-01 RX ADMIN — CHLORHEXIDINE GLUCONATE 15 ML: 1.2 RINSE ORAL at 20:25

## 2021-01-01 RX ADMIN — REMDESIVIR 100 MG: 5 INJECTION INTRAVENOUS at 22:44

## 2021-01-01 RX ADMIN — FENTANYL CITRATE 200 MCG/HR: 50 INJECTION INTRAVENOUS at 02:42

## 2021-01-01 RX ADMIN — Medication 10 ML: at 10:08

## 2021-01-01 RX ADMIN — DEXAMETHASONE 6 MG: 6 TABLET ORAL at 09:00

## 2021-01-01 RX ADMIN — PROPOFOL 45 MCG/KG/MIN: 10 INJECTION, EMULSION INTRAVENOUS at 23:59

## 2021-01-01 RX ADMIN — METOCLOPRAMIDE 10 MG: 5 INJECTION, SOLUTION INTRAMUSCULAR; INTRAVENOUS at 02:00

## 2021-01-01 RX ADMIN — Medication 50 MG: at 08:11

## 2021-01-01 RX ADMIN — Medication 100 MEQ: at 18:50

## 2021-01-01 RX ADMIN — PROPOFOL 40 MCG/KG/MIN: 10 INJECTION, EMULSION INTRAVENOUS at 11:30

## 2021-01-01 RX ADMIN — HYDROXYZINE HYDROCHLORIDE 25 MG: 25 TABLET, FILM COATED ORAL at 08:15

## 2021-01-01 RX ADMIN — DOCUSATE SODIUM 100 MG: 50 LIQUID ORAL at 09:00

## 2021-01-01 RX ADMIN — SODIUM CHLORIDE 4.32 UNITS/HR: 9 INJECTION, SOLUTION INTRAVENOUS at 10:11

## 2021-01-01 RX ADMIN — PROPOFOL 40 MCG/KG/MIN: 10 INJECTION, EMULSION INTRAVENOUS at 22:59

## 2021-01-01 RX ADMIN — ROCURONIUM BROMIDE 8 MCG/KG/MIN: 10 INJECTION INTRAVENOUS at 12:26

## 2021-01-01 RX ADMIN — LEVOTHYROXINE SODIUM 75 MCG: 0.07 TABLET ORAL at 09:09

## 2021-01-01 RX ADMIN — HEPARIN SODIUM AND DEXTROSE 16 UNITS/KG/HR: 10000; 5 INJECTION INTRAVENOUS at 08:25

## 2021-01-01 RX ADMIN — ACETAMINOPHEN 650 MG: 650 SUPPOSITORY RECTAL at 15:24

## 2021-01-01 RX ADMIN — PROPOFOL 45 MCG/KG/MIN: 10 INJECTION, EMULSION INTRAVENOUS at 21:01

## 2021-01-01 RX ADMIN — LEVOTHYROXINE SODIUM 75 MCG: 0.07 TABLET ORAL at 06:18

## 2021-01-01 RX ADMIN — FENTANYL CITRATE 150 MCG/HR: 50 INJECTION INTRAVENOUS at 17:29

## 2021-01-01 RX ADMIN — HYDROCODONE BITARTRATE AND HOMATROPINE METHYLBROMIDE 5 ML: 5; 1.5 SOLUTION ORAL at 12:45

## 2021-01-01 RX ADMIN — FLUTICASONE PROPIONATE 2 SPRAY: 50 SPRAY, METERED NASAL at 20:19

## 2021-01-01 RX ADMIN — PANTOPRAZOLE SODIUM 40 MG: 40 INJECTION, POWDER, FOR SOLUTION INTRAVENOUS at 10:06

## 2021-01-01 RX ADMIN — HYDROXYZINE HYDROCHLORIDE 25 MG: 25 TABLET, FILM COATED ORAL at 20:52

## 2021-01-01 RX ADMIN — FENTANYL CITRATE 125 MCG/HR: 50 INJECTION INTRAVENOUS at 00:13

## 2021-01-01 RX ADMIN — SODIUM BICARBONATE 50 MEQ: 84 INJECTION, SOLUTION INTRAVENOUS at 11:01

## 2021-01-01 RX ADMIN — PROPOFOL 40 MCG/KG/MIN: 10 INJECTION, EMULSION INTRAVENOUS at 03:15

## 2021-01-01 RX ADMIN — SODIUM CHLORIDE, POTASSIUM CHLORIDE, SODIUM LACTATE AND CALCIUM CHLORIDE: 600; 310; 30; 20 INJECTION, SOLUTION INTRAVENOUS at 23:12

## 2021-01-01 RX ADMIN — ACETAMINOPHEN 650 MG: 650 SUPPOSITORY RECTAL at 06:44

## 2021-01-01 RX ADMIN — LABETALOL HYDROCHLORIDE 10 MG: 5 INJECTION, SOLUTION INTRAVENOUS at 23:03

## 2021-01-01 RX ADMIN — PROPOFOL 40 MCG/KG/MIN: 10 INJECTION, EMULSION INTRAVENOUS at 16:28

## 2021-01-01 RX ADMIN — PROPOFOL 45 MCG/KG/MIN: 10 INJECTION, EMULSION INTRAVENOUS at 05:24

## 2021-01-01 RX ADMIN — INSULIN HUMAN 10 UNITS: 100 INJECTION, SOLUTION PARENTERAL at 16:45

## 2021-01-01 RX ADMIN — EPINEPHRINE 50 MCG/MIN: 1 INJECTION INTRAMUSCULAR; INTRAVENOUS; SUBCUTANEOUS at 06:58

## 2021-01-01 RX ADMIN — Medication 100 MCG/MIN: at 07:00

## 2021-01-01 RX ADMIN — FENTANYL CITRATE 50 MCG: 50 INJECTION INTRAMUSCULAR; INTRAVENOUS at 17:23

## 2021-01-01 RX ADMIN — ENOXAPARIN SODIUM 30 MG: 30 INJECTION SUBCUTANEOUS at 08:11

## 2021-01-01 RX ADMIN — ACETAMINOPHEN 650 MG: 325 TABLET ORAL at 09:50

## 2021-01-01 RX ADMIN — ENOXAPARIN SODIUM 30 MG: 30 INJECTION SUBCUTANEOUS at 08:49

## 2021-01-01 RX ADMIN — PANTOPRAZOLE SODIUM 40 MG: 40 INJECTION, POWDER, FOR SOLUTION INTRAVENOUS at 12:04

## 2021-01-01 RX ADMIN — FENTANYL CITRATE 100 MCG/HR: 50 INJECTION INTRAVENOUS at 13:34

## 2021-01-01 RX ADMIN — CALCIUM CHLORIDE 1000 MG: 100 INJECTION, SOLUTION INTRAVENOUS at 09:58

## 2021-01-01 RX ADMIN — VANCOMYCIN HYDROCHLORIDE 1000 MG: 1 INJECTION, POWDER, LYOPHILIZED, FOR SOLUTION INTRAVENOUS at 12:41

## 2021-01-01 RX ADMIN — SODIUM BICARBONATE: 84 INJECTION, SOLUTION INTRAVENOUS at 20:00

## 2021-01-01 RX ADMIN — FUROSEMIDE 40 MG: 10 INJECTION, SOLUTION INTRAMUSCULAR; INTRAVENOUS at 17:41

## 2021-01-01 RX ADMIN — REMDESIVIR 100 MG: 5 INJECTION INTRAVENOUS at 21:02

## 2021-01-01 RX ADMIN — ACETAMINOPHEN 650 MG: 650 SUPPOSITORY RECTAL at 22:24

## 2021-01-01 RX ADMIN — LEVOTHYROXINE SODIUM 75 MCG: 0.07 TABLET ORAL at 06:35

## 2021-01-01 RX ADMIN — Medication 50 MG: at 08:57

## 2021-01-01 RX ADMIN — LEVOTHYROXINE SODIUM 75 MCG: 0.07 TABLET ORAL at 05:55

## 2021-01-01 RX ADMIN — MEROPENEM 1000 MG: 1 INJECTION, POWDER, FOR SOLUTION INTRAVENOUS at 11:39

## 2021-01-01 RX ADMIN — TIOTROPIUM BROMIDE INHALATION SPRAY 2 PUFF: 3.12 SPRAY, METERED RESPIRATORY (INHALATION) at 07:24

## 2021-01-01 RX ADMIN — SODIUM CHLORIDE, PRESERVATIVE FREE 10 ML: 5 INJECTION INTRAVENOUS at 12:04

## 2021-01-01 RX ADMIN — PANTOPRAZOLE SODIUM 40 MG: 40 INJECTION, POWDER, FOR SOLUTION INTRAVENOUS at 09:00

## 2021-01-01 RX ADMIN — PROPOFOL 40 MCG/KG/MIN: 10 INJECTION, EMULSION INTRAVENOUS at 19:47

## 2021-01-01 RX ADMIN — Medication 10 ML: at 20:24

## 2021-01-01 RX ADMIN — HYDROCODONE BITARTRATE AND HOMATROPINE METHYLBROMIDE 5 ML: 5; 1.5 SOLUTION ORAL at 02:56

## 2021-01-01 RX ADMIN — PROPOFOL 40 MCG/KG/MIN: 10 INJECTION, EMULSION INTRAVENOUS at 07:33

## 2021-01-01 RX ADMIN — Medication 50 MG: at 12:05

## 2021-01-01 RX ADMIN — PROPOFOL 45 MCG/KG/MIN: 10 INJECTION, EMULSION INTRAVENOUS at 14:03

## 2021-01-01 RX ADMIN — PROPOFOL 40 MCG/KG/MIN: 10 INJECTION, EMULSION INTRAVENOUS at 20:23

## 2021-01-01 RX ADMIN — REMDESIVIR 100 MG: 5 INJECTION INTRAVENOUS at 20:26

## 2021-01-01 RX ADMIN — MAGNESIUM SULFATE HEPTAHYDRATE 2000 MG: 40 INJECTION, SOLUTION INTRAVENOUS at 14:08

## 2021-01-01 RX ADMIN — PROPOFOL 40 MCG/KG/MIN: 10 INJECTION, EMULSION INTRAVENOUS at 05:49

## 2021-01-01 RX ADMIN — ONDANSETRON 4 MG: 2 INJECTION INTRAMUSCULAR; INTRAVENOUS at 09:34

## 2021-01-01 RX ADMIN — CALCIUM CHLORIDE 1000 MG: 100 INJECTION INTRAVENOUS; INTRAVENTRICULAR at 14:48

## 2021-01-01 RX ADMIN — CISATRACURIUM BESYLATE 1 MCG/KG/MIN: 10 INJECTION INTRAVENOUS at 18:54

## 2021-01-01 RX ADMIN — SODIUM CHLORIDE 12.8 UNITS/HR: 9 INJECTION, SOLUTION INTRAVENOUS at 15:25

## 2021-01-01 RX ADMIN — FENTANYL CITRATE 125 MCG/HR: 50 INJECTION INTRAVENOUS at 05:30

## 2021-01-01 RX ADMIN — PROPOFOL 40 MCG/KG/MIN: 10 INJECTION, EMULSION INTRAVENOUS at 16:26

## 2021-01-01 RX ADMIN — FENTANYL CITRATE 125 MCG/HR: 50 INJECTION INTRAVENOUS at 09:00

## 2021-01-01 RX ADMIN — ACETAMINOPHEN 650 MG: 325 TABLET ORAL at 14:33

## 2021-01-01 RX ADMIN — POLYETHYLENE GLYCOL 3350 17 G: 17 POWDER, FOR SOLUTION ORAL at 08:19

## 2021-01-01 RX ADMIN — ONDANSETRON 4 MG: 2 INJECTION INTRAMUSCULAR; INTRAVENOUS at 14:08

## 2021-01-01 RX ADMIN — SODIUM CHLORIDE 15.2 UNITS/HR: 9 INJECTION, SOLUTION INTRAVENOUS at 08:36

## 2021-01-01 RX ADMIN — HEPARIN SODIUM AND DEXTROSE 13 UNITS/KG/HR: 10000; 5 INJECTION INTRAVENOUS at 04:50

## 2021-01-01 RX ADMIN — ENOXAPARIN SODIUM 30 MG: 30 INJECTION SUBCUTANEOUS at 20:05

## 2021-01-01 RX ADMIN — Medication 70 MCG/MIN: at 17:41

## 2021-01-01 RX ADMIN — DEXTROSE MONOHYDRATE 25 G: 25 INJECTION, SOLUTION INTRAVENOUS at 18:50

## 2021-01-01 RX ADMIN — PANTOPRAZOLE SODIUM 40 MG: 40 INJECTION, POWDER, FOR SOLUTION INTRAVENOUS at 08:12

## 2021-01-01 RX ADMIN — SODIUM CHLORIDE: 9 INJECTION, SOLUTION INTRAVENOUS at 20:18

## 2021-01-01 RX ADMIN — MEROPENEM 1000 MG: 1 INJECTION, POWDER, FOR SOLUTION INTRAVENOUS at 04:10

## 2021-01-01 RX ADMIN — FENTANYL CITRATE 125 MCG/HR: 50 INJECTION INTRAVENOUS at 05:58

## 2021-01-01 RX ADMIN — LEVOFLOXACIN 500 MG: 5 INJECTION, SOLUTION INTRAVENOUS at 13:15

## 2021-01-01 RX ADMIN — HYDROXYZINE HYDROCHLORIDE 25 MG: 25 TABLET, FILM COATED ORAL at 02:56

## 2021-01-01 RX ADMIN — SODIUM CHLORIDE: 9 INJECTION, SOLUTION INTRAVENOUS at 02:50

## 2021-01-01 RX ADMIN — CHLORHEXIDINE GLUCONATE 15 ML: 1.2 RINSE ORAL at 10:06

## 2021-01-01 RX ADMIN — SODIUM CHLORIDE 20.79 UNITS/HR: 9 INJECTION, SOLUTION INTRAVENOUS at 02:13

## 2021-01-01 RX ADMIN — ALBUMIN (HUMAN) 25 G: 12.5 INJECTION, SOLUTION INTRAVENOUS at 18:58

## 2021-01-01 RX ADMIN — HYDROXYZINE HYDROCHLORIDE 25 MG: 25 TABLET, FILM COATED ORAL at 07:44

## 2021-01-01 RX ADMIN — DOCUSATE SODIUM 100 MG: 50 LIQUID ORAL at 09:21

## 2021-01-01 RX ADMIN — ENOXAPARIN SODIUM 30 MG: 30 INJECTION SUBCUTANEOUS at 21:52

## 2021-01-01 RX ADMIN — Medication 10 ML: at 19:48

## 2021-01-01 RX ADMIN — CISATRACURIUM BESYLATE 6 MCG/KG/MIN: 10 INJECTION INTRAVENOUS at 09:57

## 2021-01-01 RX ADMIN — SODIUM BICARBONATE 50 MEQ: 84 INJECTION, SOLUTION INTRAVENOUS at 10:59

## 2021-01-01 RX ADMIN — HYDROCODONE BITARTRATE AND HOMATROPINE METHYLBROMIDE 5 ML: 5; 1.5 SOLUTION ORAL at 01:07

## 2021-01-01 RX ADMIN — REMDESIVIR 100 MG: 5 INJECTION INTRAVENOUS at 21:22

## 2021-01-01 RX ADMIN — ENOXAPARIN SODIUM 30 MG: 30 INJECTION SUBCUTANEOUS at 20:50

## 2021-01-01 RX ADMIN — HYDROCODONE BITARTRATE AND HOMATROPINE METHYLBROMIDE 5 ML: 5; 1.5 SOLUTION ORAL at 17:45

## 2021-01-01 RX ADMIN — BENZONATATE 200 MG: 100 CAPSULE ORAL at 11:16

## 2021-01-01 RX ADMIN — LEVOTHYROXINE SODIUM 75 MCG: 0.07 TABLET ORAL at 05:59

## 2021-01-01 RX ADMIN — Medication 50 MG: at 14:02

## 2021-01-01 RX ADMIN — HYDROCODONE BITARTRATE AND HOMATROPINE METHYLBROMIDE 5 ML: 5; 1.5 SOLUTION ORAL at 09:50

## 2021-01-01 RX ADMIN — ENOXAPARIN SODIUM 30 MG: 30 INJECTION SUBCUTANEOUS at 20:30

## 2021-01-01 RX ADMIN — PROPOFOL 40 MCG/KG/MIN: 10 INJECTION, EMULSION INTRAVENOUS at 17:41

## 2021-01-01 RX ADMIN — FUROSEMIDE 40 MG: 10 INJECTION, SOLUTION INTRAMUSCULAR; INTRAVENOUS at 08:20

## 2021-01-01 RX ADMIN — VANCOMYCIN HYDROCHLORIDE 1250 MG: 5 INJECTION, POWDER, LYOPHILIZED, FOR SOLUTION INTRAVENOUS at 23:49

## 2021-01-01 RX ADMIN — ENOXAPARIN SODIUM 30 MG: 30 INJECTION SUBCUTANEOUS at 09:00

## 2021-01-01 RX ADMIN — FUROSEMIDE 40 MG: 10 INJECTION, SOLUTION INTRAMUSCULAR; INTRAVENOUS at 19:47

## 2021-01-01 RX ADMIN — LABETALOL HYDROCHLORIDE 10 MG: 5 INJECTION, SOLUTION INTRAVENOUS at 22:23

## 2021-01-01 RX ADMIN — DAPTOMYCIN 320 MG: 500 INJECTION, POWDER, LYOPHILIZED, FOR SOLUTION INTRAVENOUS at 18:42

## 2021-01-01 RX ADMIN — ENOXAPARIN SODIUM 30 MG: 30 INJECTION SUBCUTANEOUS at 20:21

## 2021-01-01 RX ADMIN — PROPOFOL 35 MCG/KG/MIN: 10 INJECTION, EMULSION INTRAVENOUS at 09:19

## 2021-01-01 RX ADMIN — DEXAMETHASONE 6 MG: 6 TABLET ORAL at 09:21

## 2021-01-01 RX ADMIN — TRAZODONE HYDROCHLORIDE 50 MG: 50 TABLET ORAL at 21:04

## 2021-01-01 RX ADMIN — ENOXAPARIN SODIUM 30 MG: 30 INJECTION SUBCUTANEOUS at 21:48

## 2021-01-01 RX ADMIN — SODIUM BICARBONATE 50 MEQ: 84 INJECTION, SOLUTION INTRAVENOUS at 14:43

## 2021-01-01 RX ADMIN — Medication 100 MCG/MIN: at 02:43

## 2021-01-01 RX ADMIN — FENTANYL CITRATE 125 MCG/HR: 50 INJECTION INTRAVENOUS at 17:03

## 2021-01-01 RX ADMIN — SODIUM ZIRCONIUM CYCLOSILICATE 10 G: 10 POWDER, FOR SUSPENSION ORAL at 20:24

## 2021-01-01 RX ADMIN — ENOXAPARIN SODIUM 30 MG: 30 INJECTION SUBCUTANEOUS at 10:34

## 2021-01-01 RX ADMIN — HYDROCODONE BITARTRATE AND HOMATROPINE METHYLBROMIDE 5 ML: 5; 1.5 SOLUTION ORAL at 09:25

## 2021-01-01 RX ADMIN — MEROPENEM 1000 MG: 1 INJECTION, POWDER, FOR SOLUTION INTRAVENOUS at 05:49

## 2021-01-01 RX ADMIN — HYDROCODONE BITARTRATE AND HOMATROPINE METHYLBROMIDE 5 ML: 5; 1.5 SOLUTION ORAL at 02:26

## 2021-01-01 RX ADMIN — SODIUM CHLORIDE 14.52 UNITS/HR: 9 INJECTION, SOLUTION INTRAVENOUS at 12:00

## 2021-01-01 RX ADMIN — DOCUSATE SODIUM 100 MG: 50 LIQUID ORAL at 20:25

## 2021-01-01 RX ADMIN — BARICITINIB 4 MG: 2 TABLET, FILM COATED ORAL at 09:07

## 2021-01-01 RX ADMIN — TRAZODONE HYDROCHLORIDE 50 MG: 50 TABLET ORAL at 20:33

## 2021-01-01 RX ADMIN — SODIUM BICARBONATE: 84 INJECTION, SOLUTION INTRAVENOUS at 07:34

## 2021-01-01 RX ADMIN — Medication 10 ML: at 07:59

## 2021-01-01 RX ADMIN — HYDROCODONE BITARTRATE AND HOMATROPINE METHYLBROMIDE 5 ML: 5; 1.5 SOLUTION ORAL at 02:24

## 2021-01-01 RX ADMIN — Medication 50 MG: at 09:02

## 2021-01-01 RX ADMIN — HYDROXYZINE HYDROCHLORIDE 25 MG: 25 TABLET, FILM COATED ORAL at 14:32

## 2021-01-01 RX ADMIN — HYDROXYZINE HYDROCHLORIDE 25 MG: 25 TABLET, FILM COATED ORAL at 19:33

## 2021-01-01 RX ADMIN — ENOXAPARIN SODIUM 30 MG: 30 INJECTION SUBCUTANEOUS at 09:09

## 2021-01-01 RX ADMIN — FUROSEMIDE 10 MG/HR: 10 INJECTION, SOLUTION INTRAMUSCULAR; INTRAVENOUS at 08:56

## 2021-01-01 RX ADMIN — SODIUM CHLORIDE, PRESERVATIVE FREE 10 ML: 5 INJECTION INTRAVENOUS at 09:01

## 2021-01-01 RX ADMIN — FENTANYL CITRATE 150 MCG/HR: 50 INJECTION INTRAVENOUS at 22:44

## 2021-01-01 RX ADMIN — METOCLOPRAMIDE 10 MG: 5 INJECTION, SOLUTION INTRAMUSCULAR; INTRAVENOUS at 10:05

## 2021-01-01 RX ADMIN — LABETALOL HYDROCHLORIDE 10 MG: 5 INJECTION, SOLUTION INTRAVENOUS at 06:49

## 2021-01-01 RX ADMIN — SODIUM CHLORIDE, POTASSIUM CHLORIDE, SODIUM LACTATE AND CALCIUM CHLORIDE: 600; 310; 30; 20 INJECTION, SOLUTION INTRAVENOUS at 11:48

## 2021-01-01 RX ADMIN — ROCURONIUM BROMIDE 8 MCG/KG/MIN: 10 INJECTION INTRAVENOUS at 04:45

## 2021-01-01 RX ADMIN — HYDROXYZINE HYDROCHLORIDE 25 MG: 25 TABLET, FILM COATED ORAL at 22:44

## 2021-01-01 RX ADMIN — INSULIN HUMAN 10 UNITS: 100 INJECTION, SOLUTION PARENTERAL at 09:56

## 2021-01-01 RX ADMIN — CHLORHEXIDINE GLUCONATE 15 ML: 1.2 RINSE ORAL at 09:20

## 2021-01-01 RX ADMIN — DOCUSATE SODIUM 100 MG: 50 LIQUID ORAL at 21:04

## 2021-01-01 RX ADMIN — MEROPENEM 1000 MG: 1 INJECTION, POWDER, FOR SOLUTION INTRAVENOUS at 13:05

## 2021-01-01 RX ADMIN — HYDROCODONE BITARTRATE AND HOMATROPINE METHYLBROMIDE 5 ML: 5; 1.5 SOLUTION ORAL at 20:22

## 2021-01-01 RX ADMIN — SODIUM BICARBONATE: 84 INJECTION, SOLUTION INTRAVENOUS at 10:29

## 2021-01-01 RX ADMIN — LACTULOSE 20 G: 20 SOLUTION ORAL at 10:05

## 2021-01-01 RX ADMIN — REMDESIVIR 100 MG: 5 INJECTION INTRAVENOUS at 21:48

## 2021-01-01 RX ADMIN — DEXTROSE MONOHYDRATE 200 MG: 50 INJECTION, SOLUTION INTRAVENOUS at 10:59

## 2021-01-01 RX ADMIN — HYDROCODONE BITARTRATE AND HOMATROPINE METHYLBROMIDE 5 ML: 5; 1.5 SOLUTION ORAL at 13:25

## 2021-01-01 RX ADMIN — FENTANYL CITRATE 125 MCG/HR: 50 INJECTION INTRAVENOUS at 14:23

## 2021-01-01 RX ADMIN — CISATRACURIUM BESYLATE 2.2 MCG/KG/MIN: 10 INJECTION INTRAVENOUS at 13:03

## 2021-01-01 RX ADMIN — DEXAMETHASONE 6 MG: 6 TABLET ORAL at 10:34

## 2021-01-01 RX ADMIN — CHLORHEXIDINE GLUCONATE 15 ML: 1.2 RINSE ORAL at 20:23

## 2021-01-01 RX ADMIN — Medication 10 ML: at 09:22

## 2021-01-01 RX ADMIN — Medication 10 ML: at 20:20

## 2021-01-01 RX ADMIN — ETOMIDATE 40 MG: 20 INJECTION, SOLUTION INTRAVENOUS at 17:22

## 2021-01-01 RX ADMIN — FUROSEMIDE 20 MG: 10 INJECTION, SOLUTION INTRAMUSCULAR; INTRAVENOUS at 10:46

## 2021-01-01 RX ADMIN — HYDROXYZINE HYDROCHLORIDE 25 MG: 25 TABLET, FILM COATED ORAL at 01:07

## 2021-01-01 RX ADMIN — HYDROCODONE BITARTRATE AND HOMATROPINE METHYLBROMIDE 5 ML: 5; 1.5 SOLUTION ORAL at 20:33

## 2021-01-01 RX ADMIN — EPINEPHRINE 0.1 MG: 0.1 INJECTION, SOLUTION ENDOTRACHEAL; INTRACARDIAC; INTRAVENOUS at 14:45

## 2021-01-01 RX ADMIN — PROPOFOL 40 MCG/KG/MIN: 10 INJECTION, EMULSION INTRAVENOUS at 13:15

## 2021-01-01 RX ADMIN — DOCUSATE SODIUM 100 MG: 50 LIQUID ORAL at 10:05

## 2021-01-01 RX ADMIN — LEVOTHYROXINE SODIUM 75 MCG: 0.07 TABLET ORAL at 05:24

## 2021-01-01 RX ADMIN — PROPOFOL 40 MCG/KG/MIN: 10 INJECTION, EMULSION INTRAVENOUS at 06:49

## 2021-01-01 RX ADMIN — HYDROXYZINE HYDROCHLORIDE 25 MG: 25 TABLET, FILM COATED ORAL at 09:19

## 2021-01-01 RX ADMIN — Medication 10 ML: at 20:26

## 2021-01-01 RX ADMIN — BARICITINIB 4 MG: 2 TABLET, FILM COATED ORAL at 14:32

## 2021-01-01 RX ADMIN — DOCUSATE SODIUM 100 MG: 50 LIQUID ORAL at 08:25

## 2021-01-01 RX ADMIN — MEROPENEM 1000 MG: 1 INJECTION, POWDER, FOR SOLUTION INTRAVENOUS at 06:00

## 2021-01-01 RX ADMIN — CISATRACURIUM BESYLATE 3.5 MCG/KG/MIN: 10 INJECTION INTRAVENOUS at 12:14

## 2021-01-01 RX ADMIN — SODIUM CHLORIDE 13 UNITS/HR: 9 INJECTION, SOLUTION INTRAVENOUS at 08:32

## 2021-01-01 RX ADMIN — CISATRACURIUM BESYLATE 2.2 MCG/KG/MIN: 10 INJECTION INTRAVENOUS at 23:45

## 2021-01-01 RX ADMIN — SODIUM ZIRCONIUM CYCLOSILICATE 10 G: 10 POWDER, FOR SUSPENSION ORAL at 16:44

## 2021-01-01 RX ADMIN — Medication 10 ML: at 10:09

## 2021-01-01 RX ADMIN — BUDESONIDE AND FORMOTEROL FUMARATE DIHYDRATE 2 PUFF: 160; 4.5 AEROSOL RESPIRATORY (INHALATION) at 19:20

## 2021-01-01 RX ADMIN — Medication 10 ML: at 22:44

## 2021-01-01 RX ADMIN — ACETAMINOPHEN 650 MG: 650 SUPPOSITORY RECTAL at 14:53

## 2021-01-01 RX ADMIN — FLUTICASONE PROPIONATE 2 SPRAY: 50 SPRAY, METERED NASAL at 08:50

## 2021-01-01 RX ADMIN — FENTANYL CITRATE 125 MCG/HR: 50 INJECTION INTRAVENOUS at 00:06

## 2021-01-01 RX ADMIN — ENOXAPARIN SODIUM 30 MG: 30 INJECTION SUBCUTANEOUS at 07:44

## 2021-01-01 RX ADMIN — DEXAMETHASONE 6 MG: 6 TABLET ORAL at 07:44

## 2021-01-01 RX ADMIN — EPINEPHRINE 1 MG: 0.1 INJECTION, SOLUTION ENDOTRACHEAL; INTRACARDIAC; INTRAVENOUS at 15:01

## 2021-01-01 RX ADMIN — Medication 5 MCG/MIN: at 08:02

## 2021-01-01 RX ADMIN — ENOXAPARIN SODIUM 30 MG: 30 INJECTION SUBCUTANEOUS at 09:25

## 2021-01-01 RX ADMIN — PROPOFOL 40 MCG/KG/MIN: 10 INJECTION, EMULSION INTRAVENOUS at 13:34

## 2021-01-01 RX ADMIN — AZITHROMYCIN MONOHYDRATE 500 MG: 500 TABLET ORAL at 17:20

## 2021-01-01 RX ADMIN — ONDANSETRON 4 MG: 2 INJECTION INTRAMUSCULAR; INTRAVENOUS at 13:56

## 2021-01-01 RX ADMIN — HYDROCODONE BITARTRATE AND HOMATROPINE METHYLBROMIDE 5 ML: 5; 1.5 SOLUTION ORAL at 20:58

## 2021-01-01 RX ADMIN — CHLORHEXIDINE GLUCONATE 15 ML: 1.2 RINSE ORAL at 08:55

## 2021-01-01 RX ADMIN — DEXTROSE MONOHYDRATE 25 G: 25 INJECTION, SOLUTION INTRAVENOUS at 09:55

## 2021-01-01 RX ADMIN — EPINEPHRINE 1 MG: 0.1 INJECTION, SOLUTION ENDOTRACHEAL; INTRACARDIAC; INTRAVENOUS at 14:41

## 2021-01-01 RX ADMIN — IOPAMIDOL 100 ML: 755 INJECTION, SOLUTION INTRAVENOUS at 11:55

## 2021-01-01 RX ADMIN — ENOXAPARIN SODIUM 30 MG: 30 INJECTION SUBCUTANEOUS at 08:24

## 2021-01-01 RX ADMIN — SODIUM CHLORIDE, POTASSIUM CHLORIDE, SODIUM LACTATE AND CALCIUM CHLORIDE: 600; 310; 30; 20 INJECTION, SOLUTION INTRAVENOUS at 01:24

## 2021-01-01 RX ADMIN — PANTOPRAZOLE SODIUM 40 MG: 40 INJECTION, POWDER, FOR SOLUTION INTRAVENOUS at 08:24

## 2021-01-01 RX ADMIN — PANTOPRAZOLE SODIUM 40 MG: 40 INJECTION, POWDER, FOR SOLUTION INTRAVENOUS at 08:20

## 2021-01-01 RX ADMIN — Medication 10 ML: at 21:48

## 2021-01-01 RX ADMIN — HYDROCODONE BITARTRATE AND HOMATROPINE METHYLBROMIDE 5 ML: 5; 1.5 SOLUTION ORAL at 01:24

## 2021-01-01 RX ADMIN — ENOXAPARIN SODIUM 30 MG: 30 INJECTION SUBCUTANEOUS at 20:26

## 2021-01-01 RX ADMIN — Medication 10 ML: at 00:08

## 2021-01-01 RX ADMIN — DOCUSATE SODIUM 100 MG: 50 LIQUID ORAL at 19:47

## 2021-01-01 RX ADMIN — BUDESONIDE AND FORMOTEROL FUMARATE DIHYDRATE 2 PUFF: 160; 4.5 AEROSOL RESPIRATORY (INHALATION) at 19:37

## 2021-01-01 RX ADMIN — FENTANYL CITRATE 150 MCG/HR: 50 INJECTION INTRAVENOUS at 05:21

## 2021-01-01 RX ADMIN — PROPOFOL 40 MCG/KG/MIN: 10 INJECTION, EMULSION INTRAVENOUS at 08:59

## 2021-01-01 RX ADMIN — Medication 10 ML: at 20:06

## 2021-01-01 RX ADMIN — ENOXAPARIN SODIUM 30 MG: 30 INJECTION SUBCUTANEOUS at 09:21

## 2021-01-01 RX ADMIN — BUDESONIDE AND FORMOTEROL FUMARATE DIHYDRATE 2 PUFF: 160; 4.5 AEROSOL RESPIRATORY (INHALATION) at 08:25

## 2021-01-01 RX ADMIN — LEVOTHYROXINE SODIUM 75 MCG: 0.07 TABLET ORAL at 10:05

## 2021-01-01 RX ADMIN — SODIUM CHLORIDE, PRESERVATIVE FREE 10 ML: 5 INJECTION INTRAVENOUS at 09:22

## 2021-01-01 RX ADMIN — SODIUM BICARBONATE: 84 INJECTION, SOLUTION INTRAVENOUS at 06:42

## 2021-01-01 RX ADMIN — PROPOFOL 30 MCG/KG/MIN: 10 INJECTION, EMULSION INTRAVENOUS at 07:00

## 2021-01-01 RX ADMIN — CISATRACURIUM BESYLATE 2 MCG/KG/MIN: 10 INJECTION INTRAVENOUS at 13:17

## 2021-01-01 RX ADMIN — EPINEPHRINE 5 MCG/MIN: 1 INJECTION INTRAMUSCULAR; INTRAVENOUS; SUBCUTANEOUS at 18:51

## 2021-01-01 RX ADMIN — PROPOFOL 45 MCG/KG/MIN: 10 INJECTION, EMULSION INTRAVENOUS at 03:08

## 2021-01-01 RX ADMIN — ONDANSETRON 4 MG: 2 INJECTION INTRAMUSCULAR; INTRAVENOUS at 07:44

## 2021-01-01 RX ADMIN — MEROPENEM 1000 MG: 1 INJECTION, POWDER, FOR SOLUTION INTRAVENOUS at 20:23

## 2021-01-01 RX ADMIN — BUDESONIDE AND FORMOTEROL FUMARATE DIHYDRATE 2 PUFF: 160; 4.5 AEROSOL RESPIRATORY (INHALATION) at 20:44

## 2021-01-01 RX ADMIN — HYDROCODONE BITARTRATE AND HOMATROPINE METHYLBROMIDE 5 ML: 5; 1.5 SOLUTION ORAL at 19:32

## 2021-01-01 RX ADMIN — TOCILIZUMAB 648 MG: 180 INJECTION, SOLUTION SUBCUTANEOUS at 20:08

## 2021-01-01 RX ADMIN — CALCIUM GLUCONATE 1000 MG: 98 INJECTION, SOLUTION INTRAVENOUS at 20:54

## 2021-01-01 RX ADMIN — CISATRACURIUM BESYLATE 6.5 MCG/KG/MIN: 10 INJECTION INTRAVENOUS at 21:22

## 2021-01-01 RX ADMIN — PROPOFOL 40 MCG/KG/MIN: 10 INJECTION, EMULSION INTRAVENOUS at 20:25

## 2021-01-01 RX ADMIN — SODIUM CHLORIDE, PRESERVATIVE FREE 10 ML: 5 INJECTION INTRAVENOUS at 08:12

## 2021-01-01 RX ADMIN — FLUCONAZOLE, SODIUM CHLORIDE 400 MG: 2 INJECTION INTRAVENOUS at 10:46

## 2021-01-01 RX ADMIN — CHLORHEXIDINE GLUCONATE 15 ML: 1.2 RINSE ORAL at 08:00

## 2021-01-01 RX ADMIN — DEXAMETHASONE 6 MG: 6 TABLET ORAL at 09:19

## 2021-01-01 RX ADMIN — PROPOFOL 31.22 MCG/KG/MIN: 10 INJECTION, EMULSION INTRAVENOUS at 10:12

## 2021-01-01 RX ADMIN — ENOXAPARIN SODIUM 30 MG: 30 INJECTION SUBCUTANEOUS at 20:58

## 2021-01-01 RX ADMIN — FENTANYL CITRATE 125 MCG/HR: 50 INJECTION INTRAVENOUS at 16:29

## 2021-01-01 RX ADMIN — SODIUM ZIRCONIUM CYCLOSILICATE 10 G: 10 POWDER, FOR SUSPENSION ORAL at 10:07

## 2021-01-01 RX ADMIN — ROCURONIUM BROMIDE 8 MCG/KG/MIN: 10 INJECTION INTRAVENOUS at 02:41

## 2021-01-01 RX ADMIN — CISATRACURIUM BESYLATE 4.5 MCG/KG/MIN: 10 INJECTION INTRAVENOUS at 00:13

## 2021-01-01 RX ADMIN — CHLORHEXIDINE GLUCONATE 15 ML: 1.2 RINSE ORAL at 21:04

## 2021-01-01 RX ADMIN — TRAZODONE HYDROCHLORIDE 50 MG: 50 TABLET ORAL at 20:06

## 2021-01-01 RX ADMIN — POLYETHYLENE GLYCOL 3350 17 G: 17 POWDER, FOR SOLUTION ORAL at 10:06

## 2021-01-01 RX ADMIN — MEROPENEM 1000 MG: 1 INJECTION, POWDER, FOR SOLUTION INTRAVENOUS at 14:11

## 2021-01-01 RX ADMIN — HYDROCODONE BITARTRATE AND HOMATROPINE METHYLBROMIDE 5 ML: 5; 1.5 SOLUTION ORAL at 08:16

## 2021-01-01 RX ADMIN — REMDESIVIR 100 MG: 5 INJECTION INTRAVENOUS at 21:00

## 2021-01-01 RX ADMIN — EPINEPHRINE 60 MCG/MIN: 1 INJECTION INTRAMUSCULAR; INTRAVENOUS; SUBCUTANEOUS at 20:54

## 2021-01-01 RX ADMIN — HYDROXYZINE HYDROCHLORIDE 25 MG: 25 TABLET, FILM COATED ORAL at 02:26

## 2021-01-01 RX ADMIN — POLYETHYLENE GLYCOL 3350 17 G: 17 POWDER, FOR SOLUTION ORAL at 15:24

## 2021-01-01 RX ADMIN — ROCURONIUM BROMIDE 100 MG: 10 INJECTION INTRAVENOUS at 17:22

## 2021-01-01 RX ADMIN — HYDROXYZINE HYDROCHLORIDE 25 MG: 25 TABLET, FILM COATED ORAL at 17:33

## 2021-01-01 RX ADMIN — FUROSEMIDE 40 MG: 10 INJECTION, SOLUTION INTRAMUSCULAR; INTRAVENOUS at 08:04

## 2021-01-01 RX ADMIN — TRAZODONE HYDROCHLORIDE 50 MG: 50 TABLET ORAL at 20:31

## 2021-01-01 RX ADMIN — HYDROCODONE BITARTRATE AND HOMATROPINE METHYLBROMIDE 5 ML: 5; 1.5 SOLUTION ORAL at 09:09

## 2021-01-01 RX ADMIN — FENTANYL CITRATE 200 MCG/HR: 50 INJECTION INTRAVENOUS at 14:09

## 2021-01-01 RX ADMIN — HYDROXYZINE HYDROCHLORIDE 25 MG: 25 TABLET, FILM COATED ORAL at 08:11

## 2021-01-01 RX ADMIN — FENTANYL CITRATE 100 MCG/HR: 50 INJECTION INTRAVENOUS at 00:12

## 2021-01-01 RX ADMIN — VANCOMYCIN HYDROCHLORIDE 1250 MG: 5 INJECTION, POWDER, LYOPHILIZED, FOR SOLUTION INTRAVENOUS at 12:08

## 2021-01-01 RX ADMIN — Medication 50 MG: at 09:06

## 2021-01-01 RX ADMIN — BUDESONIDE AND FORMOTEROL FUMARATE DIHYDRATE 2 PUFF: 160; 4.5 AEROSOL RESPIRATORY (INHALATION) at 07:08

## 2021-01-01 RX ADMIN — Medication 50 MG: at 08:50

## 2021-01-01 RX ADMIN — FLUTICASONE PROPIONATE 2 SPRAY: 50 SPRAY, METERED NASAL at 08:11

## 2021-01-01 RX ADMIN — CALCIUM CHLORIDE INJECTION 1000 MG: 100 INJECTION, SOLUTION INTRAVENOUS at 09:55

## 2021-01-01 RX ADMIN — HYDROCODONE BITARTRATE AND HOMATROPINE METHYLBROMIDE 5 ML: 5; 1.5 SOLUTION ORAL at 13:38

## 2021-01-01 RX ADMIN — FLUTICASONE PROPIONATE 2 SPRAY: 50 SPRAY, METERED NASAL at 09:24

## 2021-01-01 RX ADMIN — DOCUSATE SODIUM 100 MG: 50 LIQUID ORAL at 08:55

## 2021-01-01 RX ADMIN — LEVOFLOXACIN 500 MG: 5 INJECTION, SOLUTION INTRAVENOUS at 14:03

## 2021-01-01 RX ADMIN — FENTANYL CITRATE 100 MCG/HR: 50 INJECTION INTRAVENOUS at 11:00

## 2021-01-01 RX ADMIN — LEVOTHYROXINE SODIUM 75 MCG: 0.07 TABLET ORAL at 06:00

## 2021-01-01 RX ADMIN — METOCLOPRAMIDE 10 MG: 5 INJECTION, SOLUTION INTRAMUSCULAR; INTRAVENOUS at 19:30

## 2021-01-01 RX ADMIN — PROPOFOL 40 MCG/KG/MIN: 10 INJECTION, EMULSION INTRAVENOUS at 14:05

## 2021-01-01 RX ADMIN — HYDROCODONE BITARTRATE AND HOMATROPINE METHYLBROMIDE 5 ML: 5; 1.5 SOLUTION ORAL at 02:45

## 2021-01-01 RX ADMIN — NOREPINEPHRINE BITARTRATE 100 MCG/MIN: 1 INJECTION, SOLUTION, CONCENTRATE INTRAVENOUS at 09:59

## 2021-01-01 RX ADMIN — FLUCONAZOLE, SODIUM CHLORIDE 400 MG: 2 INJECTION INTRAVENOUS at 08:49

## 2021-01-01 RX ADMIN — HYDROCODONE BITARTRATE AND HOMATROPINE METHYLBROMIDE 5 ML: 5; 1.5 SOLUTION ORAL at 10:34

## 2021-01-01 RX ADMIN — PROPOFOL 40 MCG/KG/MIN: 10 INJECTION, EMULSION INTRAVENOUS at 17:46

## 2021-01-01 RX ADMIN — PROPOFOL 35 MCG/KG/MIN: 10 INJECTION, EMULSION INTRAVENOUS at 13:41

## 2021-01-01 RX ADMIN — ACETAMINOPHEN 650 MG: 325 TABLET ORAL at 01:07

## 2021-01-01 RX ADMIN — Medication 10 ML: at 09:00

## 2021-01-01 RX ADMIN — SODIUM BICARBONATE 50 MEQ: 84 INJECTION, SOLUTION INTRAVENOUS at 14:52

## 2021-01-01 RX ADMIN — TRAZODONE HYDROCHLORIDE 50 MG: 50 TABLET ORAL at 19:35

## 2021-01-01 RX ADMIN — ENOXAPARIN SODIUM 30 MG: 30 INJECTION SUBCUTANEOUS at 20:33

## 2021-01-01 RX ADMIN — SODIUM CHLORIDE 10 UNITS: 9 INJECTION, SOLUTION INTRAVENOUS at 18:49

## 2021-01-01 RX ADMIN — BARICITINIB 4 MG: 2 TABLET, FILM COATED ORAL at 08:50

## 2021-01-01 RX ADMIN — HYDROXYZINE HYDROCHLORIDE 25 MG: 25 TABLET, FILM COATED ORAL at 09:08

## 2021-01-01 RX ADMIN — SODIUM CHLORIDE 14.72 UNITS/HR: 9 INJECTION, SOLUTION INTRAVENOUS at 20:25

## 2021-01-01 RX ADMIN — HEPARIN SODIUM AND DEXTROSE 13 UNITS/KG/HR: 10000; 5 INJECTION INTRAVENOUS at 08:41

## 2021-01-01 RX ADMIN — FLUTICASONE PROPIONATE 2 SPRAY: 50 SPRAY, METERED NASAL at 05:56

## 2021-01-01 RX ADMIN — PANTOPRAZOLE SODIUM 40 MG: 40 INJECTION, POWDER, FOR SOLUTION INTRAVENOUS at 09:21

## 2021-01-01 RX ADMIN — FLUTICASONE PROPIONATE 2 SPRAY: 50 SPRAY, METERED NASAL at 12:04

## 2021-01-01 RX ADMIN — Medication 10 ML: at 21:05

## 2021-01-01 RX ADMIN — Medication 10 ML: at 21:23

## 2021-01-01 RX ADMIN — SODIUM CHLORIDE, PRESERVATIVE FREE 10 ML: 5 INJECTION INTRAVENOUS at 10:07

## 2021-01-01 RX ADMIN — SODIUM CHLORIDE, POTASSIUM CHLORIDE, SODIUM LACTATE AND CALCIUM CHLORIDE: 600; 310; 30; 20 INJECTION, SOLUTION INTRAVENOUS at 07:45

## 2021-01-01 RX ADMIN — LEVOTHYROXINE SODIUM 75 MCG: 0.07 TABLET ORAL at 06:39

## 2021-01-01 RX ADMIN — HYDROXYZINE HYDROCHLORIDE 25 MG: 25 TABLET, FILM COATED ORAL at 13:39

## 2021-01-01 RX ADMIN — ACETAMINOPHEN 650 MG: 325 TABLET ORAL at 21:31

## 2021-01-01 RX ADMIN — LEVOTHYROXINE SODIUM 75 MCG: 0.07 TABLET ORAL at 05:50

## 2021-01-01 RX ADMIN — SODIUM BICARBONATE 50 MEQ: 84 INJECTION, SOLUTION INTRAVENOUS at 14:48

## 2021-01-01 RX ADMIN — CISATRACURIUM BESYLATE 10 MG: 2 INJECTION INTRAVENOUS at 18:18

## 2021-01-01 RX ADMIN — HYDROXYZINE HYDROCHLORIDE 25 MG: 25 TABLET, FILM COATED ORAL at 10:34

## 2021-01-01 RX ADMIN — Medication 10 ML: at 08:51

## 2021-01-01 RX ADMIN — LEVOTHYROXINE SODIUM 75 MCG: 0.07 TABLET ORAL at 07:44

## 2021-01-01 RX ADMIN — SODIUM CHLORIDE, PRESERVATIVE FREE 10 ML: 5 INJECTION INTRAVENOUS at 08:04

## 2021-01-01 RX ADMIN — CALCIUM CHLORIDE INJECTION 1000 MG: 100 INJECTION, SOLUTION INTRAVENOUS at 16:45

## 2021-01-01 RX ADMIN — LEVOTHYROXINE SODIUM 75 MCG: 0.07 TABLET ORAL at 05:58

## 2021-01-01 RX ADMIN — ROCURONIUM BROMIDE 8 MCG/KG/MIN: 10 INJECTION INTRAVENOUS at 08:37

## 2021-01-01 RX ADMIN — Medication 10 ML: at 09:02

## 2021-01-01 RX ADMIN — Medication 10 ML: at 08:55

## 2021-01-01 RX ADMIN — FUROSEMIDE 20 MG: 20 INJECTION, SOLUTION INTRAMUSCULAR; INTRAVENOUS at 09:55

## 2021-01-01 RX ADMIN — SODIUM BICARBONATE 100 MEQ: 84 INJECTION, SOLUTION INTRAVENOUS at 18:50

## 2021-01-01 RX ADMIN — DIPHENHYDRAMINE HYDROCHLORIDE 25 MG: 50 INJECTION, SOLUTION INTRAMUSCULAR; INTRAVENOUS at 14:04

## 2021-01-01 RX ADMIN — CHLORHEXIDINE GLUCONATE 15 ML: 1.2 RINSE ORAL at 20:31

## 2021-01-01 RX ADMIN — HYDROCODONE BITARTRATE AND HOMATROPINE METHYLBROMIDE 5 ML: 5; 1.5 SOLUTION ORAL at 14:40

## 2021-01-01 RX ADMIN — LEVOTHYROXINE SODIUM 75 MCG: 0.07 TABLET ORAL at 06:17

## 2021-01-01 RX ADMIN — SODIUM CHLORIDE, PRESERVATIVE FREE 10 ML: 5 INJECTION INTRAVENOUS at 08:21

## 2021-01-01 RX ADMIN — CHLORHEXIDINE GLUCONATE 15 ML: 1.2 RINSE ORAL at 08:25

## 2021-01-01 RX ADMIN — HYDROXYZINE HYDROCHLORIDE 25 MG: 25 TABLET, FILM COATED ORAL at 18:40

## 2021-01-01 RX ADMIN — ACETAMINOPHEN 650 MG: 650 SUPPOSITORY RECTAL at 23:03

## 2021-01-01 RX ADMIN — FENTANYL CITRATE 125 MCG/HR: 50 INJECTION INTRAVENOUS at 21:33

## 2021-01-01 RX ADMIN — CISATRACURIUM BESYLATE 3.5 MCG/KG/MIN: 10 INJECTION INTRAVENOUS at 15:49

## 2021-01-01 RX ADMIN — TRAZODONE HYDROCHLORIDE 50 MG: 50 TABLET ORAL at 20:50

## 2021-01-01 RX ADMIN — TRAZODONE HYDROCHLORIDE 50 MG: 50 TABLET ORAL at 20:59

## 2021-01-01 RX ADMIN — SODIUM BICARBONATE: 84 INJECTION, SOLUTION INTRAVENOUS at 23:03

## 2021-01-01 RX ADMIN — SODIUM CHLORIDE 16 UNITS/HR: 9 INJECTION, SOLUTION INTRAVENOUS at 02:42

## 2021-01-01 RX ADMIN — PANTOPRAZOLE SODIUM 40 MG: 40 INJECTION, POWDER, FOR SOLUTION INTRAVENOUS at 08:55

## 2021-01-01 RX ADMIN — FENTANYL CITRATE 200 MCG/HR: 50 INJECTION INTRAVENOUS at 07:42

## 2021-01-01 RX ADMIN — ACETAMINOPHEN 650 MG: 650 SUPPOSITORY RECTAL at 05:18

## 2021-01-01 RX ADMIN — CISATRACURIUM BESYLATE 6.5 MCG/KG/MIN: 10 INJECTION INTRAVENOUS at 07:06

## 2021-01-01 RX ADMIN — ENOXAPARIN SODIUM 30 MG: 30 INJECTION SUBCUTANEOUS at 20:23

## 2021-01-01 RX ADMIN — PANTOPRAZOLE SODIUM 40 MG: 40 INJECTION, POWDER, FOR SOLUTION INTRAVENOUS at 07:59

## 2021-01-01 RX ADMIN — CHLORHEXIDINE GLUCONATE 15 ML: 1.2 RINSE ORAL at 19:47

## 2021-01-01 RX ADMIN — MEROPENEM 1000 MG: 1 INJECTION, POWDER, FOR SOLUTION INTRAVENOUS at 19:47

## 2021-01-01 RX ADMIN — CISATRACURIUM BESYLATE 6 MCG/KG/MIN: 2 INJECTION INTRAVENOUS at 17:04

## 2021-01-01 RX ADMIN — EPINEPHRINE 1 MG: 0.1 INJECTION, SOLUTION ENDOTRACHEAL; INTRACARDIAC; INTRAVENOUS at 14:38

## 2021-01-01 RX ADMIN — ACETAMINOPHEN 650 MG: 325 TABLET ORAL at 19:33

## 2021-01-01 RX ADMIN — DOCUSATE SODIUM 100 MG: 50 LIQUID ORAL at 08:20

## 2021-01-01 RX ADMIN — Medication 50 MG: at 08:25

## 2021-01-01 RX ADMIN — PROPOFOL 50 MCG/KG/MIN: 10 INJECTION, EMULSION INTRAVENOUS at 18:29

## 2021-01-01 RX ADMIN — HYDROCODONE BITARTRATE AND HOMATROPINE METHYLBROMIDE 5 ML: 5; 1.5 SOLUTION ORAL at 15:36

## 2021-01-01 RX ADMIN — MIDAZOLAM HYDROCHLORIDE 4 MG: 1 INJECTION, SOLUTION INTRAMUSCULAR; INTRAVENOUS at 17:21

## 2021-01-01 RX ADMIN — REMDESIVIR 200 MG: 100 INJECTION, POWDER, LYOPHILIZED, FOR SOLUTION INTRAVENOUS at 20:23

## 2021-01-01 RX ADMIN — EPINEPHRINE 1 MG: 0.1 INJECTION, SOLUTION ENDOTRACHEAL; INTRACARDIAC; INTRAVENOUS at 14:55

## 2021-01-01 RX ADMIN — Medication 10 ML: at 09:50

## 2021-01-01 RX ADMIN — ACETAMINOPHEN 1000 MG: 500 TABLET ORAL at 14:09

## 2021-01-01 RX ADMIN — FLUCONAZOLE, SODIUM CHLORIDE 400 MG: 2 INJECTION INTRAVENOUS at 08:00

## 2021-01-01 RX ADMIN — DEXTROSE MONOHYDRATE 25 G: 25 INJECTION, SOLUTION INTRAVENOUS at 16:44

## 2021-01-01 RX ADMIN — TIOTROPIUM BROMIDE INHALATION SPRAY 2 PUFF: 3.12 SPRAY, METERED RESPIRATORY (INHALATION) at 07:08

## 2021-01-01 RX ADMIN — BUDESONIDE AND FORMOTEROL FUMARATE DIHYDRATE 2 PUFF: 160; 4.5 AEROSOL RESPIRATORY (INHALATION) at 07:19

## 2021-01-01 RX ADMIN — DEXAMETHASONE 6 MG: 6 TABLET ORAL at 08:49

## 2021-01-01 RX ADMIN — HEPARIN SODIUM 10000 UNITS: 5000 INJECTION INTRAVENOUS; SUBCUTANEOUS at 08:26

## 2021-01-01 RX ADMIN — SODIUM CHLORIDE 500 ML: 9 INJECTION, SOLUTION INTRAVENOUS at 01:20

## 2021-01-01 RX ADMIN — CISATRACURIUM BESYLATE 0.7 MCG/KG/MIN: 10 INJECTION INTRAVENOUS at 14:02

## 2021-01-01 RX ADMIN — CISATRACURIUM BESYLATE 6.5 MCG/KG/MIN: 10 INJECTION INTRAVENOUS at 01:57

## 2021-01-01 RX ADMIN — REMDESIVIR 100 MG: 5 INJECTION INTRAVENOUS at 21:51

## 2021-01-01 RX ADMIN — SODIUM CHLORIDE, POTASSIUM CHLORIDE, SODIUM LACTATE AND CALCIUM CHLORIDE: 600; 310; 30; 20 INJECTION, SOLUTION INTRAVENOUS at 06:02

## 2021-01-01 RX ADMIN — LABETALOL HYDROCHLORIDE 10 MG: 5 INJECTION, SOLUTION INTRAVENOUS at 04:12

## 2021-01-01 RX ADMIN — SODIUM BICARBONATE 50 MEQ: 84 INJECTION, SOLUTION INTRAVENOUS at 11:04

## 2021-01-01 RX ADMIN — ACETAMINOPHEN 650 MG: 325 TABLET ORAL at 02:45

## 2021-01-01 RX ADMIN — Medication 10 ML: at 21:59

## 2021-01-01 RX ADMIN — BUDESONIDE AND FORMOTEROL FUMARATE DIHYDRATE 2 PUFF: 160; 4.5 AEROSOL RESPIRATORY (INHALATION) at 07:20

## 2021-01-01 RX ADMIN — VASOPRESSIN 0.03 UNITS/MIN: 20 INJECTION INTRAVENOUS at 12:26

## 2021-01-01 RX ADMIN — EPINEPHRINE 30 MCG/MIN: 1 INJECTION INTRAMUSCULAR; INTRAVENOUS; SUBCUTANEOUS at 22:25

## 2021-01-01 RX ADMIN — TIOTROPIUM BROMIDE INHALATION SPRAY 2 PUFF: 3.12 SPRAY, METERED RESPIRATORY (INHALATION) at 07:19

## 2021-01-01 RX ADMIN — PROPOFOL 40 MCG/KG/MIN: 10 INJECTION, EMULSION INTRAVENOUS at 00:12

## 2021-01-01 RX ADMIN — PROPOFOL 35 MCG/KG/MIN: 10 INJECTION, EMULSION INTRAVENOUS at 21:02

## 2021-01-01 RX ADMIN — Medication 10 ML: at 08:20

## 2021-01-01 RX ADMIN — Medication 10 ML: at 21:41

## 2021-01-01 RX ADMIN — DOCUSATE SODIUM 100 MG: 50 LIQUID ORAL at 20:50

## 2021-01-01 RX ADMIN — ACETAMINOPHEN 650 MG: 650 SUPPOSITORY RECTAL at 11:29

## 2021-01-01 RX ADMIN — HYDROCODONE BITARTRATE AND HOMATROPINE METHYLBROMIDE 5 ML: 5; 1.5 SOLUTION ORAL at 18:40

## 2021-01-01 RX ADMIN — Medication 10 ML: at 20:31

## 2021-01-01 RX ADMIN — FLUCONAZOLE, SODIUM CHLORIDE 400 MG: 2 INJECTION INTRAVENOUS at 08:20

## 2021-01-01 RX ADMIN — FENTANYL CITRATE 125 MCG/HR: 50 INJECTION INTRAVENOUS at 08:30

## 2021-01-01 RX ADMIN — EPINEPHRINE 1 MG: 0.1 INJECTION, SOLUTION ENDOTRACHEAL; INTRACARDIAC; INTRAVENOUS at 14:52

## 2021-01-01 RX ADMIN — ENOXAPARIN SODIUM 30 MG: 30 INJECTION SUBCUTANEOUS at 09:51

## 2021-01-01 RX ADMIN — ENOXAPARIN SODIUM 30 MG: 30 INJECTION SUBCUTANEOUS at 21:41

## 2021-01-01 RX ADMIN — PROPOFOL 40 MCG/KG/MIN: 10 INJECTION, EMULSION INTRAVENOUS at 09:58

## 2021-01-01 RX ADMIN — SODIUM ZIRCONIUM CYCLOSILICATE 10 G: 10 POWDER, FOR SUSPENSION ORAL at 14:05

## 2021-01-01 RX ADMIN — FENTANYL CITRATE 125 MCG/HR: 50 INJECTION INTRAVENOUS at 21:10

## 2021-01-01 RX ADMIN — Medication 10 ML: at 20:50

## 2021-01-01 RX ADMIN — SODIUM CHLORIDE: 9 INJECTION, SOLUTION INTRAVENOUS at 05:56

## 2021-01-01 RX ADMIN — ACETAMINOPHEN 650 MG: 325 TABLET ORAL at 06:00

## 2021-01-01 RX ADMIN — TRAZODONE HYDROCHLORIDE 50 MG: 50 TABLET ORAL at 21:52

## 2021-01-01 RX ADMIN — PROPOFOL 30 MCG/KG/MIN: 10 INJECTION, EMULSION INTRAVENOUS at 14:17

## 2021-01-01 RX ADMIN — PROPOFOL 35 MCG/KG/MIN: 10 INJECTION, EMULSION INTRAVENOUS at 00:57

## 2021-01-01 RX ADMIN — BARICITINIB 4 MG: 2 TABLET, FILM COATED ORAL at 09:59

## 2021-01-01 RX ADMIN — TIOTROPIUM BROMIDE INHALATION SPRAY 2 PUFF: 3.12 SPRAY, METERED RESPIRATORY (INHALATION) at 08:23

## 2021-01-01 RX ADMIN — HYDROCORTISONE SODIUM SUCCINATE 50 MG: 100 INJECTION, POWDER, FOR SOLUTION INTRAMUSCULAR; INTRAVENOUS at 11:10

## 2021-01-01 RX ADMIN — PROPOFOL 40 MCG/KG/MIN: 10 INJECTION, EMULSION INTRAVENOUS at 17:48

## 2021-01-01 RX ADMIN — INSULIN HUMAN 10 UNITS: 100 INJECTION, SOLUTION PARENTERAL at 18:52

## 2021-01-01 RX ADMIN — PROPOFOL 40 MCG/KG/MIN: 10 INJECTION, EMULSION INTRAVENOUS at 14:06

## 2021-01-01 RX ADMIN — DEXAMETHASONE 6 MG: 6 TABLET ORAL at 09:08

## 2021-01-01 RX ADMIN — FLUTICASONE PROPIONATE 2 SPRAY: 50 SPRAY, METERED NASAL at 10:01

## 2021-01-01 RX ADMIN — CHLORHEXIDINE GLUCONATE 15 ML: 1.2 RINSE ORAL at 20:50

## 2021-01-01 RX ADMIN — HYDROCODONE BITARTRATE AND HOMATROPINE METHYLBROMIDE 5 ML: 5; 1.5 SOLUTION ORAL at 06:00

## 2021-01-01 RX ADMIN — ENOXAPARIN SODIUM 30 MG: 30 INJECTION SUBCUTANEOUS at 21:04

## 2021-01-01 RX ADMIN — SODIUM CHLORIDE 9.79 UNITS/HR: 9 INJECTION, SOLUTION INTRAVENOUS at 00:13

## 2021-01-01 RX ADMIN — EPINEPHRINE 1 MG: 0.1 INJECTION, SOLUTION ENDOTRACHEAL; INTRACARDIAC; INTRAVENOUS at 14:49

## 2021-01-01 RX ADMIN — METHYLPREDNISOLONE SODIUM SUCCINATE 125 MG: 125 INJECTION, POWDER, FOR SOLUTION INTRAMUSCULAR; INTRAVENOUS at 14:09

## 2021-01-01 RX ADMIN — FLUTICASONE PROPIONATE 2 SPRAY: 50 SPRAY, METERED NASAL at 09:00

## 2021-01-01 RX ADMIN — REMDESIVIR 100 MG: 5 INJECTION INTRAVENOUS at 20:52

## 2021-01-01 RX ADMIN — PROPOFOL 40 MCG/KG/MIN: 10 INJECTION, EMULSION INTRAVENOUS at 04:10

## 2021-01-01 RX ADMIN — PROPOFOL 45 MCG/KG/MIN: 10 INJECTION, EMULSION INTRAVENOUS at 05:21

## 2021-01-01 RX ADMIN — EPINEPHRINE 1 MG: 0.1 INJECTION, SOLUTION ENDOTRACHEAL; INTRACARDIAC; INTRAVENOUS at 15:07

## 2021-01-01 RX ADMIN — CISATRACURIUM BESYLATE 6 MCG/KG/MIN: 10 INJECTION INTRAVENOUS at 15:08

## 2021-01-01 RX ADMIN — HYDROXYZINE HYDROCHLORIDE 25 MG: 25 TABLET, FILM COATED ORAL at 10:07

## 2021-01-01 RX ADMIN — PROPOFOL 35 MCG/KG/MIN: 10 INJECTION, EMULSION INTRAVENOUS at 18:01

## 2021-01-01 RX ADMIN — PROPOFOL 40 MCG/KG/MIN: 10 INJECTION, EMULSION INTRAVENOUS at 14:30

## 2021-01-01 RX ADMIN — BISACODYL 10 MG: 10 SUPPOSITORY RECTAL at 10:44

## 2021-01-01 RX ADMIN — SODIUM CHLORIDE, POTASSIUM CHLORIDE, SODIUM LACTATE AND CALCIUM CHLORIDE: 600; 310; 30; 20 INJECTION, SOLUTION INTRAVENOUS at 08:14

## 2021-01-01 RX ADMIN — TRAZODONE HYDROCHLORIDE 50 MG: 50 TABLET ORAL at 20:21

## 2021-01-01 RX ADMIN — CISATRACURIUM BESYLATE 3.5 MCG/KG/MIN: 10 INJECTION INTRAVENOUS at 23:56

## 2021-01-01 RX ADMIN — DEXAMETHASONE 6 MG: 6 TABLET ORAL at 08:11

## 2021-01-01 RX ADMIN — HYDROCODONE BITARTRATE AND HOMATROPINE METHYLBROMIDE 5 ML: 5; 1.5 SOLUTION ORAL at 22:44

## 2021-01-01 RX ADMIN — SODIUM CHLORIDE, POTASSIUM CHLORIDE, SODIUM LACTATE AND CALCIUM CHLORIDE: 600; 310; 30; 20 INJECTION, SOLUTION INTRAVENOUS at 16:16

## 2021-01-01 RX ADMIN — SODIUM CHLORIDE, PRESERVATIVE FREE 10 ML: 5 INJECTION INTRAVENOUS at 08:55

## 2021-01-01 RX ADMIN — FENTANYL CITRATE 100 MCG/HR: 50 INJECTION INTRAVENOUS at 14:03

## 2021-01-01 RX ADMIN — ACETAMINOPHEN 650 MG: 325 TABLET ORAL at 12:03

## 2021-01-01 RX ADMIN — TRAZODONE HYDROCHLORIDE 50 MG: 50 TABLET ORAL at 21:48

## 2021-01-01 RX ADMIN — CISATRACURIUM BESYLATE 6.5 MCG/KG/MIN: 10 INJECTION INTRAVENOUS at 05:02

## 2021-01-01 RX ADMIN — PROPOFOL 35 MCG/KG/MIN: 10 INJECTION, EMULSION INTRAVENOUS at 09:40

## 2021-01-01 RX ADMIN — LEVOTHYROXINE SODIUM 75 MCG: 0.07 TABLET ORAL at 05:19

## 2021-01-01 RX ADMIN — CHLORHEXIDINE GLUCONATE 15 ML: 1.2 RINSE ORAL at 09:00

## 2021-01-01 RX ADMIN — SODIUM CHLORIDE 8.46 UNITS/HR: 9 INJECTION, SOLUTION INTRAVENOUS at 01:56

## 2021-01-01 ASSESSMENT — PULMONARY FUNCTION TESTS
PIF_VALUE: 38
PIF_VALUE: 41
PIF_VALUE: 39
PIF_VALUE: 39
PIF_VALUE: 45
PIF_VALUE: 38
PIF_VALUE: 37
PIF_VALUE: 46
PIF_VALUE: 41
PIF_VALUE: 47
PIF_VALUE: 39
PIF_VALUE: 47
PIF_VALUE: 36
PIF_VALUE: 60
PIF_VALUE: 40
PIF_VALUE: 36
PIF_VALUE: 55
PIF_VALUE: 44
PIF_VALUE: 41
PIF_VALUE: 39
PIF_VALUE: 50
PIF_VALUE: 45
PIF_VALUE: 40
PIF_VALUE: 42
PIF_VALUE: 40
PIF_VALUE: 52
PIF_VALUE: 56
PIF_VALUE: 54
PIF_VALUE: 37
PIF_VALUE: 39
PIF_VALUE: 39
PIF_VALUE: 38
PIF_VALUE: 38
PIF_VALUE: 55
PIF_VALUE: 37
PIF_VALUE: 36
PIF_VALUE: 48
PIF_VALUE: 57
PIF_VALUE: 40
PIF_VALUE: 38
PIF_VALUE: 43
PIF_VALUE: 46
PIF_VALUE: 47
PIF_VALUE: 45
PIF_VALUE: 38
PIF_VALUE: 43
PIF_VALUE: 38
PIF_VALUE: 49
PIF_VALUE: 38
PIF_VALUE: 61
PIF_VALUE: 46
PIF_VALUE: 49
PIF_VALUE: 57
PIF_VALUE: 57
PIF_VALUE: 42
PIF_VALUE: 43
PIF_VALUE: 54
PIF_VALUE: 40
PIF_VALUE: 39
PIF_VALUE: 46
PIF_VALUE: 45
PIF_VALUE: 66
PIF_VALUE: 36
PIF_VALUE: 39
PIF_VALUE: 56
PIF_VALUE: 45
PIF_VALUE: 37
PIF_VALUE: 47
PIF_VALUE: 47
PIF_VALUE: 37
PIF_VALUE: 50
PIF_VALUE: 46
PIF_VALUE: 37
PIF_VALUE: 38
PIF_VALUE: 40
PIF_VALUE: 41
PIF_VALUE: 45
PIF_VALUE: 58
PIF_VALUE: 47
PIF_VALUE: 45
PIF_VALUE: 49
PIF_VALUE: 47
PIF_VALUE: 51
PIF_VALUE: 53
PIF_VALUE: 37
PIF_VALUE: 36
PIF_VALUE: 38
PIF_VALUE: 39
PIF_VALUE: 49
PIF_VALUE: 39
PIF_VALUE: 36
PIF_VALUE: 39
PIF_VALUE: 37
PIF_VALUE: 38
PIF_VALUE: 41
PIF_VALUE: 40
PIF_VALUE: 45
PIF_VALUE: 39
PIF_VALUE: 43
PIF_VALUE: 38
PIF_VALUE: 48
PIF_VALUE: 56
PIF_VALUE: 48
PIF_VALUE: 39
PIF_VALUE: 37
PIF_VALUE: 39
PIF_VALUE: 41
PIF_VALUE: 49
PIF_VALUE: 47
PIF_VALUE: 39
PIF_VALUE: 57
PIF_VALUE: 44
PIF_VALUE: 59
PIF_VALUE: 39
PIF_VALUE: 44
PIF_VALUE: 58
PIF_VALUE: 56
PIF_VALUE: 42
PIF_VALUE: 50
PIF_VALUE: 34
PIF_VALUE: 52
PIF_VALUE: 47
PIF_VALUE: 58
PIF_VALUE: 36
PIF_VALUE: 38
PIF_VALUE: 39
PIF_VALUE: 39
PIF_VALUE: 36
PIF_VALUE: 41
PIF_VALUE: 49
PIF_VALUE: 40
PIF_VALUE: 51
PIF_VALUE: 38
PIF_VALUE: 50
PIF_VALUE: 39
PIF_VALUE: 40
PIF_VALUE: 37
PIF_VALUE: 36
PIF_VALUE: 36
PIF_VALUE: 53
PIF_VALUE: 66
PIF_VALUE: 36
PIF_VALUE: 43
PIF_VALUE: 38
PIF_VALUE: 40
PIF_VALUE: 35
PIF_VALUE: 45
PIF_VALUE: 46
PIF_VALUE: 35
PIF_VALUE: 38
PIF_VALUE: 39
PIF_VALUE: 57
PIF_VALUE: 43
PIF_VALUE: 41
PIF_VALUE: 38
PIF_VALUE: 40
PIF_VALUE: 48
PIF_VALUE: 53
PIF_VALUE: 35
PIF_VALUE: 38
PIF_VALUE: 40
PIF_VALUE: 36
PIF_VALUE: 42
PIF_VALUE: 38
PIF_VALUE: 54
PIF_VALUE: 43
PIF_VALUE: 44
PIF_VALUE: 56
PIF_VALUE: 36
PIF_VALUE: 35
PIF_VALUE: 45
PIF_VALUE: 43
PIF_VALUE: 37
PIF_VALUE: 36
PIF_VALUE: 42
PIF_VALUE: 55
PIF_VALUE: 37
PIF_VALUE: 58
PIF_VALUE: 36
PIF_VALUE: 40
PIF_VALUE: 38
PIF_VALUE: 45
PIF_VALUE: 37
PIF_VALUE: 55
PIF_VALUE: 38
PIF_VALUE: 43
PIF_VALUE: 48
PIF_VALUE: 38
PIF_VALUE: 37
PIF_VALUE: 44
PIF_VALUE: 34
PIF_VALUE: 40
PIF_VALUE: 38
PIF_VALUE: 38
PIF_VALUE: 45
PIF_VALUE: 40
PIF_VALUE: 37
PIF_VALUE: 48
PIF_VALUE: 36
PIF_VALUE: 35
PIF_VALUE: 36
PIF_VALUE: 40
PIF_VALUE: 38

## 2021-01-01 ASSESSMENT — PAIN SCALES - GENERAL
PAINLEVEL_OUTOF10: 0
PAINLEVEL_OUTOF10: 9
PAINLEVEL_OUTOF10: 0
PAINLEVEL_OUTOF10: 2
PAINLEVEL_OUTOF10: 0
PAINLEVEL_OUTOF10: 3
PAINLEVEL_OUTOF10: 0
PAINLEVEL_OUTOF10: 8
PAINLEVEL_OUTOF10: 0
PAINLEVEL_OUTOF10: 8
PAINLEVEL_OUTOF10: 3
PAINLEVEL_OUTOF10: 0
PAINLEVEL_OUTOF10: 6
PAINLEVEL_OUTOF10: 0
PAINLEVEL_OUTOF10: 10
PAINLEVEL_OUTOF10: 0
PAINLEVEL_OUTOF10: 3
PAINLEVEL_OUTOF10: 0
PAINLEVEL_OUTOF10: 7
PAINLEVEL_OUTOF10: 0
PAINLEVEL_OUTOF10: 6
PAINLEVEL_OUTOF10: 0
PAINLEVEL_OUTOF10: 4
PAINLEVEL_OUTOF10: 0

## 2021-01-01 ASSESSMENT — ENCOUNTER SYMPTOMS
SHORTNESS OF BREATH: 1
SHORTNESS OF BREATH: 1
SORE THROAT: 0
NAUSEA: 0
VOMITING: 0
SORE THROAT: 0
VOMITING: 0
GASTROINTESTINAL NEGATIVE: 1
GASTROINTESTINAL NEGATIVE: 1
SHORTNESS OF BREATH: 1
NAUSEA: 0
CHEST TIGHTNESS: 0
SHORTNESS OF BREATH: 1
ABDOMINAL PAIN: 0
COUGH: 1
COUGH: 1
CHEST TIGHTNESS: 0
COUGH: 1
EYE PAIN: 0
ABDOMINAL PAIN: 0
EYE PAIN: 0
COUGH: 1

## 2021-01-01 ASSESSMENT — PAIN DESCRIPTION - FREQUENCY
FREQUENCY: CONTINUOUS

## 2021-01-01 ASSESSMENT — PAIN DESCRIPTION - DESCRIPTORS: DESCRIPTORS: ACHING

## 2021-01-01 ASSESSMENT — PAIN DESCRIPTION - PAIN TYPE
TYPE: ACUTE PAIN
TYPE: ACUTE PAIN

## 2021-01-01 ASSESSMENT — PAIN DESCRIPTION - LOCATION
LOCATION: HEAD
LOCATION: BACK
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
## 2021-09-30 NOTE — CARE COORDINATION
Patient contacted regarding COVID-19 diagnosis. Discussed COVID-19 related testing which was available at this time. Test results were positive. Patient informed of results, if available? Yes. Ambulatory Care Manager contacted the patient by telephone to perform post discharge assessment. Call within 2 business days of discharge: Yes. Verified name and  with patient as identifiers. Provided introduction to self, and explanation of the CTN/ACM role, and reason for call due to risk factors for infection and/or exposure to COVID-19. Symptoms reviewed with patient who verbalized the following symptoms: pain or aching joints, cough, no new symptoms and no worsening symptoms. Due to no new or worsening symptoms encounter was not routed to provider for escalation. Discussed follow-up appointments. If no appointment was previously scheduled, appointment scheduling offered: Yes. [facility] follow up appointment(s): No future appointments. Non-[facility] follow up appointment(s): N/A    Non-face-to-face services provided:  Obtained and reviewed discharge summary and/or continuity of care documents     Advance Care Planning:   Does patient have an Advance Directive:  not on file. Educated patient about risk for severe COVID-19 due to risk factors according to CDC guidelines. ACM reviewed discharge instructions, medical action plan and red flag symptoms with the patient who verbalized understanding. Discussed COVID vaccination status: Yes and unvaccinated-refused information. Education provided on COVID-19 vaccination as appropriate. Discussed exposure protocols and quarantine with CDC Guidelines. Patient was given an opportunity to verbalize any questions and concerns and agrees to contact ACM or health care provider for questions related to their healthcare. Reviewed and educated patient on any new and changed medications related to discharge diagnosis     Was patient discharged with a pulse oximeter?  No ACM provided contact information. No further follow-up call identified based on severity of symptoms and risk factors.

## 2021-10-01 PROBLEM — U07.1 COVID-19: Status: ACTIVE | Noted: 2021-01-01
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
## 2021-10-01 NOTE — ED TRIAGE NOTES
Pt presents to ED from home with c/o shortness of breath. Pt states that he was evaluated on 09/29, and diagnosed with COVID pneumonia and sent home with azithromycin and decadron. Pt was 87% on RA upon arrival to ED. Upon assessment, pt is A/Ox4, skin p/w/d, resp even and unlabored, msp's intact. Pt speaking in full sentences. Pt denies chest pain, n/v/d, fever, and chills.

## 2021-10-01 NOTE — ACP (ADVANCE CARE PLANNING)
Advance Care Planning     Advance Care Planning Activator (Inpatient)  Conversation Note      Date of ACP Conversation: 10/1/2021     Conversation Conducted with: Patient with Decision Making Capacity    ACP Activator: Tarah Griffin    Pt stated that he could not make a decision regarding code status at this time but states \"my wife is a nurse\" per his request he was given information on advance directives.     Health Care Decision Maker:     Current Designated Health Care Decision Maker:     Primary Decision Maker: Denzel Farris spouse - [phone]

## 2021-10-01 NOTE — PROGRESS NOTES
INF DIS CONSULT CALLED TO DR Monica Justice VIA ANSWERING SERVICE Electronically signed by Carol Prajapati on 10/1/2021 at 6:05 PM  PULM CONSULT CALLED TO DR Hill Electronically signed by Carol Prajapati on 10/1/2021 at 7:07 PM

## 2021-10-01 NOTE — CARE COORDINATION
Received a phone call from Vesna Ortiz' wife, Lori Khoury. Lori Khoury states Vesna Ortiz is having a lot of coughing and she would like a cough syrup with codeine in it. Lori Khoury also says Vesna Ortiz' O2 saturation is dropping into the 70s at times while he is sleeping and staying consistently in the 80s throughout the night. Vesna Ortiz is wearing his CPAP and she states he was 85% at the time of our phone call. Lori Khoury is asking if I can get him some supplemental O2 to wear at night and the cough syrup. Vesna Ortiz sees a PCP at [facility]. This RN offered to call and speak with the staff at that office. Lori Khoury states she has spoken to them but it might not hurt for me to as well. I explained that I feel Vesna Ortiz should return to the ED with an oxygen saturation that low and I believe Dr. Richie London office will agree. oLri Khoury then said they were interested in a [facility] PCP. I offered to have a virtual visit scheduled with one of our providers but, again, I feel he should return to the emergency department and I believe that will be there recommendation too. Lori Khoury states she knows Vesna Ortiz will not stay for admission. I advised her that this is his right but maybe if they go there an he refuses admission, they will send him home with supplemental O2 and he may be able to maintain at home. Lori Khoury is going to speak with Vesna Ortiz and they will decide what to do at that point. Lori Khoury is very curious about the COVID PCR test results. She is aware the POCT was positive but she would like to make sure the PCR test is positive as well. Lori Peng states she insisted they do that test too. This RN advised her those results are not posted yet and as soon as they are, they will be available on his MyChart. Lori Khoury requested I text the results when they post but I advised her I am unable to do that but if I see they are back, I will call. Lori Peng stated she will text me and let me know how he is doing and what they decide.

## 2021-10-01 NOTE — ED NOTES
O2 removed prior to ABG. Pulse-ox noted at 88% on RA.       Felicitas Suarez, PATIENCE  10/01/21 1104

## 2021-10-01 NOTE — CARE COORDINATION
[facility] [place] Case Management Initial Discharge Assessment    Met with Patient to discuss discharge plan. PCP: Maria Elena Marshall MD                                Date of Last Visit: august    If no PCP, list provided? N/A    Discharge Planning    Living Arrangements: independently at home    Who do you live with? wife    Who helps you with your care:  self    If lives at home:     Do you have any barriers navigating in your home? no    Patient can perform ADL? Yes    Current Services (outpatient and in home) :  None    Dialysis: No    Is transportation available to get to your appointments? Yes    DME Equipment:  no    Respiratory equipment: None    Respiratory provider:  no     [facility]    Consult with Medication Assistance Program?  No      [facility]ined    Patient agreeable to SNF/Rehab? Declined    Other discharge needs identified? N/A    Does Patient Have a High-Risk for Readmission Diagnosis (CHF, PN, MI, COPD)? Yes     Initial Discharge Plan? (Note: please see concurrent daily documentation for any updates after initial note). Admission orders are not entered yet. Potential for home o2 as indicated with covid 19. Cm to assess for further d/c needs and referrals.     Readmission Risk              Risk of Unplanned Readmission:  6         Electronically signed by Malathi Hammer on 10/1/2021 at 2:16 PM

## 2021-10-01 NOTE — ED NOTES
Per Donnie Severe, RN-Care Coordination, HIPAA code [id number]. Wife notified.      Jas Garcia RN  10/01/21 0426

## 2021-10-01 NOTE — ED NOTES
Pt sitting upright in ED cot w/no s/s of distress noted. Resp even and unlabored. Tele pack applied; pt updated on bed assignment. Will continue to monitor pt.      Manuel Morgan RN  10/01/21 0634
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
## 2021-10-02 NOTE — PROGRESS NOTES
[facility] Respiratory Therapy Evaluation   Current Order:  Albuterol MDI 2 puffs Q6 PRN      Home Regimen: PRN      Ordering Physician: Baron Thapa  Re-evaluation Date:  ---     Diagnosis: Covid 19      Patient Status: Stable / Unstable + Physician notified    The following MDI Criteria must be met in order to convert aerosol to MDI with spacer. If unable to meet, MDI will be converted to aerosol:  []  Patient able to demonstrate the ability to use MDI effectively  []  Patient alert and cooperative  []  Patient able to take deep breath with 5-10 second hold  []  Medication(s) available in this delivery method   []  Peak flow greater than or equal to 200 ml/min            Current Order Substituted To  (same drug, same frequency)   Aerosol to MDI [] Albuterol Sulfate 0.083% unit dose by aerosol Albuterol Sulfate MDI 2 puffs by inhalation with spacer    [] Levalbuterol 1.25 mg unit dose by aerosol Levalbuterol MDI 2 puffs by inhalation with spacer    [] Levalbuterol 0.63 mg unit dose by aerosol Levalbuterol MDI 2 puffs by inhalation with spacer    [] Ipratropium Bromide 0.02% unit dose by aerosol Ipratropium Bromide MDI 2 puffs by inhalation with spacer    [] Duoneb (Ipratropium + Albuterol) unit dose by aerosol Ipratropium MDI + Albuterol MDI 2 puffs by inhalation w/spacer   MDI to Aerosol [] Albuterol Sulfate MDI Albuterol Sulfate 0.083% unit dose by aerosol    [] Levalbuterol MDI 2 puffs by inhalation Levalbuterol 1.25 mg unit dose by aerosol    [] Ipratropium Bromide MDI by inhalation Ipratropium Bromide 0.02% unit dose by aerosol    [] Combivent (Ipratropium + Albuterol) MDI by inhalation Duoneb (Ipratropium + Albuterol) unit dose by aerosol       Treatment Assessment [Frequency/Schedule]:  Change frequency to: _______No Changes___________________________________________per Protocol, P&T, MEC      Points 0 1 2 3 4   Pulmonary Status  Non-Smoker  []   Smoking history   < 20 pack years  []   Smoking history  ?  21 pack years  []   Pulmonary Disorder  (acute or chronic)  [x]   Severe or Chronic w/ Exacerbation  []     Surgical Status No [x]   Surgeries     General []   Surgery Lower []   Abdominal Thoracic or []   Upper Abdominal Thoracic with  PulmonaryDisorder  []     Chest X-ray Clear/Not  Ordered     [x]  Chronic Changes  Results Pending  []  Infiltrates, atelectasis, pleural effusion, or edema  []  Infiltrates in more than one lobe []  Infiltrate + Atelectasis, &/or pleural effusion  []    Respiratory Pattern Regular,  RR = 12-20 [x]  Increased,  RR = 21-25 []  ANDERSEN, irregular,  or RR = 26-30 []  Decreased FEV1  or RR = 31-35 []  Severe SOB, use  of accessory muscles, or RR ? 35  []    Mental Status Alert, oriented,  Cooperative [x]  Confused but Follows commands []  Lethargic or unable to follow commands []  Obtunded  []  Comatose  []    Breath Sounds Clear to  auscultation  []  Decreased unilaterally or  in bases only []  Decreased  bilaterally  [x]  Crackles or intermittent wheezes []  Wheezes []    Cough Strong, Spontan., & nonproductive [x]  Strong,  spontaneous, &  productive []  Weak,  Nonproductive []  Weak, productive or  with wheezes []  No spontaneous  cough or may require suctioning []    Level of Activity Ambulatory [x]  Ambulatory w/ Assist  []  Non-ambulatory []  Paraplegic []  Quadriplegic []    Total    Score:___5____     Triage Score:___5_____      Tri       Triage:     1. (>20) Freq: Q3    2. (16-20) Freq: Q4   3. (11-15) Freq: QID & Albuterol Q2 PRN    4. (6-10) Freq: TID & Albuterol Q2 PRN    5. (0-5) Freq Q4prn

## 2021-10-02 NOTE — CONSULTS
Infectious Disease     Patient Name: Chen Lopez  Date: 10/2/2021  YOB: 1970  Medical Record Number: [id number]      COVID-19 pneumonia  Hypoxia      History of Present Illness:  Hypothyroidism hyperlipidemia hypogonadism irritable bowel  Asthma pituitary microadenoma obesity polycythemia  Obstructive sleep apnea    Patient presented to emergency room 9/29/2021 with cough shortness of breath positive for COVID-19    Chest x-ray showing bilateral infiltrates    Patient was discharged home on Zithromax and Decadron    Return to the emergency department 10/1/2021 with hypoxia satting in the 80s placed on 4 L nasal cannula          High sensitivity CRP [[id number]] (Abnormal) Collected: 10/02/21 0600     Specimen: Blood Updated: 10/02/21 0940      CRP High Sensitivity 82.1 mg/L      Procalcitonin [[id number]] (Abnormal) Collected: 10/02/21 0600     Specimen: Blood Updated: 10/02/21 0728      Procalcitonin 0.31 ng/mL                  The EXAMINATION: CT scan of the chest with contrast (pulmonary embolism protocol)       INDICATION: Chest pain and shortness of breath.       COMPARISON: None       TECHNIQUE: Helical CT was performed through the chest utilizing 100 cc of 370 intravenous contrast.  Images were obtained with bolus tracking in order to opacify the pulmonary arteries.  Both MIP and 3D volume rendered reconstructions were performed.       FINDINGS: There is is a limited examination due to suboptimal opacification. Within limits examination no central or proximal pulmonary emboli.       There is patchy multifocal to coalescent airspace disease diffusely throughout the lung parenchyma there is some scattered areas of atelectasis. No pleural effusions.  No pneumothoraces.       There is periaortic, pretracheal, parahilar and subcarinal adenopathy.       Field-of-view visualized abdominal contents are unremarkable.       There is multilevel degenerative changes of the thoracic spine superimposed upon therapy - testosterion      fluticasone (FLONASE) 50 MCG/ACT nasal spray 2 sprays by Each Nostril route daily      Fluticasone-Umeclidin-Vilant (TRELEGY ELLIPTA) 200-62.5-25 MCG/INH AEPB Inhale 1 Dose into the lungs daily      traZODone (DESYREL) 50 MG tablet Take 50 mg by mouth nightly      HYDROcodone-acetaminophen (NORCO) 5-325 MG per tablet Take 1 tablet by mouth every 6 hours as needed for Pain.  dexamethasone (DECADRON) 6 MG tablet Take 1 tablet by mouth daily (with breakfast) for 7 days 7 tablet 0    azithromycin (ZITHROMAX Z-MIRNA) 250 MG tablet Take 2 tablets (500 mg) on Day 1, and then take 1 tablet (250 mg) on days 2 through 5. 1 packet 0    albuterol sulfate HFA (PROAIR HFA) 108 (90 Base) MCG/ACT inhaler Inhale 2 puffs into the lungs every 6 hours as needed for Wheezing 1 Inhaler 2    levothyroxine (SYNTHROID) 75 MCG tablet Take 1 tablet by mouth Daily (Patient taking differently: Take 100 mcg by mouth Daily ) 30 tablet 3    meloxicam (MOBIC) 15 MG tablet TAKE 1 TABLET DAILY 90 tablet 2       Allergies   Allergen Reactions    Aleve [Naproxen]      Causes nasal polyps         Family History   Problem Relation Age of Onset    Arthritis Mother         RA, Fibromyalgia    Heart Disease Mother         92% blockage in heart, stent    Heart Disease Father     High Blood Pressure Father     High Cholesterol Father     Diabetes Father     Cancer Father         throat    Diabetes Brother          Physical Exam:      Physical Exam  Constitutional:       General: He is in acute distress. Appearance: He is obese. He is ill-appearing. HENT:      Head: Normocephalic and atraumatic. Eyes:      Extraocular Movements: Extraocular movements intact. Pupils: Pupils are equal, round, and reactive to light. Cardiovascular:      Heart sounds: Normal heart sounds. No murmur heard. Pulmonary:      Effort: Pulmonary effort is normal. No respiratory distress.       Breath sounds: Normal breath immunosuppressant medication use and rheumatoid arthritis there is no guarantee that this medicine will improve his course that could lead to worsening with other infections but it has been shown to be beneficial in some cases and currently is available through emergency release authorization from the FDA    FDA information sheet is given to patient    Patient agrees to trying the baricitinib
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
## 2021-10-02 NOTE — PROGRESS NOTES
function     Asthma  - continue home regimen     HARLEEN  - continue CPAP     Hypothyroidism, hypogonadism, insomnia   - continue home meds           Diet: ADULT DIET;  Regular; 5 carb choices (75 gm/meal)    Code Status: Full Code              Electronically signed by Gregary Moritz, MD on 10/2/2021 at 10:21 AM

## 2021-10-03 NOTE — PROGRESS NOTES
Infectious Disease     Patient Name: Luis Heredia  Date: 10/3/2021  YOB: 1970  Medical Record Number: [id number]      [id number] pneumonia  Hypoxia            Continues to be short of breath and comfortable 55 L a minute satting 85%        The EXAMINATION: CT scan of the chest with contrast (pulmonary embolism protocol)       INDICATION: Chest pain and shortness of breath.       COMPARISON: None       TECHNIQUE: Helical CT was performed through the chest utilizing 100 cc of 370 intravenous contrast.  Images were obtained with bolus tracking in order to opacify the pulmonary arteries.  Both MIP and 3D volume rendered reconstructions were performed.       FINDINGS: There is is a limited examination due to suboptimal opacification. Within limits examination no central or proximal pulmonary emboli.       There is patchy multifocal to coalescent airspace disease diffusely throughout the lung parenchyma there is some scattered areas of atelectasis. No pleural effusions. No pneumothoraces.       There is periaortic, pretracheal, parahilar and subcarinal adenopathy.       Field-of-view visualized abdominal contents are unremarkable.       There is multilevel degenerative changes of the thoracic spine superimposed upon a mild to moderate dorsal kyphosis               Impression   1. LIMITED EXAMINATION DUE TO SUBOPTIMAL OPACIFICATION. WITHIN LIMITS EXAMINATION NO CENTRAL OR PROXIMAL PULMONARY EMBOLI.   2. PATCHY MULTIFOCAL TO COALESCENT AIRSPACE DISEASE DIFFUSELY THROUGHOUT THE LUNG PARENCHYMA. COMMONLY REPORT IMAGING FEATURES OF (COVID-19) PNEUMONIA ARE PRESENT. OTHER PROCESSES SUCH AS INFLUENZA, PNEUMONIA AND ORGANIZING PNEUMONIA AS CAN BE SEEN WITH    DRUG TOXICITY AND CONNECTIVE TISSUE DISEASE CAN CAUSE A SIMILAR IMAGING PATTERN. 93% saturation 50 L a minute          Review of Systems   Constitutional: Negative for fatigue. HENT: Negative.     Respiratory: Positive for cough and shortness of breath. Cardiovascular: Negative. Gastrointestinal: Negative. Physical Exam  Constitutional:       General: He is in acute distress. Appearance: He is ill-appearing. Cardiovascular:      Heart sounds: Normal heart sounds. No murmur heard. Pulmonary:      Effort: Pulmonary effort is normal. No respiratory distress. Breath sounds: Normal breath sounds. No wheezing or rales. Abdominal:      General: Abdomen is flat. Bowel sounds are normal. There is no distension. Palpations: There is no mass. Tenderness: There is no abdominal tenderness. Blood pressure (!) 155/83, pulse 90, temperature 98.7 °F (37.1 °C), temperature source Oral, resp. rate 20, height 6' 1\" (1.854 m), weight 290 lb (131.5 kg), SpO2 93 %.       .   Lab Results   Component Value Date    WBC 6.5 10/03/2021    HGB 15.6 10/03/2021    HCT 46.7 10/03/2021    MCV 81.4 10/03/2021     10/03/2021     Lab Results   Component Value Date     10/03/2021    K 5.2 10/03/2021    CL 96 10/03/2021    CO2 28 10/03/2021    BUN 23 10/03/2021    CREATININE 1.14 10/03/2021    GLUCOSE 197 10/03/2021    CALCIUM 9.1 10/03/2021                    PLAN:  COVID-19 pneumonia  Hypoxia    On dexamethasone remdesivir  O2  baricitinib

## 2021-10-03 NOTE — PROGRESS NOTES
Hospitalist Progress Note      PCP: Rafal Sanchez MD    Date of Admission: 10/1/2021    Chief Complaint:  afebrile, stable HD, on HFNC-55 liters this morning per RT, having productive cough    Medications:  Reviewed    Infusion Medications    dextrose      sodium chloride       Scheduled Medications    baricitinib  4 mg Oral Daily    sodium chloride flush  10 mL IntraVENous BID    sodium chloride flush  5-40 mL IntraVENous 2 times per day    enoxaparin  30 mg SubCUTAneous BID    insulin lispro  0-6 Units SubCUTAneous TID WC    insulin lispro  0-3 Units SubCUTAneous Nightly    dexamethasone  6 mg Oral Daily    levothyroxine  75 mcg Oral Daily    clomiPHENE  50 mg Oral Daily    fluticasone  2 spray Each Nostril Daily    traZODone  50 mg Oral Nightly    budesonide-formoterol  2 puff Inhalation BID    tiotropium  2 puff Inhalation Daily    remdesivir IVPB  100 mg IntraVENous Q24H     PRN Meds: hydrOXYzine, glucose, dextrose, glucagon (rDNA), dextrose, sodium chloride flush, sodium chloride, ondansetron **OR** ondansetron, polyethylene glycol, acetaminophen **OR** acetaminophen, albuterol sulfate HFA, HYDROcodone-homatropine      Intake/Output Summary (Last 24 hours) at 10/3/2021 1010  Last data filed at 10/3/2021 0950  Gross per 24 hour   Intake 240 ml   Output 1050 ml   Net -810 ml       Exam:    BP (!) 155/83   Pulse 90   Temp 98.7 °F (37.1 °C) (Oral)   Resp 20   Ht 6' 1\" (1.854 m)   Wt 290 lb (131.5 kg)   SpO2 (!) 89%   BMI 38.26 kg/m²     General appearance: alert, ill-appearing,    Lungs: diminished breath sounds bilaterally  Heart: regular rate and rhythm, S1, S2   Abdomen: soft, BS active  Extremities: no edema      Labs:   Recent Labs     10/01/21  1115 10/01/21  1115 10/01/21  1146 10/02/21  0600 10/03/21  0606   WBC 7.1  --   --  6.5 6.5   HGB 15.4   < > 17.7* 16.1 15.6   HCT 46.1  --   --  48.8 46.7     --   --  162 181    < > = values in this interval not displayed. Recent Labs     10/01/21  1115 10/01/21  1146 10/02/21  0600 10/03/21  0606   *  --  138 135   K 5.0*  --  4.5 5.2*   CL 95  --  97 96   CO2 26  --  29 28   BUN 20  --  22* 23*   CREATININE 1.29* 1.1 1.19 1.14   CALCIUM 9.0  --  9.2 9.1     Recent Labs     10/01/21  1115 10/02/21  0600 10/03/21  0606   AST 51* 48* 56*   ALT 64* 52* 46*   BILITOT 0.3 0.4 0.4   ALKPHOS 87 76 67     Recent Labs     10/01/21  1115   INR 1.0     Recent Labs     10/01/21  1115   TROPONINI <0.010       Urinalysis:    No results found for: Dorethia Pall, BACTERIA, RBCUA, BLOODU, SPECGRAV, GLUCOSEU    Radiology:  CTA Chest W WO  (PE study)   Final Result   1. LIMITED EXAMINATION DUE TO SUBOPTIMAL OPACIFICATION. WITHIN LIMITS EXAMINATION NO CENTRAL OR PROXIMAL PULMONARY EMBOLI.   2. PATCHY MULTIFOCAL TO COALESCENT AIRSPACE DISEASE DIFFUSELY THROUGHOUT THE LUNG PARENCHYMA. COMMONLY REPORT IMAGING FEATURES OF (COVID-19) PNEUMONIA ARE PRESENT. OTHER PROCESSES SUCH AS INFLUENZA, PNEUMONIA AND ORGANIZING PNEUMONIA AS CAN BE SEEN WITH    DRUG TOXICITY AND CONNECTIVE TISSUE DISEASE CAN CAUSE A SIMILAR IMAGING PATTERN. All CT scans at this facility use dose modulation, iterative reconstruction, and/or weight based dosing when appropriate to reduce radiation dose to as low as reasonably achievable.                     Assessment/Plan:    46 y.o. male with PMH of asthma, hypothyroidism, pituitary microadenoma, secondary hypogonadism, obesity, HARLEEN on CPAP, secondary polycythemia, insomnia who presented with:     Acute hypoxic respiratory failure  - due to COVID pneumonia  - requiring HFNC support  - on Decadron, Remdesivir, Baricitinib, Lovenox,   - ID and pulmonology following      Hyperglycemia   - no history of DM, possibly related to decadron  - HbA1c was 7.9  - ISS, hypoglycemia protocol     Asthma  - continue home regimen     HARLEEN  - continue CPAP at HS     Hypothyroidism, hypogonadism, insomnia   - continue home meds      Diet:

## 2021-10-03 NOTE — PROGRESS NOTES
Pulmonary & Critical Care Medicine Progress Note    Subjective:     No overnight events reported. He does remain on heated high flow nasal cannula. It does appear this has increased somewhat in requirement since yesterday. He is without any complaints this morning. He states he does continue to cough and expectorate secretions without difficulty.     EXAM:  General: Resting comfortably in bed, no acute distress  HEENT: Normocephalic, atraumatic, Airvo in place  Lungs : Diminished breath sounds with occasional crackles, no wheezes, no rhonchi, no respiratory distress at rest  Heart: Regular rate and rhythm  ABD: Positive bowel sounds, soft, nontender to palpation  Extremities : Warm, dry  Neuro: Awake, alert, oriented x4, no focal motor or sensory deficits appreciated  Skin: No rashes appreciated    IV:   dextrose      sodium chloride         Vitals:  BP (!) 155/83   Pulse 90   Temp 98.7 °F (37.1 °C) (Oral)   Resp 20   Ht 6' 1\" (1.854 m)   Wt 290 lb (131.5 kg)   SpO2 (!) 89%   BMI 38.26 kg/m²          Intake/Output Summary (Last 24 hours) at 10/3/2021 1149  Last data filed at 10/3/2021 0950  Gross per 24 hour   Intake 240 ml   Output 1050 ml   Net -810 ml       Medications:  Scheduled Meds:   baricitinib  4 mg Oral Daily    sodium chloride flush  10 mL IntraVENous BID    sodium chloride flush  5-40 mL IntraVENous 2 times per day    enoxaparin  30 mg SubCUTAneous BID    insulin lispro  0-6 Units SubCUTAneous TID WC    insulin lispro  0-3 Units SubCUTAneous Nightly    dexamethasone  6 mg Oral Daily    levothyroxine  75 mcg Oral Daily    clomiPHENE  50 mg Oral Daily    fluticasone  2 spray Each Nostril Daily    traZODone  50 mg Oral Nightly    budesonide-formoterol  2 puff Inhalation BID    tiotropium  2 puff Inhalation Daily    remdesivir IVPB  100 mg IntraVENous Q24H       Labs:   CBC:   Recent Labs     10/01/21  1115 10/01/21  1115 10/01/21  1146 10/02/21  0600 10/03/21  0606   WBC 7.1  -- this time he will continue with position changing and go in the prone position if able. He will continue on treatment for COVID-19.    2. COVID-19 pneumonitis-infectious disease does continue to follow. He is currently receiving remdesivir, Decadron, and baricitinib. 3. Asthma-at this time he does not have any signs or symptoms consistent with an asthma exacerbation. He will continue with his Symbicort twice daily and albuterol as needed.     Electronically signed by Meghann Zepeda DO on 10/3/2021 at 11:49 AM

## 2021-10-04 PROBLEM — J12.82 PNEUMONIA DUE TO 2019 NOVEL CORONAVIRUS: Status: ACTIVE | Noted: 2021-01-01

## 2021-10-04 NOTE — PROGRESS NOTES
Infectious Diseases Inpatient Progress Note          HISTORY OF PRESENT ILLNESS:  Follow up COVID-19 pneumonia with acute respiratory failure and hypoxia on IV remdesivir and baricitinib, well tolerated. Patient has persistent severe generalized body aches , fatigue, shortness of breath and cough, no appetite   Remains to be severely hypoxic currently on high flow oxygen, 65 L 95%    Current Medications:     baricitinib  4 mg Oral Daily    sodium chloride flush  5-40 mL IntraVENous 2 times per day    enoxaparin  30 mg SubCUTAneous BID    insulin lispro  0-6 Units SubCUTAneous TID WC    insulin lispro  0-3 Units SubCUTAneous Nightly    dexamethasone  6 mg Oral Daily    levothyroxine  75 mcg Oral Daily    clomiPHENE  50 mg Oral Daily    fluticasone  2 spray Each Nostril Daily    traZODone  50 mg Oral Nightly    budesonide-formoterol  2 puff Inhalation BID    tiotropium  2 puff Inhalation Daily    remdesivir IVPB  100 mg IntraVENous Q24H       Allergies:  Aleve [naproxen]      Review of Systems  14 system review is negative other than HPI    Physical Exam  Vitals:    10/04/21 0845 10/04/21 0900 10/04/21 0905 10/04/21 1000   BP: (!) 141/83      Pulse: 88      Resp:       Temp:       TempSrc:       SpO2: (!) 84% (!) 88% 91% 98%   Weight:       Height:         General Appearance: alert and oriented to person, place and time, well-developed and well-nourished, in no acute distress, on high flow  Skin: warm and dry, no rash. Head: normocephalic and atraumatic  Eyes: anicteric sclerae  ENT: oropharynx clear and moist with normal mucous membranes.  No oral thrush  Lungs: normal respiratory effort, diminished breath sounds bilateral lung fields with minimal wheezes  Heart normal S1-S2 no murmur  Abdomen: soft, no tenderness  No leg edema  No erythema, no tenderness      DATA:    Lab Results   Component Value Date    WBC 7.4 10/04/2021    HGB 16.3 10/04/2021    HCT 49.4 10/04/2021    MCV 83.0 10/04/2021     10/04/2021     Lab Results   Component Value Date    CREATININE 1.15 10/04/2021    BUN 25 (H) 10/04/2021     10/04/2021    K 4.7 10/04/2021    CL 99 10/04/2021    CO2 27 10/04/2021       Hepatic Function Panel:  Lab Results   Component Value Date    ALKPHOS 66 10/04/2021    ALT 41 10/04/2021    AST 37 10/04/2021    PROT 6.8 10/04/2021    BILITOT 0.5 10/04/2021    LABALBU 3.4 10/04/2021     Normal procalcitonin  Normal D-dimer  elevated hs CRP of 49.9      Impression   1. LIMITED EXAMINATION DUE TO SUBOPTIMAL OPACIFICATION. WITHIN LIMITS EXAMINATION NO CENTRAL OR PROXIMAL PULMONARY EMBOLI.   2. PATCHY MULTIFOCAL TO COALESCENT AIRSPACE DISEASE DIFFUSELY THROUGHOUT THE LUNG PARENCHYMA.  COMMONLY REPORT IMAGING FEATURES OF (COVID-19) PNEUMONIA ARE PRESENT. OTHER PROCESSES SUCH AS INFLUENZA, PNEUMONIA AND ORGANIZING PNEUMONIA AS CAN BE SEEN WITH    DRUG TOXICITY AND CONNECTIVE TISSUE DISEASE CAN CAUSE A SIMILAR IMAGING PATTERN.            IMPRESSION:    · Critical COVID-19 pneumonia  · Acute respiratory failure with severe hypoxia  · History of asthma    Patient Active Problem List   Diagnosis    Seasonal allergies    Acquired hypothyroidism    Hypogonadism, male    Hyperlipidemia    Chronic bilateral low back pain without sciatica    Mild intermittent asthma without complication    Irritable bowel syndrome with diarrhea    COVID-19       PLAN:  · 5 days remdesivir  · Decadron and anticoagulation as ordered  · Baricitinib for up to 14 days  · Follow-up CBC complete metabolic profile  · Oxygen support and weaning as tolerated  · CXR today  · Follow-up with pulmonary  · Patient was encouraged to prone  I had a lengthy discussion with the wife explaining guarded prognosis and severity of illness  Discussed with patient and spouse    Mallika Sam MD

## 2021-10-04 NOTE — PROGRESS NOTES
Hospitalist Progress Note      PCP: Carlitos Brock MD    Date of Admission: 10/1/2021    Chief Complaint:  afebrile, stable HD, placed on continues to require HFNC. this morning. Brief desat upper 80s throughout the night, fixed with lateral decubitus positioning. Complained of CPAP pressure feeling a little bit too elevated overnight compared to home settings. Medications:  Reviewed    Infusion Medications    dextrose      sodium chloride       Scheduled Medications    baricitinib  4 mg Oral Daily    sodium chloride flush  10 mL IntraVENous BID    sodium chloride flush  5-40 mL IntraVENous 2 times per day    enoxaparin  30 mg SubCUTAneous BID    insulin lispro  0-6 Units SubCUTAneous TID WC    insulin lispro  0-3 Units SubCUTAneous Nightly    dexamethasone  6 mg Oral Daily    levothyroxine  75 mcg Oral Daily    clomiPHENE  50 mg Oral Daily    fluticasone  2 spray Each Nostril Daily    traZODone  50 mg Oral Nightly    budesonide-formoterol  2 puff Inhalation BID    tiotropium  2 puff Inhalation Daily    remdesivir IVPB  100 mg IntraVENous Q24H     PRN Meds: hydrOXYzine, glucose, dextrose, glucagon (rDNA), dextrose, sodium chloride flush, sodium chloride, ondansetron **OR** ondansetron, polyethylene glycol, acetaminophen **OR** acetaminophen, albuterol sulfate HFA, HYDROcodone-homatropine      Intake/Output Summary (Last 24 hours) at 10/4/2021 0857  Last data filed at 10/3/2021 0950  Gross per 24 hour   Intake 240 ml   Output 400 ml   Net -160 ml       Exam:    BP (!) 141/83   Pulse 88   Temp 99.3 °F (37.4 °C) (Oral)   Resp 18   Ht 6' 1\" (1.854 m)   Wt 290 lb (131.5 kg)   SpO2 (!) 84% Comment: deep breathing encouraged   BMI 38.26 kg/m²     General appearance:  Obese adult male laying right lateral decubitus. Alert, ill-appearing, cooperative  Lungs: diminished breath sounds bilaterally. No coughing during exam.  Faint bilateral diffuse rhonchi.   Heart: regular rate and rhythm, S1, S2 Abdomen:  Obese, soft, BS active  Extremities: no edema  Neurologic: Grossly alert and oriented. No focal neurologic deficits appreciated. Labs:   Recent Labs     10/02/21  0600 10/03/21  0606 10/04/21  0602   WBC 6.5 6.5 7.4   HGB 16.1 15.6 16.3   HCT 48.8 46.7 49.4    181 206     Recent Labs     10/02/21  0600 10/03/21  0606 10/04/21  0602    135 138   K 4.5 5.2* 4.7   CL 97 96 99   CO2 29 28 27   BUN 22* 23* 25*   CREATININE 1.19 1.14 1.15   CALCIUM 9.2 9.1 9.1     Recent Labs     10/02/21  0600 10/03/21  0606 10/04/21  0602   AST 48* 56* 37   ALT 52* 46* 41   BILITOT 0.4 0.4 0.5   ALKPHOS 76 67 66     Recent Labs     10/01/21  1115   INR 1.0     Recent Labs     10/01/21  1115   TROPONINI <0.010       Urinalysis:    No results found for: Sara Learn, BACTERIA, RBCUA, BLOODU, SPECGRAV, GLUCOSEU    Radiology:  CTA Chest W WO  (PE study)   Final Result   1. LIMITED EXAMINATION DUE TO SUBOPTIMAL OPACIFICATION. WITHIN LIMITS EXAMINATION NO CENTRAL OR PROXIMAL PULMONARY EMBOLI.   2. PATCHY MULTIFOCAL TO COALESCENT AIRSPACE DISEASE DIFFUSELY THROUGHOUT THE LUNG PARENCHYMA. COMMONLY REPORT IMAGING FEATURES OF (COVID-19) PNEUMONIA ARE PRESENT. OTHER PROCESSES SUCH AS INFLUENZA, PNEUMONIA AND ORGANIZING PNEUMONIA AS CAN BE SEEN WITH    DRUG TOXICITY AND CONNECTIVE TISSUE DISEASE CAN CAUSE A SIMILAR IMAGING PATTERN. All CT scans at this facility use dose modulation, iterative reconstruction, and/or weight based dosing when appropriate to reduce radiation dose to as low as reasonably achievable.                     Assessment/Plan:    46 y.o. male with PMH of asthma, hypothyroidism, pituitary microadenoma, secondary hypogonadism, obesity, HARLEEN on CPAP, secondary polycythemia, insomnia who presented with:     Acute hypoxic respiratory failure  - due to COVID pneumonia  - requiring HFNC support  - on Decadron, Remdesivir, Baricitinib, Lovenox,   - ID and pulmonology consulted      Hyperglycemia   - no history of DM, possibly related to decadron  - follow [id number]  - ISS, hypoglycemia protocol     Elevated Cr  - monitor renal function     Asthma  - continue home regimen     HARLEEN  - continue CPAP. Uses PEEP 7-8 at home, per patient.     Hypothyroidism, hypogonadism, insomnia   - continue home meds           Diet: ADULT DIET; Regular; 5 carb choices (75 gm/meal);  Safety Tray; Safety Tray (Disposables)    Code Status: Full Code          Electronically signed by Nick Caldera DO on 10/4/2021 at 8:57 AM

## 2021-10-04 NOTE — CARE COORDINATION
Pneumonia booklet, zone pamphlet and Covid information delivered to patient by primary nurse due to isolation status.    Electronically signed by Kallie Brumfield RN on 10/4/2021 at 2:26 PM

## 2021-10-04 NOTE — PROGRESS NOTES
Patient allowed to have his wife (only) to visit 1 hour daily during visiting hours. His spouse must use appropriate PPE before entering the room. Visit took place today.

## 2021-10-05 NOTE — PROGRESS NOTES
General appearance:  Obese adult male laying supine wearing BiPAP mask. Alert, ill-appearing, cooperative. Appears fatigued. Lungs: diminished breath sounds bilaterally. No coughing during exam.  Unchanged faint bilateral diffuse rhonchi. Heart: Regular rate and rhythm, S1, S2. No murmur appreciated. Abdomen:  Obese, soft, BS active  Extremities: No edema  Neurologic: Grossly alert and oriented. No focal neurologic deficits appreciated. Labs:   Recent Labs     10/03/21  0606 10/04/21  0602 10/05/21  0630   WBC 6.5 7.4 10.4   HGB 15.6 16.3 15.9   HCT 46.7 49.4 48.1    206 261     Recent Labs     10/03/21  0606 10/04/21  0602 10/05/21  0629    138 137   K 5.2* 4.7 4.7   CL 96 99 97   CO2 28 27 27   BUN 23* 25* 24*   CREATININE 1.14 1.15 1.13   CALCIUM 9.1 9.1 8.7     Recent Labs     10/03/21  0606 10/04/21  0602 10/05/21  0629   AST 56* 37 37   ALT 46* 41 37   BILITOT 0.4 0.5 0.7   ALKPHOS 67 66 65     No results for input(s): INR in the last 72 hours. No results for input(s): Maria Esther Shanae in the last 72 hours. Urinalysis:    No results found for: Sara Thao, BACTERIA, Tonie Marie, [place] [address]    Radiology:  XR CHEST PORTABLE   Final Result      Bilateral pneumonia appears progressed when compared to prior chest radiograph of September 29, 2021, possibly in part secondary to suboptimal inspiration. CTA Chest W WO  (PE study)   Final Result   1. LIMITED EXAMINATION DUE TO SUBOPTIMAL OPACIFICATION. WITHIN LIMITS EXAMINATION NO CENTRAL OR PROXIMAL PULMONARY EMBOLI.   2. PATCHY MULTIFOCAL TO COALESCENT AIRSPACE DISEASE DIFFUSELY THROUGHOUT THE LUNG PARENCHYMA. COMMONLY REPORT IMAGING FEATURES OF (COVID-19) PNEUMONIA ARE PRESENT. OTHER PROCESSES SUCH AS INFLUENZA, PNEUMONIA AND ORGANIZING PNEUMONIA AS CAN BE SEEN WITH    DRUG TOXICITY AND CONNECTIVE TISSUE DISEASE CAN CAUSE A SIMILAR IMAGING PATTERN.           All CT scans at this facility use dose modulation, iterative reconstruction, and/or weight based dosing when appropriate to reduce radiation dose to as low as reasonably achievable. Assessment/Plan:    46 y.o. male with PMH of asthma, hypothyroidism, pituitary microadenoma, secondary hypogonadism, obesity, HARLEEN on CPAP, secondary polycythemia, insomnia who presented with:     Acute hypoxic respiratory failure  - due to COVID pneumonia  - requiring HFNC/BiPAP support  - on Decadron, Remdesivir, Baricitinib, Lovenox,   - ID and pulmonology consulted     Chest pressure reported 10/5  - Stat EKG ordered, awaiting results  - Add on troponin for morning labs, awaiting results  -Patient already on Lovenox     Hyperglycemia   - no history of DM, likely related to decadron utilization  - follow HbA1c result  - ISS, hypoglycemia protocol     Elevated Cr  - monitor renal function     Asthma  - continue home regimen     HARLEEN  - continue CPAP/BiPAP nightly and as needed. Uses PEEP 7-8 at home, per patient.     Hypothyroidism, hypogonadism, insomnia   - continue home meds           Diet: ADULT DIET; Regular; 5 carb choices (75 gm/meal);  Safety Tray; Safety Tray (Disposables)    Code Status: Full Code          Electronically signed by David Rueda DO on 10/5/2021 at 9:50 AM

## 2021-10-05 NOTE — PROGRESS NOTES
INPATIENT PROGRESS NOTES    PATIENT NAME: Elliot Bai  MRN: [id number]  SERVICE DATE:  October 4, 2021   SERVICE TIME:  9:29 PM      PRIMARY SERVICE: Pulmonary Disease    CHIEF COMPLAIN: COVID-19 pneumonia, acute respiratory failure with hypoxia, sleep apnea      INTERVAL HPI: Patient seen and examined at bedside, Interval Notes, orders reviewed. Nursing notes noted  Patient is on CPAP therapy. O2 saturation 94%. Discussed with RN he was having O2 desaturation. He has been on 65 L and 95% FiO2 with high flow when he is off CPAP. He is currently on IV remdesivir and baricitinib. He said he is feeling somewhat better than yesterday. No chest pain or pleuritic pain. No nausea vomiting diarrhea or abdominal pain. No fever or chills. OBJECTIVE    Body mass index is 38.26 kg/m². PHYSICAL EXAM:  Vitals:  BP (!) 146/95   Pulse 94   Temp 98.8 °F (37.1 °C) (Oral)   Resp 20   Ht 6' 1\" (1.854 m)   Wt 290 lb (131.5 kg)   SpO2 94%   BMI 38.26 kg/m²   General: Alert, awake . comfortable in bed, No distress. Head: Atraumatic , Normocephalic   Eyes: PERRL. No sclera icterus. No conjunctival injection. No discharge   ENT: No nasal  discharge. Pharynx clear. Neck:  Trachea midline. No thyromegaly, no JVD, No cervical adenopathy. Chest : Bilaterally symmetrical ,Normal effort,  No accessory muscle use  Lung : . Fair BS bilateral, decreased BS at bases. No Rales. No wheezing. No rhonchi. Heart[de-identified] Normal  rate. Regular rhythm. No mumur ,  Rub or gallop  ABD: Non-tender. Non-distended. No masses. No organmegaly. Normal bowel sounds. No hernia.   Ext : No Pitting both leg , No Cyanosis No clubbing  Neuro: no focal weakness          DATA:   Recent Labs     10/03/21  0606 10/04/21  0602   WBC 6.5 7.4   HGB 15.6 16.3   HCT 46.7 49.4   MCV 81.4 83.0    206     Recent Labs     10/03/21  0606 10/03/21  0606 10/04/21  0602     --  138   K 5.2*  --  4.7   CL 96  --  99   CO2 28  --  27   BUN 23*  --  25* CREATININE 1.14  --  1.15   GLUCOSE 197*  --  196*   CALCIUM 9.1  --  9.1   PROT 6.7  --  6.8   LABALBU 3.1*  --  3.4*   BILITOT 0.4  --  0.5   ALKPHOS 67  --  66   AST 56*  --  37   ALT 46*   < > 41   LABGLOM >60.0   < > >60.0   GFRAA >60.0   < > >60.0   GLOB 3.6*   < > 3.4    < > = values in this interval not displayed. MV Settings:     FiO2 : 90 %    No results for input(s): PHART, GDU1QDP, PO2ART, UXT5ALZ, BEART, K8LNFRRN in the last 72 hours. O2 Device: Heated high flow cannula  O2 Flow Rate (L/min): 55 L/min    ADULT DIET; Regular; 5 carb choices (75 gm/meal); Safety Tray; Safety Tray (Disposables)     MEDICATIONS during current hospitalization:    Continuous Infusions:   dextrose      sodium chloride         Scheduled Meds:   baricitinib  4 mg Oral Daily    sodium chloride flush  5-40 mL IntraVENous 2 times per day    enoxaparin  30 mg SubCUTAneous BID    insulin lispro  0-6 Units SubCUTAneous TID WC    insulin lispro  0-3 Units SubCUTAneous Nightly    dexamethasone  6 mg Oral Daily    levothyroxine  75 mcg Oral Daily    clomiPHENE  50 mg Oral Daily    fluticasone  2 spray Each Nostril Daily    traZODone  50 mg Oral Nightly    budesonide-formoterol  2 puff Inhalation BID    tiotropium  2 puff Inhalation Daily    remdesivir IVPB  100 mg IntraVENous Q24H       PRN Meds:hydrOXYzine, glucose, dextrose, glucagon (rDNA), dextrose, sodium chloride flush, sodium chloride, ondansetron **OR** ondansetron, polyethylene glycol, acetaminophen **OR** acetaminophen, albuterol sulfate HFA, HYDROcodone-homatropine    Radiology  CTA Chest W WO  (PE study)    Result Date: 10/1/2021  The EXAMINATION: CT scan of the chest with contrast (pulmonary embolism protocol) INDICATION: Chest pain and shortness of breath.  COMPARISON: None TECHNIQUE: Helical CT was performed through the chest utilizing 100 cc of 370 intravenous contrast.  Images were obtained with bolus tracking in order to opacify the pulmonary arteries. Both MIP and 3D volume rendered reconstructions were performed. FINDINGS: There is is a limited examination due to suboptimal opacification. Within limits examination no central or proximal pulmonary emboli. There is patchy multifocal to coalescent airspace disease diffusely throughout the lung parenchyma there is some scattered areas of atelectasis. No pleural effusions. No pneumothoraces. There is periaortic, pretracheal, parahilar and subcarinal adenopathy. Field-of-view visualized abdominal contents are unremarkable. There is multilevel degenerative changes of the thoracic spine superimposed upon a mild to moderate dorsal kyphosis     1. LIMITED EXAMINATION DUE TO SUBOPTIMAL OPACIFICATION. WITHIN LIMITS EXAMINATION NO CENTRAL OR PROXIMAL PULMONARY EMBOLI. 2. PATCHY MULTIFOCAL TO COALESCENT AIRSPACE DISEASE DIFFUSELY THROUGHOUT THE LUNG PARENCHYMA. COMMONLY REPORT IMAGING FEATURES OF (COVID-19) PNEUMONIA ARE PRESENT. OTHER PROCESSES SUCH AS INFLUENZA, PNEUMONIA AND ORGANIZING PNEUMONIA AS CAN BE SEEN WITH DRUG TOXICITY AND CONNECTIVE TISSUE DISEASE CAN CAUSE A SIMILAR IMAGING PATTERN. All CT scans at this facility use dose modulation, iterative reconstruction, and/or weight based dosing when appropriate to reduce radiation dose to as low as reasonably achievable. XR CHEST PORTABLE    Result Date: 10/4/2021  Exam: XR CHEST PORTABLE History: Worsening hypoxia. Technique: AP portable view of the chest obtained. Comparison: CT chest October 1, 2021 and portable chest radiograph September 29, 2021 Findings: Suboptimal inspiration. The cardiomediastinal silhouette is within normal limits. Bilateral airspace opacities are again identified and appear mildly worsened, possibly secondary to suboptimal inspiration. No pneumothorax or pleural effusion. No acute osseous abnormality.      Bilateral pneumonia appears progressed when compared to prior chest radiograph of September 29, 2021, possibly in part secondary to suboptimal inspiration. XR CHEST PORTABLE    Result Date: 9/29/2021  Exam: XR CHEST PORTABLE History:  sob Technique: AP portable view of the chest obtained. Comparison: none Chest x-ray portable Findings: The cardiomediastinal silhouette is within normal limits. There are mild increased markings throughout both lungs. . Bones of the thorax appear intact. Increased pulmonary markings throughout both lungs represent pulmonary edema, fluid overload or viral infection. IMPRESSION AND SUGGESTION:  1. Severe COVID-19 pneumonia  2. Acute respiratory failure with severe hypoxia. 3. History of asthma  4. Sleep apnea on CPAP    Continue high flow O2 and CPAP alternatively. Keep saturation 92% or above. Continue Decadron, remdesivir, basilar tape and Lovenox. Chest x-ray today shows bilateral pneumonia appear progressed compared to prior chest x-ray on September 29, 2021, suboptimal inspiration. NOTE: This report was transcribed using voice recognition software. Every effort was made to ensure accuracy; however, inadvertent computerized transcription errors may be present.       Electronically signed by [id number] Sharon Ramos MD, FCCP on 10/4/2021 at 9:29 PM

## 2021-10-05 NOTE — PROGRESS NOTES
Pt placed on Cpap did not tolerate -86%. Pt placed back on heated high flow- 95%.  Updated wife, Will cont to monitor

## 2021-10-05 NOTE — CARE COORDINATION
INTERDISCIPLINARY ROUNDING    October 5, 2021 at 1000 AM EDT    Anticipated Discharge Date: TBD      Updates/ Report:     - Patient's respiratory status - unable to tolerate decrease to HHF O2   - Quickly desats to 80's when BiPAP is removed even for a short time   - Per physician - CXR 10/4 shows worsening pneumonia   - DC plan pending progress / response to treatment    Anticipated Discharge Disposition: Per notes, home with spouse. Will need home O2. Patient Mobility or PT/OT ordered: Not medically safe at this time. Readmission Risk              Risk of Unplanned Readmission:  11         Discussed patient goal for the day, patient clinical progression, and barriers to discharge. The following Goal(s) of the Day/Commitment(s) have been identified:  Outcomes Documented in Discipline-specific Documentation.     Maranda Juan RN  October 5, 2021

## 2021-10-05 NOTE — PROGRESS NOTES
Infectious Diseases Inpatient Progress Note          HISTORY OF PRESENT ILLNESS:  Follow up COVID-19 pneumonia with acute respiratory failure and hypoxia on IV remdesivir and baricitinib, well tolerated. Patient has persistent severe generalized body aches , fatigue, shortness of breath and cough, no appetite   Remains to be severely hypoxic currently on BIPAP, quickly desats to 80s when BiPAP is removed even for a short period of time    Current Medications:     baricitinib  4 mg Oral Daily    sodium chloride flush  5-40 mL IntraVENous 2 times per day    enoxaparin  30 mg SubCUTAneous BID    insulin lispro  0-6 Units SubCUTAneous TID WC    insulin lispro  0-3 Units SubCUTAneous Nightly    dexamethasone  6 mg Oral Daily    levothyroxine  75 mcg Oral Daily    clomiPHENE  50 mg Oral Daily    fluticasone  2 spray Each Nostril Daily    traZODone  50 mg Oral Nightly    budesonide-formoterol  2 puff Inhalation BID    tiotropium  2 puff Inhalation Daily    remdesivir IVPB  100 mg IntraVENous Q24H       Allergies:  Aleve [naproxen]      Review of Systems  14 system review is negative other than HPI    Physical Exam  Vitals:    10/05/21 0747 10/05/21 0906 10/05/21 0908 10/05/21 1153   BP: (!) 156/96   (!) 155/90   Pulse: 89 95  88   Resp: 23   28   Temp: 98.6 °F (37 °C)      TempSrc: Axillary      SpO2: 90% 92% 90% 92%   Weight:       Height:         General Appearance: alert and oriented to person, place and time, well-developed and well-nourished, in no acute distress, on BIPAP  Skin: warm and dry, no rash. Head: normocephalic and atraumatic  Eyes: anicteric sclerae  ENT: oropharynx clear and moist with normal mucous membranes.  No oral thrush  Lungs: normal respiratory effort, diminished breath sounds bilateral lung fields with minimal wheezes  Heart normal S1-S2 no murmur  Abdomen: soft, no tenderness  No leg edema  No erythema, no tenderness      DATA:    Lab Results   Component Value Date    WBC 10.4 10/05/2021    HGB 15.9 10/05/2021    HCT 48.1 10/05/2021    MCV 81.3 10/05/2021     10/05/2021     Lab Results   Component Value Date    CREATININE 1.13 10/05/2021    BUN 24 (H) 10/05/2021     10/05/2021    K 4.7 10/05/2021    CL 97 10/05/2021    CO2 27 10/05/2021       Hepatic Function Panel:  Lab Results   Component Value Date    ALKPHOS 65 10/05/2021    ALT 37 10/05/2021    AST 37 10/05/2021    PROT 6.6 10/05/2021    BILITOT 0.7 10/05/2021    LABALBU 3.4 10/05/2021     Normal procalcitonin  Normal D-dimer     Impression       Bilateral pneumonia appears progressed when compared to prior chest radiograph of September 29, 2021, possibly in part secondary to suboptimal inspiration.         10/4/2021  6:29 PM - Benson, [facility] Incoming Lab Results From [facility]    Component Value Ref Range & Units Status Collected Lab   Ferritin 2,022.0High   30.0 - 400.0 ng/mL Final             IMPRESSION:    · Critical COVID-19 pneumonia, worsening  · Acute respiratory failure with severe hypoxia  · History of asthma    Patient Active Problem List   Diagnosis    Seasonal allergies    Acquired hypothyroidism    Hypogonadism, male    Hyperlipidemia    Chronic bilateral low back pain without sciatica    Intermittent asthma    Irritable bowel syndrome with diarrhea    Pneumonia due to 2019 novel coronavirus    Acute respiratory failure with hypoxia (HCC)       PLAN:  · 10 days remdesivir  · Decadron and anticoagulation as ordered  · Switch baricitinib to Actemra if patient is agreeable  · Follow-up CBC complete metabolic profile  · Oxygen support and weaning as tolerated  · Follow-up with pulmonary  ·   I had a lengthy discussion with the wife explaining guarded prognosis and severity of illness  Wife and patient are agreeable to take Actemra, I explained to them his emergency use authorization. It may lower the immune system.    Discussed with patient and spouse    Mallika Sam MD

## 2021-10-06 NOTE — PROGRESS NOTES
Hospitalist Progress Note      PCP: Hilary Joe MD    Date of Admission: 10/1/2021    Chief Complaint:    Afebrile, hemodynamically stable. Patient having worsening respiratory status overnight, transferred to ICU early this morning on BiPAP with FiO2 increased to 100%, with pressure settings of 12/6. Pulm/Crit team following now, spoke with spouse, discussed possible need for intubation if any further respiratory decompensation. Medications:  Reviewed    Infusion Medications    lactated ringers 125 mL/hr at 10/06/21 0602    dextrose      sodium chloride       Scheduled Medications    sodium chloride flush  5-40 mL IntraVENous 2 times per day    enoxaparin  30 mg SubCUTAneous BID    insulin lispro  0-6 Units SubCUTAneous TID WC    insulin lispro  0-3 Units SubCUTAneous Nightly    dexamethasone  6 mg Oral Daily    levothyroxine  75 mcg Oral Daily    clomiPHENE  50 mg Oral Daily    fluticasone  2 spray Each Nostril Daily    traZODone  50 mg Oral Nightly    budesonide-formoterol  2 puff Inhalation BID    tiotropium  2 puff Inhalation Daily    remdesivir IVPB  100 mg IntraVENous Q24H     PRN Meds: hydrOXYzine, glucose, dextrose, glucagon (rDNA), dextrose, sodium chloride flush, sodium chloride, ondansetron **OR** ondansetron, polyethylene glycol, acetaminophen **OR** acetaminophen, albuterol sulfate HFA, HYDROcodone-homatropine      Intake/Output Summary (Last 24 hours) at 10/6/2021 1542  Last data filed at 10/6/2021 0610  Gross per 24 hour   Intake 1680 ml   Output    Net 1680 ml       Exam:    BP (!) 160/94   Pulse 101   Temp 98.6 °F (37 °C)   Resp 19   Ht 6' 1\" (1.854 m)   Wt 290 lb (131.5 kg)   SpO2 100%   BMI 38.26 kg/m²   In setting of COVID-19 pandemic and to prevent further spread of infection, physical exam comprised of visual components, telemetry, and discussion of patient with other active care team members.   General appearance:  Obese adult male laying left lateral decubitus BiPAP mask. Alert, ill-appearing. Appears fatigued. Lungs: Moderate tachypnea on BiPAP at 12/6 with FiO2 100%, no coughing noted during visual exam.  Heart:  RRR on telemetry, with heart rate in 70s at time of exam.  No vasopressors. Abdomen:  Obese  Extremities: No edema noted. Neurologic: Alert. No focal neurologic deficits appreciated. Served to move all 4 extremities      Labs:   Recent Labs     10/04/21  0602 10/05/21  0630 10/06/21  0942   WBC 7.4 10.4 8.3   HGB 16.3 15.9 15.8   HCT 49.4 48.1 47.7    261 284     Recent Labs     10/04/21  0602 10/05/21  0629 10/06/21  0549    137 140   K 4.7 4.7 4.6   CL 99 97 100   CO2 27 27 26   BUN 25* 24* 23*   CREATININE 1.15 1.13 1.08   CALCIUM 9.1 8.7 8.8     Recent Labs     10/04/21  0602 10/05/21  0629 10/06/21  0549   AST 37 37 34   ALT 41 37 30   BILITOT 0.5 0.7 0.6   ALKPHOS 66 65 64     No results for input(s): INR in the last 72 hours. Recent Labs     10/05/21  0630   TROPONINI <0.010       Urinalysis:    No results found for: [place], BACTERIA, [place], GLUCOSEU    Radiology:  XR CHEST PORTABLE   Final Result      Bilateral pneumonia appears progressed when compared to prior chest radiograph of September 29, 2021, possibly in part secondary to suboptimal inspiration. CTA Chest W WO  (PE study)   Final Result   1. LIMITED EXAMINATION DUE TO SUBOPTIMAL OPACIFICATION. WITHIN LIMITS EXAMINATION NO CENTRAL OR PROXIMAL PULMONARY EMBOLI.   2. PATCHY MULTIFOCAL TO COALESCENT AIRSPACE DISEASE DIFFUSELY THROUGHOUT THE LUNG PARENCHYMA. COMMONLY REPORT IMAGING FEATURES OF (COVID-19) PNEUMONIA ARE PRESENT. OTHER PROCESSES SUCH AS INFLUENZA, PNEUMONIA AND ORGANIZING PNEUMONIA AS CAN BE SEEN WITH    DRUG TOXICITY AND CONNECTIVE TISSUE DISEASE CAN CAUSE A SIMILAR IMAGING PATTERN.           All CT scans at this facility use dose modulation, iterative reconstruction, and/or weight based dosing when appropriate to reduce radiation dose to as low as reasonably achievable. Assessment/Plan:    46 y.o. male with PMH of asthma, hypothyroidism, pituitary microadenoma, secondary hypogonadism, obesity, HARLEEN on CPAP, secondary polycythemia, insomnia who presented with:     Acute hypoxic respiratory failure  - due to COVID pneumonia  - requiring BiPAP support  - on Decadron, Remdesivir, Lovenox  -Tocilizumab per ID  - ID and pulmonology consulted   - Trending inflammatory markers, including ferritin. - Transferred to ICU the morning of 10/6/2021 in setting of progressive respiratory decompensation, currently on BiPAP with 12/6 and FiO2 100%. Family aware of need for possible intubation if worsening respiratory status. Hyperglycemia   - no history of DM, likely related to decadron utilization  - ISS, hypoglycemia protocol     Elevated Cr  - monitor renal function     Asthma  - continue home regimen     HARLEEN  -Currently continuous rescue BiPAP at present. Hypothyroidism, hypogonadism, insomnia   - continue/hold home meds as ordered           Diet: ADULT DIET; Regular; 5 carb choices (75 gm/meal);  Safety Tray; Safety Tray (Disposables)    Code Status: Full Code          Electronically signed by Dickson Norris DO on 10/6/2021 at 3:42 PM

## 2021-10-06 NOTE — PROGRESS NOTES
LABALBU 3.4*  --  3.4*   BILITOT 0.5  --  0.7   ALKPHOS 66  --  65   AST 37  --  37   ALT 41   < > 37   LABGLOM >60.0   < > >60.0   GFRAA >60.0   < > >60.0   GLOB 3.4   < > 3.2    < > = values in this interval not displayed. MV Settings:     FiO2 : 100 %    No results for input(s): PHART, EAI0AGL, PO2ART, RUA1DBW, BEART, W3KFYVUZ in the last 72 hours. O2 Device: PAP (positive airway pressure)  O2 Flow Rate (L/min): 55 L/min    ADULT DIET; Regular; 5 carb choices (75 gm/meal); Safety Tray; Safety Tray (Disposables)     MEDICATIONS during current hospitalization:    Continuous Infusions:   lactated ringers 125 mL/hr at 10/05/21 1148    dextrose      sodium chloride         Scheduled Meds:   sodium chloride flush  5-40 mL IntraVENous 2 times per day    enoxaparin  30 mg SubCUTAneous BID    insulin lispro  0-6 Units SubCUTAneous TID WC    insulin lispro  0-3 Units SubCUTAneous Nightly    dexamethasone  6 mg Oral Daily    levothyroxine  75 mcg Oral Daily    clomiPHENE  50 mg Oral Daily    fluticasone  2 spray Each Nostril Daily    traZODone  50 mg Oral Nightly    budesonide-formoterol  2 puff Inhalation BID    tiotropium  2 puff Inhalation Daily    remdesivir IVPB  100 mg IntraVENous Q24H       PRN Meds:hydrOXYzine, glucose, dextrose, glucagon (rDNA), dextrose, sodium chloride flush, sodium chloride, ondansetron **OR** ondansetron, polyethylene glycol, acetaminophen **OR** acetaminophen, albuterol sulfate HFA, HYDROcodone-homatropine    Radiology  CTA Chest W WO  (PE study)    Result Date: 10/1/2021  The EXAMINATION: CT scan of the chest with contrast (pulmonary embolism protocol) INDICATION: Chest pain and shortness of breath. COMPARISON: None TECHNIQUE: Helical CT was performed through the chest utilizing 100 cc of 370 intravenous contrast.  Images were obtained with bolus tracking in order to opacify the pulmonary arteries. Both MIP and 3D volume rendered reconstructions were performed. FINDINGS: There is is a limited examination due to suboptimal opacification. Within limits examination no central or proximal pulmonary emboli. There is patchy multifocal to coalescent airspace disease diffusely throughout the lung parenchyma there is some scattered areas of atelectasis. No pleural effusions. No pneumothoraces. There is periaortic, pretracheal, parahilar and subcarinal adenopathy. Field-of-view visualized abdominal contents are unremarkable. There is multilevel degenerative changes of the thoracic spine superimposed upon a mild to moderate dorsal kyphosis     1. LIMITED EXAMINATION DUE TO SUBOPTIMAL OPACIFICATION. WITHIN LIMITS EXAMINATION NO CENTRAL OR PROXIMAL PULMONARY EMBOLI. 2. PATCHY MULTIFOCAL TO COALESCENT AIRSPACE DISEASE DIFFUSELY THROUGHOUT THE LUNG PARENCHYMA. COMMONLY REPORT IMAGING FEATURES OF (COVID-19) PNEUMONIA ARE PRESENT. OTHER PROCESSES SUCH AS INFLUENZA, PNEUMONIA AND ORGANIZING PNEUMONIA AS CAN BE SEEN WITH DRUG TOXICITY AND CONNECTIVE TISSUE DISEASE CAN CAUSE A SIMILAR IMAGING PATTERN. All CT scans at this facility use dose modulation, iterative reconstruction, and/or weight based dosing when appropriate to reduce radiation dose to as low as reasonably achievable. XR CHEST PORTABLE    Result Date: 10/4/2021  Exam: XR CHEST PORTABLE History: Worsening hypoxia. Technique: AP portable view of the chest obtained. Comparison: CT chest October 1, 2021 and portable chest radiograph September 29, 2021 Findings: Suboptimal inspiration. The cardiomediastinal silhouette is within normal limits. Bilateral airspace opacities are again identified and appear mildly worsened, possibly secondary to suboptimal inspiration. No pneumothorax or pleural effusion. No acute osseous abnormality. Bilateral pneumonia appears progressed when compared to prior chest radiograph of September 29, 2021, possibly in part secondary to suboptimal inspiration.      XR CHEST PORTABLE    Result Date: 9/29/2021  Exam: XR CHEST PORTABLE History:  sob Technique: AP portable view of the chest obtained. Comparison: none Chest x-ray portable Findings: The cardiomediastinal silhouette is within normal limits. There are mild increased markings throughout both lungs. . Bones of the thorax appear intact. Increased pulmonary markings throughout both lungs represent pulmonary edema, fluid overload or viral infection. IMPRESSION AND SUGGESTION:  1. Severe COVID-19 pneumonia  2. Acute respiratory failure with severe hypoxia. 3. History of asthma  4. Sleep apnea on CPAP at home    Patient is on high flow with 100% FiO2 and BiPAP therapy. His O2 saturation dropped in 80s when he is off BiPAP. Continue Decadron, IV remdesivir, Actemra given. On bronchodilator therapy with Spiriva and Symbicort. Discussed with patient's wife and gave update if condition worsen need intubation and vent support. NOTE: This report was transcribed using voice recognition software. Every effort was made to ensure accuracy; however, inadvertent computerized transcription errors may be present.       Electronically signed by Andris Essex, MD, [facility] on 10/5/2021 at 9:47 PM

## 2021-10-06 NOTE — PROGRESS NOTES
Infectious Diseases Inpatient Progress Note          HISTORY OF PRESENT ILLNESS:  Follow up COVID-19 pneumonia with acute respiratory failure and hypoxia on IV remdesivir, status post tocilizumab, well tolerated. Patient has persistent severe generalized body aches , fatigue, shortness of breath and cough, no appetite   Remains to be severely hypoxic currently on BIPAP, quickly desats to 80s when BiPAP is removed even for a short period of time. Was tachypneic earlier  Very weak  Neck and low back pain from laying in bed  Unable to prone  Current Medications:     sodium chloride flush  5-40 mL IntraVENous 2 times per day    enoxaparin  30 mg SubCUTAneous BID    insulin lispro  0-6 Units SubCUTAneous TID WC    insulin lispro  0-3 Units SubCUTAneous Nightly    dexamethasone  6 mg Oral Daily    levothyroxine  75 mcg Oral Daily    clomiPHENE  50 mg Oral Daily    fluticasone  2 spray Each Nostril Daily    traZODone  50 mg Oral Nightly    budesonide-formoterol  2 puff Inhalation BID    tiotropium  2 puff Inhalation Daily    remdesivir IVPB  100 mg IntraVENous Q24H       Allergies:  Aleve [naproxen]      Review of Systems  14 system review is negative other than HPI    Physical Exam  Vitals:    10/06/21 0517 10/06/21 0713 10/06/21 0726 10/06/21 0759   BP: (!) 142/123  (!) 146/90    Pulse: 79  70    Resp: 20 (!) 40 18    Temp: 99.1 °F (37.3 °C)   98.6 °F (37 °C)   TempSrc: Axillary  Axillary Axillary   SpO2: 90%  92%    Weight:       Height:         General Appearance: alert and oriented to person, place and time, well-developed and well-nourished, in no acute distress, on BIPAP  Skin: warm and dry, no rash. Head: normocephalic and atraumatic  Eyes: anicteric sclerae  ENT: oropharynx clear and moist with normal mucous membranes.  No oral thrush  Lungs: normal respiratory effort, diminished breath sounds bilateral lung fields with minimal wheezes  Heart normal S1-S2 no murmur  Abdomen: soft, no tenderness  No

## 2021-10-06 NOTE — PROGRESS NOTES
INPATIENT PULMONARY PROGRESS NOTES    PATIENT NAME: Murtaza Byers  MRN: [id number]  SERVICE DATE:  October 6, 2021   SERVICE TIME:  11:43 AM      PRIMARY SERVICE: Pulmonary Disease    CHIEF COMPLAIN: COVID-19 pneumonia. INTERVAL HPI: Patient seen and examined at bedside, Interval Notes, orders reviewed. Nursing notes noted  Patient was seen earlier today. Wife in room. Discussed with wife at bedside. Currently on BiPAP therapy 12/6 with 100% FiO2. . Oxygen saturation ranged from 88 to 92%. Discussed with respiratory therapist he is respiratory rate was high in 40s earlier but currently he is in mid 25s. His wife said he received hydroxyzine 25 mg which he has as needed for anxiety help him to calm down. He takes BiPAP off to cough out mucus and his O2 sat drops in mid 80s. Recommend to transfer him to ICU for close observation since he may need intubation and vent support. Discussed with wife and gave update. She is reluctant to send him to ICU but I advised that he should be observed in ICU due to his critical condition which can even worsen at any time. No fever or chills. No nausea, vomiting or diarrhea. OBJECTIVE    Body mass index is 38.26 kg/m². PHYSICAL EXAM:  Vitals:  BP (!) 146/90   Pulse 70   Temp 98.6 °F (37 °C) (Axillary)   Resp 18   Ht 6' 1\" (1.854 m)   Wt 290 lb (131.5 kg)   SpO2 92%   BMI 38.26 kg/m²   General: He is sleeping on BiPAP therapy. comfortable in bed, No distress. Head: Atraumatic , Normocephalic   Eyes: PERRL. No sclera icterus. No conjunctival injection. No discharge   ENT: No nasal  discharge. Pharynx clear. Neck:  Trachea midline. No thyromegaly, no JVD, No cervical adenopathy. Chest : Bilaterally symmetrical ,Normal effort,  No accessory muscle use  Lung : . Fair BS bilateral, decreased BS at bases. No Rales. No wheezing. No rhonchi. Heart[de-identified] Normal  rate. Regular rhythm. No mumur ,  Rub or gallop  ABD: Non-tender. Non-distended. No masses.  No You may receive a survey regarding the care you received during your visit. Your input is valuable to us. We encourage you to complete and return your survey. We hope you will choose us in the future for your healthcare needs. organmegaly. Normal bowel sounds. No hernia. Ext : No Pitting both leg , No Cyanosis No clubbing  Neuro: no focal weakness          DATA:   Recent Labs     10/05/21  0630 10/06/21  0942   WBC 10.4 8.3   HGB 15.9 15.8   HCT 48.1 47.7   MCV 81.3 82.0    284     Recent Labs     10/05/21  0629 10/05/21  0629 10/06/21  0549     --  140   K 4.7  --  4.6   CL 97  --  100   CO2 27  --  26   BUN 24*  --  23*   CREATININE 1.13  --  1.08   GLUCOSE 169*  --  161*   CALCIUM 8.7  --  8.8   PROT 6.6  --  6.3   LABALBU 3.4*  --  3.1*   BILITOT 0.7  --  0.6   ALKPHOS 65  --  64   AST 37  --  34   ALT 37   < > 30   LABGLOM >60.0   < > >60.0   GFRAA >60.0   < > >60.0   GLOB 3.2   < > 3.2    < > = values in this interval not displayed. MV Settings:     FiO2 : 100 %    No results for input(s): PHART, LGJ1AJC, PO2ART, RYA4LUT, BEART, W8USWZHP in the last 72 hours. O2 Device: Venturi-mask  O2 Flow Rate (L/min): 12 L/min    ADULT DIET; Regular; 5 carb choices (75 gm/meal);  Safety Tray; Safety Tray (Disposables)     MEDICATIONS during current hospitalization:    Continuous Infusions:   lactated ringers 125 mL/hr at 10/06/21 0602    dextrose      sodium chloride         Scheduled Meds:   sodium chloride flush  5-40 mL IntraVENous 2 times per day    enoxaparin  30 mg SubCUTAneous BID    insulin lispro  0-6 Units SubCUTAneous TID WC    insulin lispro  0-3 Units SubCUTAneous Nightly    dexamethasone  6 mg Oral Daily    levothyroxine  75 mcg Oral Daily    clomiPHENE  50 mg Oral Daily    fluticasone  2 spray Each Nostril Daily    traZODone  50 mg Oral Nightly    budesonide-formoterol  2 puff Inhalation BID    tiotropium  2 puff Inhalation Daily    remdesivir IVPB  100 mg IntraVENous Q24H       PRN Meds:hydrOXYzine, glucose, dextrose, glucagon (rDNA), dextrose, sodium chloride flush, sodium chloride, ondansetron **OR** ondansetron, polyethylene glycol, acetaminophen **OR** acetaminophen, albuterol sulfate HFA, HYDROcodone-homatropine    Radiology  CTA Chest W WO  (PE study)    Result Date: 10/1/2021  The EXAMINATION: CT scan of the chest with contrast (pulmonary embolism protocol) INDICATION: Chest pain and shortness of breath. COMPARISON: None TECHNIQUE: Helical CT was performed through the chest utilizing 100 cc of 370 intravenous contrast.  Images were obtained with bolus tracking in order to opacify the pulmonary arteries. Both MIP and 3D volume rendered reconstructions were performed. FINDINGS: There is is a limited examination due to suboptimal opacification. Within limits examination no central or proximal pulmonary emboli. There is patchy multifocal to coalescent airspace disease diffusely throughout the lung parenchyma there is some scattered areas of atelectasis. No pleural effusions. No pneumothoraces. There is periaortic, pretracheal, parahilar and subcarinal adenopathy. Field-of-view visualized abdominal contents are unremarkable. There is multilevel degenerative changes of the thoracic spine superimposed upon a mild to moderate dorsal kyphosis     1. LIMITED EXAMINATION DUE TO SUBOPTIMAL OPACIFICATION. WITHIN LIMITS EXAMINATION NO CENTRAL OR PROXIMAL PULMONARY EMBOLI. 2. PATCHY MULTIFOCAL TO COALESCENT AIRSPACE DISEASE DIFFUSELY THROUGHOUT THE LUNG PARENCHYMA. COMMONLY REPORT IMAGING FEATURES OF (COVID-19) PNEUMONIA ARE PRESENT. OTHER PROCESSES SUCH AS INFLUENZA, PNEUMONIA AND ORGANIZING PNEUMONIA AS CAN BE SEEN WITH DRUG TOXICITY AND CONNECTIVE TISSUE DISEASE CAN CAUSE A SIMILAR IMAGING PATTERN. All CT scans at this facility use dose modulation, iterative reconstruction, and/or weight based dosing when appropriate to reduce radiation dose to as low as reasonably achievable. XR CHEST PORTABLE    Result Date: 10/4/2021  Exam: XR CHEST PORTABLE History: Worsening hypoxia. Technique: AP portable view of the chest obtained.  Comparison: CT chest October 1, 2021 and portable chest radiograph September 29, 2021 Findings: Suboptimal inspiration. The cardiomediastinal silhouette is within normal limits. Bilateral airspace opacities are again identified and appear mildly worsened, possibly secondary to suboptimal inspiration. No pneumothorax or pleural effusion. No acute osseous abnormality. Bilateral pneumonia appears progressed when compared to prior chest radiograph of September 29, 2021, possibly in part secondary to suboptimal inspiration. XR CHEST PORTABLE    Result Date: 9/29/2021  Exam: XR CHEST PORTABLE History:  sob Technique: AP portable view of the chest obtained. Comparison: none Chest x-ray portable Findings: The cardiomediastinal silhouette is within normal limits. There are mild increased markings throughout both lungs. . Bones of the thorax appear intact. Increased pulmonary markings throughout both lungs represent pulmonary edema, fluid overload or viral infection. IMPRESSION AND SUGGESTION:  1. Severe COVID-19 pneumonia  2. Acute respiratory failure with severe hypoxia on BiPAP 100% FiO2.  3. History of asthma. 4. Sleep apnea on CPAP at home  5. Obesity    He is on BiPAP with 100% FiO2 his O2 saturation dropped in mid 80s when he is off BiPAP. Currently O2 saturation ranged from 88 to 92%. Transfer to ICU for close observation may need intubation and vent support. Continue Decadron, IV remdesivir. Received Actemra on bronchodilator therapy with Spiriva and Symbicort. Discussed with patient's wife and gave update if condition worsen need intubation and vent support. CC time 34 minutes . Discussed with RN advised to send him to ICU. NOTE: This report was transcribed using voice recognition software. Every effort was made to ensure accuracy; however, inadvertent computerized transcription errors may be present.       Electronically signed by Sally Tavarez MD, [facility] on 10/6/2021 at 11:43 AM

## 2021-10-06 NOTE — PROGRESS NOTES
Assumed care of patient at approximately 1300. Patient was sat'ing in mid to upper [de-identified] laying on right side. Educated patient to prone- patient turned on stomach and saturations maintained at % on the bipap. Patient is A&O x4. New IV placed in R hand. Iv fluids infusing. Will continue to monitor.      Electronically signed by Elissa Beasley RN on 10/6/2021 at 4:51 PM

## 2021-10-06 NOTE — PROGRESS NOTES
Patient took his medications and oxygen saturation is at 86% on the BIPAP. Patient denies pain and is trying to take in deep breaths. Water provided and patient wiped his face prior to mask on. Respirations 26.

## 2021-10-06 NOTE — PROGRESS NOTES
Vitals were assessed and recorded. Patient is trying to sleep. Head of bed elevated at 30 degrees, BIPAP on and no complaint of pain or discomfort. Patient is alert. Respirations are between 26-33 on the BIPAP and saturation between 89-94%. Notified respiratory of findings.

## 2021-10-06 NOTE — PROGRESS NOTES
1200- patient settled into ICU bed 5, transferred from . Attempting to help pt. Prone, currently laying on stomach/side to raise O2 sat. On bipap 100%, sats currently 90%. Family in room to see pt. For one hour.

## 2021-10-07 NOTE — PROGRESS NOTES
Comprehensive Nutrition Assessment    Type and Reason for Visit:  Initial, RD Nutrition Re-Screen/LOS    Nutrition Recommendations/Plan:   Continue with Carb Control 5 diet as tolerated,  Add diabetic oral supplement x3. Consider change in IV F to PPN if nausea persists  Weight ordered for further assessment     Nutrition Assessment:  Pt at high risk for malnutrition related to acute illness ( COVID-19) with inadequate oral intake since admission. Will begin oral nutrtion supplements, may require PPN if pt remains unable to tolerate high flow O2 for meals    Malnutrition Assessment:  Malnutrition Status: At risk for malnutrition (Comment)    Context:  Acute Illness     Findings of the 6 clinical characteristics of malnutrition:  Energy Intake:  7 - 50% or less of estimated energy requirements for 5 or more days  Weight Loss:  Unable to assess     Body Fat Loss:  Unable to assess     Muscle Mass Loss:  Unable to assess    Fluid Accumulation:  No significant fluid accumulation     Strength:  Not Performed    Estimated Daily Nutrient Needs:  Energy (kcal):  2486-0105 kcals @ 15-18 kcals; Weight Used for Energy Requirements:  Admission (131.5 kg)     Protein (g):  101-114 g protein @ 1.2-1.4 g/kg IBW; Weight Used for Protein Requirements:  Ideal (84 kg)        Fluid (ml/day):  ~2300; Method Used for Fluid Requirements:  1 ml/kcal      Nutrition Related Findings:  \"Patient presented to [facility] ED on 9/29 initially with cough and shortness of breath, COVID swab was positive, CXR showed bilateral PNA:. Patricia Red Patricia Red PMH of asthma, hypothyroidism, pituitary microadenoma, hypogonadism, obesity, HARLEEN on CPAP, secondary polycythemia, insomnia\" Oral intake has been < 50% since admission ( 10/1), has been unable to come off of BiPap for meals, to retry [place] O2 again today.  May require intubation per discussion in rounds, + nausea, last BM ( 10/3) on colace and glycolax, elevated glucose wtih steroids noted, recieving synthroid, IVF = LR @ 75 ml/hr,      Wounds:  None       Current Nutrition Therapies:    ADULT DIET; Regular; 5 carb choices (75 gm/meal); Safety Tray; Safety Tray (Disposables)  Adult Oral Nutrition Supplement; Diabetic Oral Supplement    Anthropometric Measures:  · Height: 6' 1\" (185.4 cm)  · Current Body Weight:  (n/a)   · Admission Body Weight: 290 lb (131.5 kg) (stated)    · Usual Body Weight: 292 lb (132.5 kg) (4/21)     · Ideal Body Weight: 184 lbs;   · BMI:  est 38   · BMI Categories: Obese Class 2 (BMI 35.0 -39.9)       Nutrition Diagnosis:   · Inadequate oral intake related to impaired respiratory function, altered GI function as evidenced by nausea, other (comment), intake 26-50% (acute illness (COVID))    Nutrition Interventions:   Food and/or Nutrient Delivery:  Continue Current Diet, Start Oral Nutrition Supplement (Continue with Carb Control 5 diet as tolerated, Add diabetic oral supplement x3. Consider change in IV F to PPN if nausea persists)  Nutrition Education/Counseling:  Education not indicated   Coordination of Nutrition Care:  No recommendation at this time    Goals:  po to improve to > 50% of meals       Nutrition Monitoring and Evaluation:   Behavioral-Environmental Outcomes:  None Identified   Food/Nutrient Intake Outcomes:  Food and Nutrient Intake, Diet Advancement/Tolerance  Physical Signs/Symptoms Outcomes:  Biochemical Data, GI Status, Nausea or Vomiting, Weight, Meal Time Behavior     Discharge Planning:     Too soon to determine     Electronically signed by Nayana Conroy RD, LD on 10/7/21 at 1:11 PM EDT

## 2021-10-07 NOTE — PROGRESS NOTES
Unable to take patient of bipap at this time. Patient's saturation is 85%. Patient instructed to lay on his side.

## 2021-10-07 NOTE — PROGRESS NOTES
Pulmonary & Critical Care Medicine ICU Progress Note  Chief complaint : COVID-19 pneumonia/acute hypoxic respiratory failure    Subjunctive/24 hour events :   Patient seen and examined during multidisciplinary rounds with RN, charge nurse, RT, pharmacy, dietitian, and social service. Patient on BiPAP, currently 100% FiO2, saturation 90 to 93%, he cannot tolerate prone position, denies chest pain, he has dry cough, T-max 99.9, blood sugar is controlled, no nausea no vomiting, no abdominal pain,no bowel movement, urine output 1300 cc, blood sugar is controlled      Social History     Tobacco Use    Smoking status: Never Smoker    Smokeless tobacco: Never Used   Substance Use Topics    Alcohol use: Yes         Problem Relation Age of Onset    Arthritis Mother         RA, Fibromyalgia    Heart Disease Mother         92% blockage in heart, stent    Heart Disease Father     High Blood Pressure Father     High Cholesterol Father     Diabetes Father     Cancer Father         throat    Diabetes Brother        No results for input(s): PHART, KYW7OGB, PO2ART in the last 72 hours. MV Settings:     / / /FiO2 : 100 %           IV:   lactated ringers 125 mL/hr at 10/07/21 0745    dextrose      sodium chloride         Vitals:  /69   Pulse 71   Temp 99.7 °F (37.6 °C) (Bladder)   Resp 21   Ht 6' 1\" (1.854 m)   Wt 290 lb (131.5 kg)   SpO2 93%   BMI 38.26 kg/m²    Tmax:        Intake/Output Summary (Last 24 hours) at 10/7/2021 0811  Last data filed at 10/7/2021 0600  Gross per 24 hour   Intake 2250 ml   Output 1300 ml   Net 950 ml       EXAM:  General: alert, cooperative, on BiPAP, no distress  Head: normocephalic, atraumatic  Eyes:No gross abnormalities. ENT:  MMM no lesions, on BiPAP  Neck:  supple and no masses  Chest : Good air movement, bilateral rales, no wheezing, nontender, tympanic  Heart[de-identified] Heart sounds are normal.  Regular rate and rhythm without murmur, gallop or rub.   ABD:  symmetric, soft, non-tender, no guarding or rebound  Musculoskeletal : no cyanosis, no clubbing and no edema  Neuro:  Grossly normal  Skin: No rashes or nodules noted. Lymph node:  no cervical nodes  Urology: No Metcalf   Psychiatric: appropriate    Medications:  Scheduled Meds:   sodium chloride flush  5-40 mL IntraVENous 2 times per day    enoxaparin  30 mg SubCUTAneous BID    insulin lispro  0-6 Units SubCUTAneous TID WC    insulin lispro  0-3 Units SubCUTAneous Nightly    dexamethasone  6 mg Oral Daily    levothyroxine  75 mcg Oral Daily    clomiPHENE  50 mg Oral Daily    fluticasone  2 spray Each Nostril Daily    traZODone  50 mg Oral Nightly    budesonide-formoterol  2 puff Inhalation BID    tiotropium  2 puff Inhalation Daily    remdesivir IVPB  100 mg IntraVENous Q24H       PRN Meds:  hydrOXYzine, glucose, dextrose, glucagon (rDNA), dextrose, sodium chloride flush, sodium chloride, ondansetron **OR** ondansetron, polyethylene glycol, acetaminophen **OR** acetaminophen, albuterol sulfate HFA, HYDROcodone-homatropine    Results: reviewed by me   CBC:   Recent Labs     10/05/21  0630 10/06/21  0942 10/07/21  0508   WBC 10.4 8.3 10.5   HGB 15.9 15.8 15.6   HCT 48.1 47.7 46.7   MCV 81.3 82.0 81.5    284 282     BMP:   Recent Labs     10/05/21  0629 10/06/21  0549 10/07/21  0508    140 136   K 4.7 4.6 4.8   CL 97 100 99   CO2 27 26 25   BUN 24* 23* 24*   CREATININE 1.13 1.08 0.93     LIVER PROFILE:   Recent Labs     10/05/21  0629 10/06/21  0549 10/07/21  0508   AST 37 34 47*   ALT 37 30 30   BILITOT 0.7 0.6 0.5   ALKPHOS 65 64 65     PT/INR: No results for input(s): PROTIME, INR in the last 72 hours. APTT: No results for input(s): APTT in the last 72 hours. UA:No results for input(s): NITRITE, COLORU, PHUR, LABCAST, WBCUA, RBCUA, MUCUS, TRICHOMONAS, YEAST, BACTERIA, CLARITYU, SPECGRAV, LEUKOCYTESUR, UROBILINOGEN, BILIRUBINUR, BLOODU, GLUCOSEU, AMORPHOUS in the last 72 hours.     Invalid input(s): KETONESU    Cultures:  Negative so far  Films:  CXR reviewed by me and it showed chest x-ray shows bilateral groundglass infiltrates      Assessment: This is a critically ill patient at risk of deterioration / death , needing close ICU monitoring and intervention due to below noted problems   · Severe acute hypoxic respiratory failure  · Severe COVID-19 infection  · Obstructive sleep apnea on CPAP at home  · Obesity  · History of asthma      Recommendation  · O2 to keep sat 93 to 95%  · Remdesivir for 5 days  · Dexamethasone 6 mg daily for 10 days  · Monitor oral intake, try high flow nasal cannula if able to tolerate, if patient unable to tolerate off BiPAP will consider TPN  · Decrease IV fluid, avoid volume overload  · Lasix as needed  · Patient received Actemra  · DVT prophylaxis  · PUD prophylaxis   · Watch closely and intubate if worsening hypoxia/respiratory distress  · Encourage patient to maintain prone position as much as he can tolerate    Due to the immediate potential for life-threatening deterioration due to acute respiratory failure I spent 35  minutes providing critical care.  This time is excluding time spent performing procedures.           Electronically signed by Necia Cheadle, MD,  [facility] ,on 10/7/2021 at 8:11 AM

## 2021-10-07 NOTE — PLAN OF CARE

## 2021-10-07 NOTE — PROGRESS NOTES
Medicated for c/o nausea and anxiety as ordered. Awake alert and oriented x 4. Bipap maintained. Pt desats to 81% when removed to take PO med.

## 2021-10-07 NOTE — PROGRESS NOTES
Pt encouraged to prone but states it is not comfortable but repositioned to left side. Sats remain 93%.

## 2021-10-07 NOTE — PROGRESS NOTES
Hospitalist Progress Note      PCP: Patricia Fuentes MD    Date of Admission: 10/1/2021    Internal Hx/Subjective:  Patient still requiring high BiPAP support with FiO2 100% at 12/6. Did not receive his usual Spiriva this morning because it is not BiPAP compatible. Respiratory therapy will attempt to transition him to heated high flow nasal cannula if possible to administer compatible breathing treatments. Wife present at bedside during examination, concerned that patient has too much anxiety on Atarax every 8 hours. Informed her that increasing anxiolytic medication would likely worsen patient's respiratory status, and thus will not occur at this time for the patient safety. On exam today, patient has grossly unchanged dyspnea, but states that he feels slightly better compared to previous day which was significantly less tolerable. Denies any acute pain. Has ongoing coughing. Denies any nausea/vomiting. Was unable to attempt breakfast because he was unable to tolerate off BiPAP at that time.         Medications:  Reviewed    Infusion Medications    lactated ringers 125 mL/hr at 10/07/21 0745    dextrose      sodium chloride       Scheduled Medications    pantoprazole  40 mg IntraVENous Daily    And    sodium chloride (PF)  10 mL IntraVENous Daily    sodium chloride flush  5-40 mL IntraVENous 2 times per day    enoxaparin  30 mg SubCUTAneous BID    insulin lispro  0-6 Units SubCUTAneous TID WC    insulin lispro  0-3 Units SubCUTAneous Nightly    dexamethasone  6 mg Oral Daily    levothyroxine  75 mcg Oral Daily    clomiPHENE  50 mg Oral Daily    fluticasone  2 spray Each Nostril Daily    traZODone  50 mg Oral Nightly    budesonide-formoterol  2 puff Inhalation BID    tiotropium  2 puff Inhalation Daily    remdesivir IVPB  100 mg IntraVENous Q24H     PRN Meds: hydrOXYzine, glucose, dextrose, glucagon (rDNA), dextrose, sodium chloride flush, sodium chloride, ondansetron **OR** ondansetron, study)   Final Result   1. LIMITED EXAMINATION DUE TO SUBOPTIMAL OPACIFICATION. WITHIN LIMITS EXAMINATION NO CENTRAL OR PROXIMAL PULMONARY EMBOLI.   2. PATCHY MULTIFOCAL TO COALESCENT AIRSPACE DISEASE DIFFUSELY THROUGHOUT THE LUNG PARENCHYMA. COMMONLY REPORT IMAGING FEATURES OF (COVID-19) PNEUMONIA ARE PRESENT. OTHER PROCESSES SUCH AS INFLUENZA, PNEUMONIA AND ORGANIZING PNEUMONIA AS CAN BE SEEN WITH    DRUG TOXICITY AND CONNECTIVE TISSUE DISEASE CAN CAUSE A SIMILAR IMAGING PATTERN. All CT scans at this facility use dose modulation, iterative reconstruction, and/or weight based dosing when appropriate to reduce radiation dose to as low as reasonably achievable. Assessment/Plan:    Summary  46 y.o. male with PMH of asthma, hypothyroidism, pituitary microadenoma, secondary hypogonadism, obesity, HARLEEN on CPAP, secondary polycythemia, insomnia who presented with:     Acute hypoxic respiratory failure  - due to COVID pneumonia  - requiring BiPAP support  - on Decadron, Remdesivir, Lovenox  -Tocilizumab per ID  - ID and pulmonology consulted   - Trending inflammatory markers, including ferritin. - Transferred to ICU the morning of 10/6/2021 in setting of progressive respiratory decompensation, currently on BiPAP with 12/6 and FiO2 100%. Family aware of need for possible intubation if worsening respiratory status. Hyperglycemia   - no history of DM, likely related to decadron utilization  - ISS, hypoglycemia protocol     Elevated Cr  - monitor renal function     Asthma  - continue home regimen     HARLEEN  -Currently continuous rescue BiPAP at present. Anxiety  -Atarax every 8 hours per ICU team.  Recommend no increase at present due to risk for sedation and worsening respiratory status. Hypothyroidism, hypogonadism, insomnia   - continue/hold home meds as ordered           Diet: ADULT DIET; Regular; 5 carb choices (75 gm/meal);  Safety Tray; Safety Tray (Disposables)  -n.p.o. while using

## 2021-10-07 NOTE — PLAN OF CARE
Nutrition Problem #1: Inadequate oral intake  Intervention: Food and/or Nutrient Delivery: Continue Current Diet, Start Oral Nutrition Supplement (Continue with Carb Control 5 diet as tolerated, Add diabetic oral supplement x3.  Consider change in IV F to PPN if nausea persists)  Nutritional Goals: po to improve to > 50% of meals

## 2021-10-07 NOTE — PROGRESS NOTES
Pt on his back to trial hi-flow but sats 85%. Spoke with respiratory and agreed will not put on hi-flow. Pt repositioned and cool compress applied to forehead. Pt requested medication for anxiety. Given with apple juice when mask removed for a minute. Pt tolerated well. Reminded to cough and deep breathe.

## 2021-10-07 NOTE — PROGRESS NOTES
Infectious Diseases Inpatient Progress Note          HISTORY OF PRESENT ILLNESS:  Follow up COVID-19 pneumonia with acute respiratory failure and hypoxia on IV remdesivir, status post tocilizumab, well tolerated. Patient has persistent shortness of breath and cough, no appetite   Remains to be severely hypoxic currently on BIPAP, quickly desats to 80s when BiPAP is removed even for a short period of time. Very weak  Neck and low back pain from laying in bed  Unable to prone  Current Medications:     pantoprazole  40 mg IntraVENous Daily    And    sodium chloride (PF)  10 mL IntraVENous Daily    sodium chloride flush  5-40 mL IntraVENous 2 times per day    enoxaparin  30 mg SubCUTAneous BID    insulin lispro  0-6 Units SubCUTAneous TID WC    insulin lispro  0-3 Units SubCUTAneous Nightly    dexamethasone  6 mg Oral Daily    levothyroxine  75 mcg Oral Daily    clomiPHENE  50 mg Oral Daily    fluticasone  2 spray Each Nostril Daily    traZODone  50 mg Oral Nightly    budesonide-formoterol  2 puff Inhalation BID    tiotropium  2 puff Inhalation Daily    remdesivir IVPB  100 mg IntraVENous Q24H       Allergies:  Aleve [naproxen]      Review of Systems  14 system review is negative other than HPI    Physical Exam  Vitals:    10/07/21 0700 10/07/21 0800 10/07/21 0900 10/07/21 1300   BP: 134/69 137/67 119/66    Pulse: 71 88 82    Resp: 21 22 24    Temp:  99.5 °F (37.5 °C) 99.7 °F (37.6 °C)    TempSrc:       SpO2: 93%  91%    Weight:    267 lb 3.2 oz (121.2 kg)   Height:         General Appearance: alert and oriented to person, place and time, well-developed and well-nourished, in no acute distress, on BIPAP  Skin: warm and dry, no rash. Head: normocephalic and atraumatic  Eyes: anicteric sclerae  ENT: oropharynx clear and moist with normal mucous membranes.  No oral thrush  Lungs: normal respiratory effort, diminished breath sounds bilateral lung fields, no wheezes, minimal basilar rales  Heart normal S1-S2 no murmur  Abdomen: soft, no tenderness  No leg edema  No erythema, no tenderness      DATA:    Lab Results   Component Value Date    WBC 10.5 10/07/2021    HGB 15.6 10/07/2021    HCT 46.7 10/07/2021    MCV 81.5 10/07/2021     10/07/2021     Lab Results   Component Value Date    CREATININE 0.93 10/07/2021    BUN 24 (H) 10/07/2021     10/07/2021    K 4.8 10/07/2021    CL 99 10/07/2021    CO2 25 10/07/2021       Hepatic Function Panel:  Lab Results   Component Value Date    ALKPHOS 65 10/07/2021    ALT 30 10/07/2021    AST 47 10/07/2021    PROT 5.9 10/07/2021    BILITOT 0.5 10/07/2021    LABALBU 2.8 10/07/2021     Normal procalcitonin  Normal D-dimer     Impression       Bilateral pneumonia appears progressed when compared to prior chest radiograph of September 29, 2021, possibly in part secondary to suboptimal inspiration.         10/4/2021  6:29 PM - Benson, Chpo Incoming Lab Results From Soft    Component Value Ref Range & Units Status Collected Lab   Ferritin 2,022.0High   30.0 - 400.0 ng/mL Final       10/7/2021  6:08 AM - Benson, Chpo Incoming Lab Results From Soft    Component Value Ref Range & Units Status Collected Lab   Ferritin 1,500.0High   30.0 - 400.0 ng/mL Final 10/07/2021  5:08 AM  - [facility]            IMPRESSION:    · Critical COVID-19 pneumonia, worsening  · Acute respiratory failure with severe hypoxia  · Probable cytokine storm, decreasing inflammatory markers  · History of asthma    Patient Active Problem List   Diagnosis    Seasonal allergies    Acquired hypothyroidism    Hypogonadism, male    Hyperlipidemia    Chronic bilateral low back pain without sciatica    Intermittent asthma    Irritable bowel syndrome with diarrhea    Pneumonia due to 2019 novel coronavirus    Acute respiratory failure with hypoxia (HCC)       PLAN:  · Status post Actemra 640 mg IV on 10/05  · 10 days remdesivir  · Decadron and anticoagulation as ordered  · Follow-up CBC complete metabolic profile  · Oxygen support and weaning as tolerated  · Follow-up with pulmonary  · Continue ICU monitoring  ·   Discussed with patient and RN    Kisha Munson MD

## 2021-10-07 NOTE — PROGRESS NOTES
Attempted  to change patient to HFNC. Due to patient's saturation 87%, I am unable to take patient off his bipap. I did speak with patient and significant other regarding not changing patient to HFNC. Both seemed to acknowledge that saturation would decrease further and that HFNC would not be appropriate.

## 2021-10-08 NOTE — FLOWSHEET NOTE
2030 Pt resting quietly on side states unable to prone  Bipap mask on on 100% Fio2  O2 sats range 88-92 %  Moist productive cough at times. 0415 Slept at intervals.  Bipap maintained all night vitals stable

## 2021-10-08 NOTE — PROGRESS NOTES
Pulmonary & Critical Care Medicine ICU Progress Note  Chief complaint : COVID-19 pneumonia/acute hypoxic respiratory failure    Subjunctive/24 hour events :   Patient seen and examined during multidisciplinary rounds with RN, charge nurse, RT, pharmacy, dietitian, and social service. On BiPAP, laying on his side, he cannot tolerate prone position, he feels better today, no coughing, no chest pain, no nausea or vomiting, he is on 100% FiO2, saturation 93 to 95%, no fever, urine output 2100 cc, + bowel movement      Social History     Tobacco Use    Smoking status: Never Smoker    Smokeless tobacco: Never Used   Substance Use Topics    Alcohol use: Yes         Problem Relation Age of Onset    Arthritis Mother         RA, Fibromyalgia    Heart Disease Mother         92% blockage in heart, stent    Heart Disease Father     High Blood Pressure Father     High Cholesterol Father     Diabetes Father     Cancer Father         throat    Diabetes Brother        No results for input(s): PHART, WEO7EVD, PO2ART in the last 72 hours. MV Settings:     / / /FiO2 : 100 %           IV:   lactated ringers 125 mL/hr at 10/08/21 0814    dextrose      sodium chloride         Vitals:  BP (!) 141/95   Pulse 101   Temp 99.7 °F (37.6 °C) (Bladder)   Resp (!) 35   Ht 6' 1\" (1.854 m)   Wt 267 lb 3.2 oz (121.2 kg)   SpO2 93%   BMI 35.25 kg/m²    Tmax:        Intake/Output Summary (Last 24 hours) at 10/8/2021 0852  Last data filed at 10/8/2021 0454  Gross per 24 hour   Intake 1437.5 ml   Output 2100 ml   Net -662.5 ml       EXAM:  General: alert, cooperative, on BiPAP, no distress  Head: normocephalic, atraumatic  Eyes:No gross abnormalities. ENT:  MMM no lesions, on BiPAP  Neck:  supple and no masses  Chest : Good air movement, bilateral rales, no wheezing, nontender, tympanic  Heart[de-identified] Heart sounds are normal.  Regular rate and rhythm without murmur, gallop or rub.   ABD:  symmetric, soft, non-tender, no guarding or rebound  Musculoskeletal : no cyanosis, no clubbing and no edema  Neuro:  Grossly normal  Skin: No rashes or nodules noted. Lymph node:  no cervical nodes  Urology: No Metcalf   Psychiatric: appropriate    Medications:  Scheduled Meds:   pantoprazole  40 mg IntraVENous Daily    And    sodium chloride (PF)  10 mL IntraVENous Daily    sodium chloride flush  5-40 mL IntraVENous 2 times per day    enoxaparin  30 mg SubCUTAneous BID    insulin lispro  0-6 Units SubCUTAneous TID WC    insulin lispro  0-3 Units SubCUTAneous Nightly    dexamethasone  6 mg Oral Daily    levothyroxine  75 mcg Oral Daily    clomiPHENE  50 mg Oral Daily    fluticasone  2 spray Each Nostril Daily    traZODone  50 mg Oral Nightly    budesonide-formoterol  2 puff Inhalation BID    tiotropium  2 puff Inhalation Daily    remdesivir IVPB  100 mg IntraVENous Q24H       PRN Meds:  hydrOXYzine, glucose, dextrose, glucagon (rDNA), dextrose, sodium chloride flush, sodium chloride, ondansetron **OR** ondansetron, polyethylene glycol, acetaminophen **OR** acetaminophen, albuterol sulfate HFA, HYDROcodone-homatropine    Results: reviewed by me   CBC:   Recent Labs     10/06/21  0942 10/07/21  0508   WBC 8.3 10.5   HGB 15.8 15.6   HCT 47.7 46.7   MCV 82.0 81.5    282     BMP:   Recent Labs     10/06/21  0549 10/07/21  0508 10/08/21  0532    136 136   K 4.6 4.8 5.0*    99 98   CO2 26 25 22   BUN 23* 24* 21*   CREATININE 1.08 0.93 1.11     LIVER PROFILE:   Recent Labs     10/06/21  0549 10/07/21  0508 10/08/21  0532   AST 34 47* 42*   ALT 30 30 27   BILITOT 0.6 0.5 0.6   ALKPHOS 64 65 66     PT/INR: No results for input(s): PROTIME, INR in the last 72 hours. APTT: No results for input(s): APTT in the last 72 hours.   UA:No results for input(s): NITRITE, COLORU, PHUR, LABCAST, WBCUA, RBCUA, MUCUS, TRICHOMONAS, YEAST, BACTERIA, CLARITYU, SPECGRAV, LEUKOCYTESUR, UROBILINOGEN, BILIRUBINUR, BLOODU, GLUCOSEU, AMORPHOUS in the last 72 hours.    Invalid input(s): Sharmin Olmos    Cultures:  Negative so far  Films:  CXR reviewed by me and it showed chest x-ray shows bilateral groundglass infiltrates      Assessment: This is a critically ill patient at risk of deterioration / death , needing close ICU monitoring and intervention due to below noted problems   · Severe acute hypoxic respiratory failure  · Severe COVID-19 infection  · Obstructive sleep apnea on CPAP at home  · Obesity  · History of asthma      Recommendation  · O2 to keep sat 93 to 95%  · Remdesivir for 5 days  · Dexamethasone 6 mg daily for 10 days  · Monitor oral intake   · Decrease IV fluid, avoid volume overload  · Lasix as needed  · Patient received Actemra  · DVT prophylaxis  · PUD prophylaxis   · Watch closely and intubate if worsening hypoxia/respiratory distress  · Prone position encouraged  · Check procalcitonin in a.m. Due to the immediate potential for life-threatening deterioration due to acute respiratory failure I spent 35 minutes providing critical care.  This time is excluding time spent performing procedures.           Electronically signed by Campos Hernadez MD,  [facility] ,on 10/8/2021 at 8:52 AM

## 2021-10-08 NOTE — PROGRESS NOTES
Infectious Diseases Inpatient Progress Note          HISTORY OF PRESENT ILLNESS:  Follow up COVID-19 pneumonia with acute respiratory failure and hypoxia on IV remdesivir, status post tocilizumab, well tolerated. Patient has persistent shortness of breath and cough, no appetite   Remains to be severely hypoxic currently on BIPAP, quickly desats to 80s when BiPAP is removed even for a short period of time. Very weak  Neck and low back pain from laying in bed  Unable to prone  Current Medications:     docusate sodium  100 mg Oral BID    pantoprazole  40 mg IntraVENous Daily    And    sodium chloride (PF)  10 mL IntraVENous Daily    sodium chloride flush  5-40 mL IntraVENous 2 times per day    enoxaparin  30 mg SubCUTAneous BID    insulin lispro  0-6 Units SubCUTAneous TID WC    insulin lispro  0-3 Units SubCUTAneous Nightly    dexamethasone  6 mg Oral Daily    levothyroxine  75 mcg Oral Daily    clomiPHENE  50 mg Oral Daily    fluticasone  2 spray Each Nostril Daily    traZODone  50 mg Oral Nightly    budesonide-formoterol  2 puff Inhalation BID    tiotropium  2 puff Inhalation Daily    remdesivir IVPB  100 mg IntraVENous Q24H       Allergies:  Aleve [naproxen]      Review of Systems  Limited 14 system review is negative other than HPI/BiPAP    Physical Exam  Vitals:    10/08/21 0600 10/08/21 0700 10/08/21 0800 10/08/21 0801   BP: 131/82 135/82 (!) 141/95    Pulse: 75 79 101    Resp: 25 (!) 31 21 (!) 35   Temp:   99.7 °F (37.6 °C)    TempSrc:   Bladder    SpO2: 94% 94% 93%    Weight:       Height:         General Appearance: alert and oriented to person, place and time, well-developed and well-nourished, in no acute distress, on BIPAP  Skin: warm and dry, no rash. Head: normocephalic and atraumatic  Eyes: anicteric sclerae  ENT: oropharynx clear and moist with normal mucous membranes.  No oral thrush  Lungs: normal respiratory effort, diminished breath sounds bilateral lung fields, no wheezes, minimal basilar rales  Heart normal S1-S2 no murmur  Abdomen: soft, no tenderness  No leg edema  No erythema, no tenderness      DATA:    Lab Results   Component Value Date    WBC 10.5 10/07/2021    HGB 15.6 10/07/2021    HCT 46.7 10/07/2021    MCV 81.5 10/07/2021     10/07/2021     Lab Results   Component Value Date    CREATININE 1.11 10/08/2021    BUN 21 (H) 10/08/2021     10/08/2021    K 5.0 (H) 10/08/2021    CL 98 10/08/2021    CO2 22 10/08/2021       Hepatic Function Panel:  Lab Results   Component Value Date    ALKPHOS 66 10/08/2021    ALT 27 10/08/2021    AST 42 10/08/2021    PROT 5.7 10/08/2021    BILITOT 0.6 10/08/2021    LABALBU 2.7 10/08/2021     Normal procalcitonin  Normal D-dimer     Impression       Bilateral pneumonia appears progressed when compared to prior chest radiograph of September 29, 2021, possibly in part secondary to suboptimal inspiration.         10/4/2021  6:29 PM - Benson, Chpo Incoming Lab Results From Soft    Component Value Ref Range & Units Status Collected Lab   Ferritin 2,022.0High   30.0 - 400.0 ng/mL Final       10/7/2021  6:08 AM - Benson, Chpo Incoming Lab Results From Soft    Component Value Ref Range & Units Status Collected Lab   Ferritin 1,500.0High   30.0 - 400.0 ng/mL Final 10/07/2021  5:08 AM [facility] - [facility]      10/8/2021 10:49 AM - Benson, Chpo Incoming Lab Results From Soft    Component Value Ref Range & Units Status Collected Lab   Ferritin 1,408. 0High   30.0 - 400.0 ng/mL              IMPRESSION:    · Critical COVID-19 pneumonia, worsening  · Acute respiratory failure with severe hypoxia  · Probable cytokine storm, decreasing inflammatory markers  · History of asthma    Patient Active Problem List   Diagnosis    Seasonal allergies    Acquired hypothyroidism    Hypogonadism, male    Hyperlipidemia    Chronic bilateral low back pain without sciatica    Intermittent asthma    Irritable bowel syndrome with diarrhea    Pneumonia due to 2019 novel

## 2021-10-08 NOTE — PLAN OF CARE

## 2021-10-08 NOTE — PROGRESS NOTES
Hospitalist Progress Note      PCP: Theodore Baker MD    Date of Admission: 10/1/2021    Internal Hx/Subjective:  Patient still requiring high BiPAP support with FiO2 100% at 12/6. Per Critical Care team notes and discussion with RN, patient is becoming more fatigued from respiratory effort. Pulm/Crit team had discussion with patient and wife earlier today, and plan for elective intubated/mechanical ventilation for respiratory support later this afternoon.     Medications:  Reviewed    Infusion Medications    lactated ringers 125 mL/hr at 10/08/21 0814    dextrose      sodium chloride       Scheduled Medications    docusate sodium  100 mg Oral BID    pantoprazole  40 mg IntraVENous Daily    And    sodium chloride (PF)  10 mL IntraVENous Daily    sodium chloride flush  5-40 mL IntraVENous 2 times per day    enoxaparin  30 mg SubCUTAneous BID    insulin lispro  0-6 Units SubCUTAneous TID WC    insulin lispro  0-3 Units SubCUTAneous Nightly    dexamethasone  6 mg Oral Daily    levothyroxine  75 mcg Oral Daily    clomiPHENE  50 mg Oral Daily    fluticasone  2 spray Each Nostril Daily    traZODone  50 mg Oral Nightly    budesonide-formoterol  2 puff Inhalation BID    tiotropium  2 puff Inhalation Daily    remdesivir IVPB  100 mg IntraVENous Q24H     PRN Meds: hydrOXYzine, glucose, dextrose, glucagon (rDNA), dextrose, sodium chloride flush, sodium chloride, ondansetron **OR** ondansetron, polyethylene glycol, acetaminophen **OR** acetaminophen, albuterol sulfate HFA, HYDROcodone-homatropine      Intake/Output Summary (Last 24 hours) at 10/8/2021 1412  Last data filed at 10/8/2021 0454  Gross per 24 hour   Intake 1437.5 ml   Output 2100 ml   Net -662.5 ml       Exam:    BP (!) 141/95   Pulse 101   Temp 99.7 °F (37.6 °C) (Bladder)   Resp (!) 35   Ht 6' 1\" (1.854 m)   Wt 267 lb 3.2 oz (121.2 kg)   SpO2 93%   BMI 35.25 kg/m²   In setting of COVID-19 pandemic in an interest of preventing further infection spread, physical exam composed of visual components from doorway, telemetry, and conversations with other active team members in ICU. General appearance:  Obese adult male laying right lateral decubitus BiPAP mask. Alert, ill-appearing. Appears more fatigued. Wife present at bedside  Lungs: Moderate tachypnea on BiPAP at 12/6 with FiO2 100%, repeated coughing during visual exam.  SpO2 85-88% while talking with wife through BiPAP mask. Heart:  RRR, with heart rate in 93 at time of exam.  No vasopressors. Abdomen:  Obese. Extremities: No edema visually appreciated. Neurologic: Alert. Observed to move all 4 extremities. Appears fatigued. Labs:   Recent Labs     10/06/21  0942 10/07/21  0508   WBC 8.3 10.5   HGB 15.8 15.6   HCT 47.7 46.7    282     Recent Labs     10/06/21  0549 10/07/21  0508 10/08/21  0532    136 136   K 4.6 4.8 5.0*    99 98   CO2 26 25 22   BUN 23* 24* 21*   CREATININE 1.08 0.93 1.11   CALCIUM 8.8 8.5 8.4*     Recent Labs     10/06/21  0549 10/07/21  0508 10/08/21  0532   AST 34 47* 42*   ALT 30 30 27   BILITOT 0.6 0.5 0.6   ALKPHOS 64 65 66     No results for input(s): INR in the last 72 hours. No results for input(s): Prentis Revels in the last 72 hours. Urinalysis:    No results found for: REBECA Petersen Joelle Stabler, [id number]    Radiology:  XR CHEST PORTABLE   Final Result      Bilateral pneumonia appears progressed when compared to prior chest radiograph of September 29, 2021, possibly in part secondary to suboptimal inspiration. CTA Chest W WO  (PE study)   Final Result   1. LIMITED EXAMINATION DUE TO SUBOPTIMAL OPACIFICATION. WITHIN LIMITS EXAMINATION NO CENTRAL OR PROXIMAL PULMONARY EMBOLI.   2. PATCHY MULTIFOCAL TO COALESCENT AIRSPACE DISEASE DIFFUSELY THROUGHOUT THE LUNG PARENCHYMA.  COMMONLY REPORT IMAGING FEATURES OF (COVID-19) PNEUMONIA ARE PRESENT. OTHER PROCESSES SUCH AS INFLUENZA, PNEUMONIA AND ORGANIZING PNEUMONIA AS CAN BE SEEN WITH    DRUG TOXICITY AND CONNECTIVE TISSUE DISEASE CAN CAUSE A SIMILAR IMAGING PATTERN. All CT scans at this facility use dose modulation, iterative reconstruction, and/or weight based dosing when appropriate to reduce radiation dose to as low as reasonably achievable. Assessment/Plan:    Summary  46 y.o. male with PMH of asthma, hypothyroidism, pituitary microadenoma, secondary hypogonadism, obesity, HARLEEN on CPAP, secondary polycythemia, insomnia who presented with:     Acute hypoxic respiratory failure  - due to COVID pneumonia  - requiring BiPAP support  - on Decadron, Remdesivir, Lovenox  -Tocilizumab per ID  - ID and pulmonology consulted   - Trending inflammatory markers, including ferritin. - Transferred to ICU the morning of 10/6/2021 in setting of progressive respiratory decompensation, currently on BiPAP with 12/6 and FiO2 100%. Family aware of need for possible intubation if worsening respiratory status. -10/8: Pulm/Crit team planning for elective intubation this afternoon in setting of progressive respiratory fatigue. Patient and wife in agreement with plan, per nursing. Hyperglycemia   - no history of DM, likely related to decadron utilization  - ISS, hypoglycemia protocol     Elevated Cr  - monitor renal function     Asthma  - continue home regimen     HARLEEN  -Currently continuous rescue BiPAP at present. Anxiety  -Atarax every 8 hours per ICU team.  Recommend no increase at present due to risk for sedation and worsening respiratory status. Hypothyroidism, hypogonadism, insomnia   - continue/hold home meds as ordered           Diet: ADULT DIET; Regular; 5 carb choices (75 gm/meal); Safety Tray; Safety Tray (Disposables)  Adult Oral Nutrition Supplement; Diabetic Oral Supplement  -n.p.o. while using BiPAP.   Likely initiate tube feeds s/p intubation - defer to ICU team.        Code Status: Full Code          Electronically signed by Vianey Cifuentes DO on 10/8/2021 at 2:12 PM

## 2021-10-08 NOTE — PROCEDURES
Internal jugular central venous catheter; Ultrasound guided. [id number]                                                             [id number]    INDICATION:   Iv Access       CONSENT:  The patient was counseled regarding the procedure, it's indications, risks, potential complications and alternatives and any questions were answered. Consent was obtained. PROCEDURE SUMMARY:   A time-out was performed. The patient's right neck region was prepped and draped in sterile fashion. The right internal jugular vein was accessed under ultrasound guidance using a finder needle and sheath. U/S images were permanently documented. Anesthesia was achieved with 1% lidocaine. The finder needle was inserted into the right neck under direct ultrasound guidance, and venous blood was withdrawn. The introducer needle was then inserted under direct ultrasound guidance and venous blood was withdrawn into the syringe. The syringe was removed and the guidewire was advanced through the introducer needle. A small incision was made with a scalpel and the introducer needle was removed. A dilator was advanced over the guidewire until appropriate dilation was obtained. The dilator was removed and an central venous  catheter was advanced over the guidewire and secured into place with 2 sutures at 20 cm. At time of procedure completion, all ports aspirated and flushed properly. Estimated Blood loss   less than 5 cc    COMPLICATIONS:  Pneumothorax    PROCEDURE:   Brachial artery line placement. (A-line)  [id number]    INDICATION: Frequent Abgs       CONSENT: The patient was counseled regarding the procedure, it's indications, risks, potential complications and alternatives and any questions were answered. Consent was obtained. nt. PROCEDURE SUMMARY:   Radial arteries were assessed by palpation and ultrasound localization. [place] Sarahi test was done to asses collateral circulation.   The patient was prepped and draped in the usual sterile manner using chlorhexidine scrub. 1% lidocaine was used to numb the region. The right  radial artery was palpated and successfully cannulated on the first pass. Pulsatile, arterial blood was visualized and the artery was then threaded using the Seldinger technique and a catheter was then sutured into place. Good wave-form was obtained. The patient tolerated the procedure well without any immediate complications. The area was cleaned and Tegaderm was applied. ESTIMATED BLOOD LOSS:   Minimal    COMPLICATIONS:  No immediate complication     TYLER Schmid CNP      PROCEDURE:   Endotracheal intubation. INDICATIONS:   Acute Respiratory Failure. Consent   The patient was counseled regarding the procedure, it's indications, risks, potential complications and alternatives and any questions were answered. Consent was obtained. PROCEDURE SUMMARY:   Permit was implied secondary to emergent situation. Amac 4 blade  was inserted into the oropharynx at which time the vocal cords were visualized. 7.5 [place] endotracheal tube was inserted and visualized going through the vocal cords. The stylette was removed. Colorimetric change was visualized on the CO2 meter. Breath sounds were heard in both lung fields equally. The endotracheal tube was placed at 23 cm, measured at the teeth. COMPLICATIONS:   None     ESTIMATED BLOOD LOSS:   None       TYLER Schmid CNP 6:10 PM 10/8/2021 .

## 2021-10-08 NOTE — PROCEDURES
CHEST TUBE   [id number]   PROCEDURE PERFORMED:   1. right sided 12-[place] Pigtail chest tube placement. 2. Ultrasound guidance no    CONSENT: Procedure emergent, patient became hypoxic    MEDICATIONS USED:   Lidocaine 1% without epinephrine, total quantity 5 mL. PREOPERATIVE DIAGNOSIS(ES):   1.right iatrogenic pneumothorax    POSTPROCEDURE DIAGNOSIS(ES):   1. Same     DESCRIPTION OF PROCEDURE:     While on the exam table, the right hemothorax was examined by ultrasound and the diaphragm and pleural fluid were localized. The patient was then prepped and draped in the usual sterile fashion and the skin was anesthetized with 1% lidocaine without epinephrine. A finder need was inserted into the pleural space with return of air  . A  20 F  chest tube was then placed to 20 cm in depth using a modified Seldinger technique and serial dilations. The catheter was secured in place with 2-0 silk sutures x 1  and a sterile dressing was applied. there was a gush of air initially, and into the chest tube then air stopped. . The patient had stable vital signs throughout the entire procedur      ESTIMATED BLOOD LOSS:  less than 5 cc    COMPLICATIONS:  None  Spoke with patient wife and updated her.       Electronically signed by Akash Morgan MD, [facility]   on 10/8/2021 at 6:18 PM

## 2021-10-09 NOTE — PLAN OF CARE
Nutrition Problem #1: Inadequate oral intake  Intervention: Food and/or Nutrient Delivery: Continue NPO, Modify Tube Feeding (Once current Liter of Jevity is finished, change TF to Peptide Based formula (Vital 1.2) @ 20 ml/hr x 23 hrs (hold TF 1 hr for synthroid). Add 1 proteinex to water flush TID.   50 ml water flush every 6 hrs)  Nutritional Goals: New goal with intubation: Initiation and tolerance to TF

## 2021-10-09 NOTE — PROGRESS NOTES
Wife at bedside, updated on pt condition. Intervetionals staff spoke with pt and wife regaurding need for intubation due to low oxygen sats on the 100% bipap.

## 2021-10-09 NOTE — PROGRESS NOTES
Comprehensive Nutrition Assessment    Type and Reason for Visit:  Initial, Consult (TF order and manage)    Nutrition Recommendations/Plan:   Once current Liter of Jevity is finished, change TF to Peptide Based formula (Vital 1.2) @ 20 ml/hr x 23 hrs (hold TF 1 hr for synthroid). Add 1 proteinex to water flush TID. 50 ml water flush every 6 hrs    Nutrition Assessment:  Pt declining from a nutrition standpoint, required intubation last night. TF started this morning. Will modify TF to better meet needs for intubated pt. Pt may be unable to reach goal rate to meet estimated nutrition needs due to proning schedule and high rate of propofol, recommend starting MVI with minerals to compensate for vitamin/mineral deficit. Malnutrition Assessment:  Malnutrition Status: At risk for malnutrition (Comment)    Context:  Acute Illness     Findings of the 6 clinical characteristics of malnutrition:  Energy Intake:  7 - 50% or less of estimated energy requirements for 5 or more days  Weight Loss:  Unable to assess     Body Fat Loss:  Unable to assess     Muscle Mass Loss:  Unable to assess    Fluid Accumulation:  No significant fluid accumulation     Strength:  Not Performed    Estimated Daily Nutrient Needs:  Energy (kcal):  3860-2409 (kg x 11-14); Weight Used for Energy Requirements:  Current (121.2 kg)     Protein (g):  168 gm (kg IBW x 2.0); Weight Used for Protein Requirements:  Ideal (84 kg)        Fluid (ml/day):  ~2300; Method Used for Fluid Requirements:  1 ml/kcal      Nutrition Related Findings:  \"Patient presented to [address] [place] [address] ED on 9/29 initially with cough and shortness of breath, COVID swab was positive, CXR showed bilateral PNA:. [facility] of asthma, hypothyroidism, pituitary microadenoma, hypogonadism, obesity, HARLEEN on CPAP, secondary polycythemia, insomnia\" Oral intake has been < 50% since admission ( 10/1). Intubated (10/8). OGT in place, propofol @ 34 ml/hr (897 kcal). proning.   last BM (10/3) on colace and glycolax, elevated glucose wtih steroids noted, recieving synthroid, nimbex. OGT in placeNa+ 134      Wounds:  None       Current Nutrition Therapies:    ADULT TUBE FEEDING; Orogastric; Standard with Fiber; Continuous; 20; No; 50; Q 4 hours  Current Tube Feeding (TF) Orders:  · Feeding Route: Orogastric  · Formula: Peptide Based (Vital 1.2)  · Schedule: Continuous @ 20 ml/hr x 23 hrs (Hold TF 1 hr for synthroid)  · Additives/Modulars: Protein (x 3 (312 kcal, 78 gm protein))  · Water Flushes: 50 ml every 4 hrs (300 ml)  · Current TF & Flush Orders Provides: @ Jevity 1.5 @ 20 ml/hr x 23 hrs = 720 kcal (+ propofol), 30 gm protein, ~ 660 ml free water  · Goal TF & Flush Orders Provides: with change to Vital 1.2 @ 20 ml/hr x 23 hrs + 3 proteinex daily = 864 kcal (+ propofol ), 112 gm protein, ~ 690 ml free water    Anthropometric Measures:  · Height: 6' 1\" (185.4 cm)  · Current Body Weight: 267 lb (121.1 kg) (10/7-? accurate given UBW/admission bwt)   · Admission Body Weight: 290 lb (131.5 kg) (stated)    · Usual Body Weight: 292 lb (132.5 kg) (4/21)     · Ideal Body Weight: 184 lbs; % Ideal Body Weight  > 100%   · BMI: 35.2  · BMI Categories: Obese Class 2 (BMI 35.0 -39.9)       Nutrition Diagnosis:   · Inadequate oral intake related to impaired respiratory function, altered GI function as evidenced by intubation    Nutrition Interventions:   Food and/or Nutrient Delivery:  Continue NPO, Modify Tube Feeding (Once current Liter of Jevity is finished, change TF to Peptide Based formula (Vital 1.2) @ 20 ml/hr x 23 hrs (hold TF 1 hr for synthroid). Add 1 proteinex to water flush TID.   50 ml water flush every 6 hrs)  Nutrition Education/Counseling:  Education not indicated   Coordination of Nutrition Care:  No recommendation at this time    Goals:  New goal with intubation: Initiation and tolerance to TF       Nutrition Monitoring and Evaluation:    Food/Nutrient Intake Outcomes:  Enteral Nutrition Intake/Tolerance  Physical Signs/Symptoms Outcomes:  Biochemical Data, GI Status, Fluid Status or Edema, Weight     Electronically signed by Daya Live RD, LD on 10/9/21 at 8:43 AM EDT

## 2021-10-09 NOTE — PROGRESS NOTES
Dr Radhames Grace in, sats remain low 84% continuous for the past 90 min with minimal improvement with self proning. Explained to pt need for intubation. Pt agreed and consents obtained.

## 2021-10-09 NOTE — PROGRESS NOTES
Pulmonary ICU Progress Note    PRIMARY SERVICE: Pulmonary Disease    INTERVAL HPI: Patient seen and examined at bedside, Interval Notes, orders reviewed. Nursing notes noted    Patient is on vent Support with AC mode, rate 30 tidal volume 450 FiO2 90% PEEP of 18  Patient is on sedation with diprivan, propofol and Nimbex drip. He is on 100% FiO2 his O2 saturation is 91 to 92%. His temperature is 100 °F.  He had iatrogenic pneumothorax and small chest tube is placed with reexpansion of lung on right side. Patient is in prone position. No vomiting or diarrhea. Review of Systems   sedated unable to obtain. Intake/Output Summary (Last 24 hours) at 10/9/2021 1031  Last data filed at 10/9/2021 0554  Gross per 24 hour   Intake 2562.21 ml   Output 2850 ml   Net -287.79 ml       Vitals:  BP (!) 185/123   Pulse 87   Temp 100 °F (37.8 °C) (Bladder)   Resp 30   Ht 6' 1\" (1.854 m)   Wt 267 lb 3.2 oz (121.2 kg)   SpO2 91%   BMI 35.25 kg/m²   EXAM:  General: Obese, orally intubated, sedated, comfortable in bed, No distress. Head: Atraumatic ,Normocephalic   Eyes: PERRL. No sclera icterus. No conjunctival injection. No discharge   ENT: No nasal  discharge. Pharynx clear. Neck:  Trachea midline. No thyromegaly, no JVD, No cervical adenopathy. Resp : Normal effort,  No accessory muscle use. No Rales. No wheezing. No rhonchi. CV: Normal  rate. Regular rhythm. No mumur ,  Rub or gallop  ABD: Non-tender. Non-distended. No masses. No organmegaly. Normal bowel sounds. No hernia.   EXT: No Pitting, No Cyanosis No clubbing  CNS: Sedated      ABG:     Lab Results   Component Value Date    PHART 7.384 10/09/2021    DBK7JFD 46 10/09/2021    PO2ART 95 10/09/2021    MOB6SHZ 27.5 10/09/2021    BEART 2 10/09/2021    P4MIEVFC 97 10/09/2021     Lab Results   Component Value Date    LACTA 1.3 09/29/2021     O2 Device: PAP (positive airway pressure)  O2 Flow Rate (L/min): 12 L/min    MV Settings:     Vent Mode: AC/VC  Vt Ordered: 450 mL  Rate Set: 30 bmp  FiO2 : (S) 90 %  PEEP/CPAP: 18  Peak Inspiratory Pressure: 38 cmH2O  Plateau Pressure: 23 cmH20  Mean Airway Pressure: 25 cmH20  I:E Ratio: 1:1.40    Diet NPO  ADULT TUBE FEEDING; Orogastric; Peptide Based; Continuous; 20; No; 50; Q 4 hours; Protein; Add 1 proteinex with water flush TID    IV:    fentaNYL (SUBLIMAZE) infusion 150 mcg/hr (10/09/21 0521)    propofol 35 mcg/kg/min (10/09/21 0919)    cisatracurium (NIMBEX) infusion 1.5 mcg/kg/min (10/08/21 2104)    dextrose      dextrose      sodium chloride         Medications:  Scheduled Meds:   chlorhexidine  15 mL Mouth/Throat BID    insulin lispro  0-12 Units SubCUTAneous Q4H    docusate  100 mg Per NG tube BID    pantoprazole  40 mg IntraVENous Daily    And    sodium chloride (PF)  10 mL IntraVENous Daily    sodium chloride flush  5-40 mL IntraVENous 2 times per day    enoxaparin  30 mg SubCUTAneous BID    dexamethasone  6 mg Oral Daily    levothyroxine  75 mcg Oral Daily    clomiPHENE  50 mg Oral Daily    fluticasone  2 spray Each Nostril Daily    traZODone  50 mg Oral Nightly    budesonide-formoterol  2 puff Inhalation BID    tiotropium  2 puff Inhalation Daily    remdesivir IVPB  100 mg IntraVENous Q24H       PRN Meds:  glucose, dextrose, glucagon (rDNA), dextrose, hydrOXYzine, glucose, dextrose, glucagon (rDNA), dextrose, sodium chloride flush, sodium chloride, ondansetron **OR** ondansetron, polyethylene glycol, acetaminophen **OR** acetaminophen, albuterol sulfate HFA, HYDROcodone-homatropine        Radiology      CTA Chest W WO  (PE study)    Result Date: 10/1/2021  The EXAMINATION: CT scan of the chest with contrast (pulmonary embolism protocol) INDICATION: Chest pain and shortness of breath. COMPARISON: None TECHNIQUE: Helical CT was performed through the chest utilizing 100 cc of 370 intravenous contrast.  Images were obtained with bolus tracking in order to opacify the pulmonary arteries.   Both MIP and 3D volume rendered reconstructions were performed. FINDINGS: There is is a limited examination due to suboptimal opacification. Within limits examination no central or proximal pulmonary emboli. There is patchy multifocal to coalescent airspace disease diffusely throughout the lung parenchyma there is some scattered areas of atelectasis. No pleural effusions. No pneumothoraces. There is periaortic, pretracheal, parahilar and subcarinal adenopathy. Field-of-view visualized abdominal contents are unremarkable. There is multilevel degenerative changes of the thoracic spine superimposed upon a mild to moderate dorsal kyphosis     1. LIMITED EXAMINATION DUE TO SUBOPTIMAL OPACIFICATION. WITHIN LIMITS EXAMINATION NO CENTRAL OR PROXIMAL PULMONARY EMBOLI. 2. PATCHY MULTIFOCAL TO COALESCENT AIRSPACE DISEASE DIFFUSELY THROUGHOUT THE LUNG PARENCHYMA. COMMONLY REPORT IMAGING FEATURES OF (COVID-19) PNEUMONIA ARE PRESENT. OTHER PROCESSES SUCH AS INFLUENZA, PNEUMONIA AND ORGANIZING PNEUMONIA AS CAN BE SEEN WITH DRUG TOXICITY AND CONNECTIVE TISSUE DISEASE CAN CAUSE A SIMILAR IMAGING PATTERN. All CT scans at this facility use dose modulation, iterative reconstruction, and/or weight based dosing when appropriate to reduce radiation dose to as low as reasonably achievable. XR CHEST PORTABLE    Result Date: 10/9/2021  XR CHEST PORTABLE : 10/8/2021 CLINICAL HISTORY: Right chest tube placement. COMPARISON: Earlier 10/8/2021. TECHNIQUE: Two portable supine AP radiographs of the chest were obtained. FINDINGS: A right-sided chest tube has been placed along the right lung base, with apparent resolution of the previously demonstrated right pneumothorax. Endotracheal, orogastric tubes and a right internal jugular approach central venous catheter remain in expected positions. Moderately extensive pulmonary infiltrates have not significantly changed. INTERVAL PLACEMENT RIGHT CHEST TUBE IN EXPECTED POSITION.  APPARENT RESOLUTION OF THE PREVIOUSLY DEMONSTRATED RIGHT PNEUMOTHORAX. OTHERWISE, STABLE CHEST FROM EARLIER 10/8/2021. XR CHEST PORTABLE    Result Date: 10/8/2021  Exam: XR CHEST PORTABLE History: Intubation. Central catheter placement. Technique: AP portable view of the chest obtained. Comparison: Portable chest radiograph October 4, 2021 Findings: Endotracheal tube is present and terminates approximately 3 cm from the karl. Enteric tube traverses the diaphragm however its distal tip is outside of the field of view. Right internal jugular catheter is present terminates in the SVC. The cardiomediastinal silhouette is within normal limits. Right-sided pneumothorax, approximately 30%. Interval increase of opacities throughout the left lung. No pleural effusion. Right-sided pneumothorax. The patient's nurse Wiliam Hargrove was notified of this finding by Dr. Lianne Rodriguez at approximately 5:50 PM on 10/8/2021. Interval progression of left lung pneumonia. XR CHEST PORTABLE    Result Date: 10/4/2021  Exam: XR CHEST PORTABLE History: Worsening hypoxia. Technique: AP portable view of the chest obtained. Comparison: CT chest October 1, 2021 and portable chest radiograph September 29, 2021 Findings: Suboptimal inspiration. The cardiomediastinal silhouette is within normal limits. Bilateral airspace opacities are again identified and appear mildly worsened, possibly secondary to suboptimal inspiration. No pneumothorax or pleural effusion. No acute osseous abnormality. Bilateral pneumonia appears progressed when compared to prior chest radiograph of September 29, 2021, possibly in part secondary to suboptimal inspiration. XR CHEST PORTABLE    Result Date: 9/29/2021  Exam: XR CHEST PORTABLE History:  sob Technique: AP portable view of the chest obtained. Comparison: none Chest x-ray portable Findings: The cardiomediastinal silhouette is within normal limits. There are mild increased markings throughout both lungs.  . Bones of the thorax appear intact. Increased pulmonary markings throughout both lungs represent pulmonary edema, fluid overload or viral infection. Results:  CBC:   Recent Labs     10/07/21  0508 10/07/21  0508 10/08/21  1519 10/08/21  1740 10/09/21  0329 10/09/21  0547 10/09/21  0931   WBC 10.5  --  13.2*  --   --  13.6*  --    HGB 15.6   < > 16.0   < > 17.0 16.3 18.2*   HCT 46.7  --  48.6  --   --  50.4  --    MCV 81.5  --  81.8  --   --  82.1  --      --  310  --   --  281  --     < > = values in this interval not displayed.     :   Recent Labs     10/07/21  0508 10/07/21  0508 10/08/21  0532 10/08/21  1740 10/09/21  0329 10/09/21  0548 10/09/21  0931     --  136  --   --  134*  --    K 4.8  --  5.0*  --   --  4.3  --    CL 99  --  98  --   --  98  --    CO2 25  --  22  --   --  26  --    BUN 24*  --  21*  --   --  21*  --    CREATININE 0.93   < > 1.11   < > 1.1 1.08 1.1    < > = values in this interval not displayed. LIVER PROFILE:   Recent Labs     10/07/21  0508 10/08/21  0532 10/09/21  0548   AST 47* 42* 37   ALT 30 27 27   BILITOT 0.5 0.6 0.6   ALKPHOS 65 66 74       Assessment: This is a critically ill patient at risk of deterioration / death , needing close ICU monitoring and intervention due to below noted problems    ·  Severe acute hypoxic respiratory failure, on vent  · Severe COVID-19 infection  · Obstructive sleep apnea on CPAP at home  · Obesity  · Right-sided pneumothorax chest was chest tube placement   · history of asthma    Suggestion:  Patient is on a vent support with assist more. He is sedated with fentanyl propofol and Nimbex drip. He is on prone position. His O2 saturation still ranging from 91 to 92%. He is on 100% FiO2 and PEEP of 18. He has Metcalf  catheter , Patient is on DVT and GI prophylaxis  Restraint as needed. He had a right IJ placed yesterday and developed right-sided pneumothorax and chest tube was placed. Lungs he is reexpanded.   ABG this morning shows pH 7.38 PCO2 46 PO2 95 saturation 97 bicarb is 27. 5. Radha Zhang Critical care time spent reviewing labs/films, examining patient, collaborating with otherphysicians but excluding procedures for life threatening organ failure is 36 minutes. SIGNATURE: Chema Frederick MD, [facility]       4:10 PM   I talked to patient's wife and gave update about his condition she is aware about his critical condition.     Dr. Sagrario Alfaro

## 2021-10-09 NOTE — PROGRESS NOTES
Hospitalist Progress Note      PCP: Tyler Ardon MD    Date of Admission: 10/1/2021    Internal Hx/Subjective: Intubated 10/8 in setting of respiratory fatigue, failing maximal BiPAP support. Right IJ placed after intubation, with subsequent right iatrogenic pneumothorax, for which a right chest tube was placed for decompression, with good effect.    Sedation with fentanyl/propofol  Alternating proning      Medications:  Reviewed    Infusion Medications    fentaNYL (SUBLIMAZE) infusion 150 mcg/hr (10/09/21 0521)    propofol 35 mcg/kg/min (10/09/21 0919)    cisatracurium (NIMBEX) infusion 1.5 mcg/kg/min (10/08/21 2104)    dextrose      dextrose      sodium chloride       Scheduled Medications    chlorhexidine  15 mL Mouth/Throat BID    insulin lispro  0-12 Units SubCUTAneous Q4H    docusate  100 mg Per NG tube BID    pantoprazole  40 mg IntraVENous Daily    And    sodium chloride (PF)  10 mL IntraVENous Daily    sodium chloride flush  5-40 mL IntraVENous 2 times per day    enoxaparin  30 mg SubCUTAneous BID    dexamethasone  6 mg Oral Daily    levothyroxine  75 mcg Oral Daily    clomiPHENE  50 mg Oral Daily    fluticasone  2 spray Each Nostril Daily    traZODone  50 mg Oral Nightly    budesonide-formoterol  2 puff Inhalation BID    tiotropium  2 puff Inhalation Daily    remdesivir IVPB  100 mg IntraVENous Q24H     PRN Meds: glucose, dextrose, glucagon (rDNA), dextrose, hydrOXYzine, glucose, dextrose, glucagon (rDNA), dextrose, sodium chloride flush, sodium chloride, ondansetron **OR** ondansetron, polyethylene glycol, acetaminophen **OR** acetaminophen, albuterol sulfate HFA, HYDROcodone-homatropine      Intake/Output Summary (Last 24 hours) at 10/9/2021 1305  Last data filed at 10/9/2021 0554  Gross per 24 hour   Intake 2442.21 ml   Output 2850 ml   Net -407.79 ml       Exam:    BP (!) 185/123   Pulse 87   Temp 100 °F (37.8 °C) (Bladder)   Resp 30   Ht 6' 1\" (1.854 m)   Wt 267 lb 3.2 oz (121.2 kg)   SpO2 91%   BMI 35.25 kg/m²   In setting of COVID-19 pandemic in an interest of preventing further infection spread, physical exam composed of visual components from doorway, telemetry, and conversations with other active team members in ICU. General appearance:  Obese critically ill adult male, sedated, intubated, proned. Lungs: Intubated and mechanically ventilated at settings noted. SpO2 90% at time of exam.    Heart:  RRR, with HR 90 at time of exam.  A-line /94. Abdomen:  Obese. Extremities: No edema visually appreciated. Neurologic: Sedated. Labs:   Recent Labs     10/07/21  0508 10/07/21  0508 10/08/21  1519 10/08/21  1740 10/09/21  0329 10/09/21  0547 10/09/21  0931   WBC 10.5  --  13.2*  --   --  13.6*  --    HGB 15.6   < > 16.0   < > 17.0 16.3 18.2*   HCT 46.7  --  48.6  --   --  50.4  --      --  310  --   --  281  --     < > = values in this interval not displayed. Recent Labs     10/07/21  0508 10/07/21  0508 10/08/21  0532 10/08/21  1740 10/09/21  0329 10/09/21  0548 10/09/21  0931     --  136  --   --  134*  --    K 4.8  --  5.0*  --   --  4.3  --    CL 99  --  98  --   --  98  --    CO2 25  --  22  --   --  26  --    BUN 24*  --  21*  --   --  21*  --    CREATININE 0.93   < > 1.11   < > 1.1 1.08 1.1   CALCIUM 8.5  --  8.4*  --   --  8.3*  --     < > = values in this interval not displayed. Recent Labs     10/07/21  0508 10/08/21  0532 10/09/21  0548   AST 47* 42* 37   ALT 30 27 27   BILITOT 0.5 0.6 0.6   ALKPHOS 65 66 74     No results for input(s): INR in the last 72 hours. No results for input(s): Marques Laroseat in the last 72 hours. Urinalysis:    No results found for: Curtis Cowboy, BACTERIA, RBCUA, BLOODU, [address], [place] [address]    Radiology:  XR CHEST PORTABLE   Final Result      INTERVAL PLACEMENT RIGHT CHEST TUBE IN EXPECTED POSITION. APPARENT RESOLUTION OF THE PREVIOUSLY DEMONSTRATED RIGHT PNEUMOTHORAX.       [facility] Bas[facility] CHEST FROM EARLIER 10/8/2021. XR CHEST PORTABLE   Final Result      XR CHEST PORTABLE   Final Result      Bilateral pneumonia appears progressed when compared to prior chest radiograph of September 29, 2021, possibly in part secondary to suboptimal inspiration. CTA Chest W WO  (PE study)   Final Result   1. LIMITED EXAMINATION DUE TO SUBOPTIMAL OPACIFICATION. WITHIN LIMITS EXAMINATION NO CENTRAL OR PROXIMAL PULMONARY EMBOLI.   2. PATCHY MULTIFOCAL TO COALESCENT AIRSPACE DISEASE DIFFUSELY THROUGHOUT THE LUNG PARENCHYMA. COMMONLY REPORT IMAGING FEATURES OF (COVID-19) PNEUMONIA ARE PRESENT. OTHER PROCESSES SUCH AS INFLUENZA, PNEUMONIA AND ORGANIZING PNEUMONIA AS CAN BE SEEN WITH    DRUG TOXICITY AND CONNECTIVE TISSUE DISEASE CAN CAUSE A SIMILAR IMAGING PATTERN. All CT scans at this facility use dose modulation, iterative reconstruction, and/or weight based dosing when appropriate to reduce radiation dose to as low as reasonably achievable. XR CHEST PORTABLE    (Results Pending)           Assessment/Plan:    Summary  46 y.o. male with PMH of asthma, hypothyroidism, pituitary microadenoma, secondary hypogonadism, obesity, HARLEEN on CPAP, secondary polycythemia, insomnia who presented with:     Acute hypoxic respiratory failure  - due to COVID pneumonia  - requiring BiPAP support  - on Decadron, Remdesivir, Lovenox  -Tocilizumab per ID  - ID and pulmonology consulted   - Trending inflammatory markers, including ferritin. - Transferred to ICU the morning of 10/6/2021 in setting of progressive respiratory decompensation, currently on BiPAP with 12/6 and FiO2 100%. Family aware of need for possible intubation if worsening respiratory status. -10/8: Pulm/Crit team planning for elective intubation this afternoon in setting of progressive respiratory fatigue. Patient and wife in agreement with plan, per nursing. 10/9:  Intubated 10/8. RIJ CVC placed.   Subsequent right pneumothorax, decompressed with right chest tube. Nimbex paralysis. Hyperglycemia   - no history of DM, likely related to decadron utilization  - ISS, hypoglycemia protocol     Elevated Cr  - monitor renal function     Asthma  - continue home regimen     HARLEEN  -Currently continuous rescue BiPAP at present. Anxiety  -Atarax every 8 hours per ICU team.  Recommend no increase at present due to risk for sedation and worsening respiratory status. Hypothyroidism, hypogonadism, insomnia   - continue/hold home meds as ordered           Diet: Diet NPO  ADULT TUBE FEEDING; Orogastric; Peptide Based; Continuous; 20; No; 50; Q 4 hours; Protein; Add 1 proteinex with water flush TID  -n.p.o. while using BiPAP.   Likely initiate tube feeds s/p intubation - defer to ICU team.        Code Status: Full Code          Electronically signed by Juan Manuel Justin DO on 10/9/2021 at 1:05 PM

## 2021-10-09 NOTE — FLOWSHEET NOTE
Vent sedated with propofol fentanyl. Nimbex adjusted according to train of four. OG intact tube feed started at 20 cc hr.  Remains prone range of motion to all ext. Repositions head slightly  meliplex to pressure points.

## 2021-10-10 NOTE — PROGRESS NOTES
Infectious Diseases Inpatient Progress Note          HISTORY OF PRESENT ILLNESS:  Follow up COVID-19 pneumonia with acute respiratory failure and hypoxia on IV remdesivir, status post tocilizumab, well tolerated. Patient required to be intubated and sedated, currently on assist control 100%   Had left chest tube placed for spontaneous pneumothorax  Low-grade fevers   Remains on Decadron and Lovenox subcu   current Medications:     chlorhexidine  15 mL Mouth/Throat BID    insulin lispro  0-12 Units SubCUTAneous Q4H    docusate  100 mg Per NG tube BID    pantoprazole  40 mg IntraVENous Daily    And    sodium chloride (PF)  10 mL IntraVENous Daily    sodium chloride flush  5-40 mL IntraVENous 2 times per day    enoxaparin  30 mg SubCUTAneous BID    dexamethasone  6 mg Oral Daily    levothyroxine  75 mcg Oral Daily    clomiPHENE  50 mg Oral Daily    fluticasone  2 spray Each Nostril Daily    traZODone  50 mg Oral Nightly    budesonide-formoterol  2 puff Inhalation BID    tiotropium  2 puff Inhalation Daily    remdesivir IVPB  100 mg IntraVENous Q24H       Allergies:  Aleve [naproxen]      Review of Systems  unable to provide ROS because of sedation and intubation      Physical Exam  Vitals:    10/10/21 0700 10/10/21 0800 10/10/21 0809 10/10/21 0900   BP:       Pulse: 93 94 93 95   Resp:       Temp:    99.7 °F (37.6 °C)   TempSrc:    Bladder   SpO2: 93% 95% 95% 96%   Weight:       Height:         General Appearance: Sedated and intubated. Assist control 90%  Skin: warm and dry, no rash. Head: normocephalic and atraumatic  Eyes: anicteric sclerae  ENT:  normal mucous membranes.    Intact ET and OG  Lungs: Assisted ventilation with bilateral basilar rales, intact left chest tube  Heart normal S1-S2 no murmur  Abdomen: soft, no distention or rigidity  No leg edema  No erythema, no tenderness  Clear urine in Metcalf    DATA:    Lab Results   Component Value Date    WBC 13.6 (H) 10/10/2021    HGB 18.1 (H) 10/10/2021    HCT 50.8 10/10/2021    MCV 82.0 10/10/2021     10/10/2021     Lab Results   Component Value Date    CREATININE 0.9 10/10/2021    BUN 26 (H) 10/10/2021     10/10/2021    K 4.6 10/10/2021    CL 98 10/10/2021    CO2 27 10/10/2021       Hepatic Function Panel:  Lab Results   Component Value Date    ALKPHOS 88 10/10/2021    ALT 28 10/10/2021    AST 34 10/10/2021    PROT 5.5 10/10/2021    BILITOT 0.5 10/10/2021    LABALBU 3.0 10/10/2021     Normal procalcitonin  Normal D-dimer     Impression       Bilateral pneumonia appears progressed when compared to prior chest radiograph of September 29, 2021, possibly in part secondary to suboptimal inspiration.         10/4/2021  6:29 PM - Benson, Chpo Incoming Lab Results From Soft    Component Value Ref Range & Units Status Collected Lab   Ferritin 2,022.0High   30.0 - 400.0 ng/mL Final       10/7/2021  6:08 AM - Benson, Chpo Incoming Lab Results From Soft    Component Value Ref Range & Units Status Collected Lab   Ferritin 1,500.0High   30.0 - 400.0 ng/mL Final 10/07/2021  5:08 AM [address]      10/8/2021 10:49 AM - Benson, Chpo Incoming Lab Results From Soft    Component Value Ref Range & Units Status Collected Lab   Ferritin 1,408. 0High   30.0 - 400.0 ng/mL        10/9/2021  6:45 AM - Benson, Chpo Incoming Lab Results From Soft    Component Value Ref Range & Units Status Collected Lab   D-Dimer, Quant 2. 75High Panic   0.00 - 0.50 mg/L FEU Final 10/09/2021  5:48 AM [facility] - [facility]   VTE (DVT or PE) cut-off = 0.50 mg/L FEU   Testing Performed By    10/9/2021  2:01 PM - Benson, Chpo Incoming Lab Results From Soft    Component Value Ref Range & Units Status Collected Lab   Ferritin 1,437.0High   30.0 - 400.0 ng/mL Final 10/09/2021  5:48 AM [facility]   Testing Performed By    Maria Sweet Name Director Address     10/8/2021  4:35 PM - Milka Knowles Incoming Lab Results From Soft    Component Value Ref Range & Units Status Collected Lab   Procalcitonin 0.14  0.00 - 0.15 ng/mL Final 10/08/2021  3:19 PM [address] Lab   Suspected Sepsis:            IMPRESSION:    · Critical COVID-19 pneumonia, worsening  · Acute respiratory failure with severe hypoxia, requiring intubation  · Hypercoagulable state  · Probable cytokine storm  · History of asthma    Patient Active Problem List   Diagnosis    Seasonal allergies    Acquired hypothyroidism    Hypogonadism, male    Hyperlipidemia    Chronic bilateral low back pain without sciatica    Intermittent asthma    Irritable bowel syndrome with diarrhea    Pneumonia due to 2019 novel coronavirus    Acute respiratory failure with hypoxia (HCC)       PLAN:  · Status post Actemra 640 mg IV on 10/05  · 10 days remdesivir, start date 10/01, to be stopped after today's dose  · Decadron and anticoagulation as ordered  · Follow-up CBC complete metabolic profile  · Vent support and weaning as tolerated  · Continue ICU monitoring  · Reevaluate if febrile or hypotensive.  Follow-up procalcitonin in 1 to 2 days      Kisha Munson MD

## 2021-10-10 NOTE — CARE COORDINATION
INTERDISCIPLINARY ROUNDING    October 10, 2021 at 12:55 PM EDT    Anticipated Discharge Date:       Anticipated Discharge Disposition: PENDING PROGRESS    Patient Mobility or PT/OT ordered: N/A    Readmission Risk              Risk of Unplanned Readmission:  14           Discussed patient goal for the day, patient clinical progression, and barriers to discharge. The following Goal(s) of the Day/Commitment(s) have been identified:  PT REMAINS INTUBATED, ON MULTIPLE GTT, HAS CHEST TUBE. PT WILL NEED PT/OT ONCE ABLE.  LSW/CM TO FOLLOW FOR DC NEEDS.        Mis Almaraz RN  October 10, 2021

## 2021-10-10 NOTE — PROGRESS NOTES
Spiritual Care Services     Summary of Visit:  Phone conversation with the patient's wife - Jacqueline Chain. Per CM ACP notes she is his primary medical decision maker. Per Jacqueline Chain, patient is full code and is in agreement with ventilation and if necessary, CPR. They are active members of the [place] [facility]. Their Gnosticism along with many others in state and out of state are praying for Minnie Granados. This  was able to encourage and pray with patient's wife. She is planning on coming up to see him today. Spiritual Assessment/Intervention/Outcomes:    Encounter Summary  Services provided to[de-identified] Family  Referral/Consult From[de-identified] [facility]  Support System: Spouse, Mormon/maryanne community  Place of Anglican: [address] [place] [address],Third Floor Mormon: Completed  Continue Visiting: Yes  Complexity of Encounter: Moderate  Length of Encounter: 15 minutes  Spiritual Assessment Completed: Yes  Advance Care Planning: Yes (ACP Note)  Routine  Type: Follow up  Assessment: Calm, Approachable, Coping, Hopeful  Intervention: Active listening, Explored feelings, thoughts, concerns, Nurtured hope, Empowerment, Prayer, Discussed relationship with God, Discussed belief system/Evangelical practices/maryanne, Discussed illness/injury and it's impact  Outcome: Comfort, Acceptance, Expressed gratitude, Engaged in conversation, Expressed feelings/needs/concerns, Encouraged                          Values / Beliefs  Do you have any ethnic, cultural, sacramental, or spiritual Evangelical needs you would like us to be aware of while you are in the hospital?: No [facility])    Care Plan:    Continue to provide spiritual support and encouragement to patient and family.     Spiritual Care Services   Electronically signed by Leatha Dvaidson on 10/10/21 at 10:55 AM EDT     To reach a  for emotional and spiritual support, place an [facility] consult request.   If a  is needed immediately, dial 0 and ask to page the on-call

## 2021-10-10 NOTE — PROGRESS NOTES
Hospitalist Progress Note      PCP: Veronique Patel MD    Date of Admission: 10/1/2021    Chief Complaint:    Chief Complaint   Patient presents with    Shortness of Breath     patient with shortness of breath covid positive 87 percent Ra     Subjective:  Patient is intubated and pone; unable to complete full 12 point ROS     Medications:  Reviewed    Infusion Medications    fentaNYL (SUBLIMAZE) infusion 100 mcg/hr (10/10/21 0012)    propofol 35 mcg/kg/min (10/10/21 0940)    cisatracurium (NIMBEX) infusion 2.2 mcg/kg/min (10/10/21 0007)    dextrose      dextrose      sodium chloride       Scheduled Medications    chlorhexidine  15 mL Mouth/Throat BID    insulin lispro  0-12 Units SubCUTAneous Q4H    docusate  100 mg Per NG tube BID    pantoprazole  40 mg IntraVENous Daily    And    sodium chloride (PF)  10 mL IntraVENous Daily    sodium chloride flush  5-40 mL IntraVENous 2 times per day    enoxaparin  30 mg SubCUTAneous BID    dexamethasone  6 mg Oral Daily    levothyroxine  75 mcg Oral Daily    clomiPHENE  50 mg Oral Daily    fluticasone  2 spray Each Nostril Daily    traZODone  50 mg Oral Nightly    budesonide-formoterol  2 puff Inhalation BID    tiotropium  2 puff Inhalation Daily    remdesivir IVPB  100 mg IntraVENous Q24H     PRN Meds: glucose, dextrose, glucagon (rDNA), dextrose, hydrOXYzine, glucose, dextrose, glucagon (rDNA), dextrose, sodium chloride flush, sodium chloride, ondansetron **OR** ondansetron, polyethylene glycol, acetaminophen **OR** acetaminophen, albuterol sulfate HFA, HYDROcodone-homatropine      Intake/Output Summary (Last 24 hours) at 10/10/2021 1311  Last data filed at 10/10/2021 1200  Gross per 24 hour   Intake 2169.49 ml   Output 1685 ml   Net 484.49 ml     Exam:    BP (!) 185/123   Pulse 115   Temp 99.5 °F (37.5 °C) (Bladder)   Resp 30   Ht 6' 1\" (1.854 m)   Wt 267 lb 3.2 oz (121.2 kg)   SpO2 97%   BMI 35.25 kg/m²     General appearance: intubated and prone  HEENT: Conjunctivae/corneas clear. Neck: Trachea midline. Respiratory:  Ventilated  Cardiovascular: Tachycardic  Abdomen: Soft, non-tender, non-distended with normal bowel sounds. Musculoskeletal: No clubbing, cyanosis or edema bilaterally. Neuro: sedated  Capillary Refill: Brisk,< 3 seconds   Peripheral Pulses: +2 palpable, equal bilaterally     Labs:   Recent Labs     10/08/21  1519 10/08/21  1740 10/09/21  0547 10/09/21  0931 10/10/21  0320 10/10/21  0600 10/10/21  1016   WBC 13.2*  --  13.6*  --   --  13.6*  --    HGB 16.0   < > 16.3   < > 18.8* 16.6 18.1*   HCT 48.6  --  50.4  --   --  50.8  --      --  281  --   --  261  --     < > = values in this interval not displayed. Recent Labs     10/08/21  0532 10/08/21  1740 10/09/21  0548 10/09/21  0931 10/10/21  0320 10/10/21  0600 10/10/21  1016     --  134*  --   --  136  --    K 5.0*  --  4.3  --   --  4.6  --    CL 98  --  98  --   --  98  --    CO2 22  --  26  --   --  27  --    BUN 21*  --  21*  --   --  26*  --    CREATININE 1.11   < > 1.08   < > 1.1 0.99 0.9   CALCIUM 8.4*  --  8.3*  --   --  8.3*  --     < > = values in this interval not displayed. Recent Labs     10/08/21  0532 10/09/21  0548 10/10/21  0600   AST 42* 37 34   ALT 27 27 28   BILITOT 0.6 0.6 0.5   ALKPHOS 66 74 88     No results for input(s): INR in the last 72 hours. No results for input(s): Chante Shanks in the last 72 hours. Urinalysis:    No results found for: Sydelle Shakir, BACTERIA, RBCUA, BLOODU, [address], [place] [address]    Radiology:  XR CHEST PORTABLE   Final Result   There are alveolar opacities which may represent early infiltrates, pneumonia. The differential includes COVID-19 pneumonia versus other etiologies            XR CHEST PORTABLE   Final Result      INTERVAL PLACEMENT RIGHT CHEST TUBE IN EXPECTED POSITION. APPARENT RESOLUTION OF THE PREVIOUSLY DEMONSTRATED RIGHT PNEUMOTHORAX. OTHERWISE, STABLE CHEST FROM EARLIER 10/8/2021. XR CHEST PORTABLE   Final Result      XR CHEST PORTABLE   Final Result      Bilateral pneumonia appears progressed when compared to prior chest radiograph of September 29, 2021, possibly in part secondary to suboptimal inspiration. CTA Chest W WO  (PE study)   Final Result   1. LIMITED EXAMINATION DUE TO SUBOPTIMAL OPACIFICATION. WITHIN LIMITS EXAMINATION NO CENTRAL OR PROXIMAL PULMONARY EMBOLI.   2. PATCHY MULTIFOCAL TO COALESCENT AIRSPACE DISEASE DIFFUSELY THROUGHOUT THE LUNG PARENCHYMA. COMMONLY REPORT IMAGING FEATURES OF (COVID-19) PNEUMONIA ARE PRESENT. OTHER PROCESSES SUCH AS INFLUENZA, PNEUMONIA AND ORGANIZING PNEUMONIA AS CAN BE SEEN WITH    DRUG TOXICITY AND CONNECTIVE TISSUE DISEASE CAN CAUSE A SIMILAR IMAGING PATTERN. All CT scans at this facility use dose modulation, iterative reconstruction, and/or weight based dosing when appropriate to reduce radiation dose to as low as reasonably achievable. XR CHEST PORTABLE    (Results Pending)     Assessment/Plan:    #Acute hypoxic respiratory failure secondary to COVID 19 PNA    - continue meds and management per pulm and ID; currently intubated and prone    #Hyperglycemia    - SSI    #Right sided pneumothorax    - R sided chest tube placed    #HARLEEN     - CPAP at home    C/Em [address] Problems    Diagnosis Date Noted    Hypercoagulable state [facility]) [D68.59]     Acute respiratory failure with hypoxia ([facility] [address] [J96.01]     Pneumonia due to 2019 novel coronavirus [U07.1, J12.82] 10/01/2021    Intermittent asthma [J45.20] 01/09/2018     Additional work up or/and treatment plan may be added today or then after based on clinical progression. I am managing a portion of pt care. Some medical issues are handled by other specialists. Additional work up and treatment should be done in out pt setting by pt PCP and other out pt providers.      In addition to examining and evaluating pt, I spent additional time explaining care, normal and abnormal findings, and treatment plan. All of pt questions were answered. Counseling, diet and education were  provided. Case will be discussed with nursing staff when appropriate. Family will be updated if and when appropriate. Diet: Diet NPO  ADULT TUBE FEEDING; Orogastric; Peptide Based; Continuous; 20; No; 50; Q 4 hours; Protein;  Add 1 proteinex with water flush TID    Code Status: Full Code    PT/OT Eval     Electronically signed by Adin Dudley MD on 10/10/2021 at 1:11 PM

## 2021-10-10 NOTE — PROGRESS NOTES
Pulmonary ICU Progress Note    PRIMARY SERVICE: Pulmonary Disease    INTERVAL HPI: Patient seen and examined at bedside, Interval Notes, orders reviewed. Nursing notes noted    Patient is on vent Support with AC mode, rate 30 tidal volume 450 FiO2 90% PEEP of 18  Patient is on sedation with diprivan, propofol and Nimbex drip. He is on 100% FiO2 his O2 saturation is 96 %. His temperature is 99.7 °F.    No vomiting or diarrhea. Chest tube in place. Chest x-ray done yesterday showing alveolar opacity secondary to pneumonia. .Patient is in prone position. Review of Systems   sedated unable to obtain. Intake/Output Summary (Last 24 hours) at 10/10/2021 1211  Last data filed at 10/10/2021 0902  Gross per 24 hour   Intake 2169.49 ml   Output 1385 ml   Net 784.49 ml       Vitals:  BP (!) 185/123   Pulse 95   Temp 99.7 °F (37.6 °C) (Bladder)   Resp 30   Ht 6' 1\" (1.854 m)   Wt 267 lb 3.2 oz (121.2 kg)   SpO2 96%   BMI 35.25 kg/m²   EXAM:  General: Obese, orally intubated, sedated, comfortable in bed, No distress. Head: Atraumatic ,Normocephalic   Eyes: PERRL. No sclera icterus. No conjunctival injection. No discharge   ENT: No nasal  discharge. Pharynx clear. Neck:  Trachea midline. No thyromegaly, no JVD, No cervical adenopathy. Resp : Normal effort,  No accessory muscle use. No Rales. No wheezing. No rhonchi. CV: Normal  rate. Regular rhythm. No mumur ,  Rub or gallop  ABD: Non-tender. Non-distended. No masses. No organmegaly. Normal bowel sounds. No hernia.   EXT: No Pitting, No Cyanosis No clubbing  CNS: Sedated      ABG:     Lab Results   Component Value Date    PHART 7.334 10/10/2021    DRN1NFL 53 10/10/2021    PO2ART 55 10/10/2021    KGL5YMI 28.3 10/10/2021    BEART 2 10/10/2021    A5WMNAXI 86 10/10/2021     Lab Results   Component Value Date    LACTA 1.3 09/29/2021     O2 Device: Ventilator  O2 Flow Rate (L/min): 12 L/min    MV Settings:     Vent Mode: AC/VC  Vt Ordered: 450 mL  Rate Set: 30 bmp  FiO2 : 90 %  PEEP/CPAP: 16  Peak Inspiratory Pressure: 39 cmH2O  Plateau Pressure: 29 cmH20  Mean Airway Pressure: 23 cmH20  I:E Ratio: 1:1.40    Diet NPO  ADULT TUBE FEEDING; Orogastric; Peptide Based; Continuous; 20; No; 50; Q 4 hours; Protein; Add 1 proteinex with water flush TID    IV:    fentaNYL (SUBLIMAZE) infusion 100 mcg/hr (10/10/21 0012)    propofol 35 mcg/kg/min (10/10/21 0940)    cisatracurium (NIMBEX) infusion 2.2 mcg/kg/min (10/10/21 0007)    dextrose      dextrose      sodium chloride         Medications:  Scheduled Meds:   chlorhexidine  15 mL Mouth/Throat BID    insulin lispro  0-12 Units SubCUTAneous Q4H    docusate  100 mg Per NG tube BID    pantoprazole  40 mg IntraVENous Daily    And    sodium chloride (PF)  10 mL IntraVENous Daily    sodium chloride flush  5-40 mL IntraVENous 2 times per day    enoxaparin  30 mg SubCUTAneous BID    dexamethasone  6 mg Oral Daily    levothyroxine  75 mcg Oral Daily    clomiPHENE  50 mg Oral Daily    fluticasone  2 spray Each Nostril Daily    traZODone  50 mg Oral Nightly    budesonide-formoterol  2 puff Inhalation BID    tiotropium  2 puff Inhalation Daily    remdesivir IVPB  100 mg IntraVENous Q24H       PRN Meds:  glucose, dextrose, glucagon (rDNA), dextrose, hydrOXYzine, glucose, dextrose, glucagon (rDNA), dextrose, sodium chloride flush, sodium chloride, ondansetron **OR** ondansetron, polyethylene glycol, acetaminophen **OR** acetaminophen, albuterol sulfate HFA, HYDROcodone-homatropine        Radiology      CTA Chest W WO  (PE study)    Result Date: 10/1/2021  The EXAMINATION: CT scan of the chest with contrast (pulmonary embolism protocol) INDICATION: Chest pain and shortness of breath. COMPARISON: None TECHNIQUE: Helical CT was performed through the chest utilizing 100 cc of 370 intravenous contrast.  Images were obtained with bolus tracking in order to opacify the pulmonary arteries.   Both MIP and 3D volume rendered reconstructions were performed. FINDINGS: There is is a limited examination due to suboptimal opacification. Within limits examination no central or proximal pulmonary emboli. There is patchy multifocal to coalescent airspace disease diffusely throughout the lung parenchyma there is some scattered areas of atelectasis. No pleural effusions. No pneumothoraces. There is periaortic, pretracheal, parahilar and subcarinal adenopathy. Field-of-view visualized abdominal contents are unremarkable. There is multilevel degenerative changes of the thoracic spine superimposed upon a mild to moderate dorsal kyphosis     1. LIMITED EXAMINATION DUE TO SUBOPTIMAL OPACIFICATION. WITHIN LIMITS EXAMINATION NO CENTRAL OR PROXIMAL PULMONARY EMBOLI. 2. PATCHY MULTIFOCAL TO COALESCENT AIRSPACE DISEASE DIFFUSELY THROUGHOUT THE LUNG PARENCHYMA. COMMONLY REPORT IMAGING FEATURES OF (COVID-19) PNEUMONIA ARE PRESENT. OTHER PROCESSES SUCH AS INFLUENZA, PNEUMONIA AND ORGANIZING PNEUMONIA AS CAN BE SEEN WITH DRUG TOXICITY AND CONNECTIVE TISSUE DISEASE CAN CAUSE A SIMILAR IMAGING PATTERN. All CT scans at this facility use dose modulation, iterative reconstruction, and/or weight based dosing when appropriate to reduce radiation dose to as low as reasonably achievable. XR CHEST PORTABLE    Result Date: 10/9/2021  XR CHEST PORTABLE : 10/8/2021 CLINICAL HISTORY: Right chest tube placement. COMPARISON: Earlier 10/8/2021. TECHNIQUE: Two portable supine AP radiographs of the chest were obtained. FINDINGS: A right-sided chest tube has been placed along the right lung base, with apparent resolution of the previously demonstrated right pneumothorax. Endotracheal, orogastric tubes and a right internal jugular approach central venous catheter remain in expected positions. Moderately extensive pulmonary infiltrates have not significantly changed. INTERVAL PLACEMENT RIGHT CHEST TUBE IN EXPECTED POSITION.  APPARENT RESOLUTION OF THE PREVIOUSLY DEMONSTRATED RIGHT PNEUMOTHORAX. OTHERWISE, STABLE CHEST FROM EARLIER 10/8/2021. XR CHEST PORTABLE    Result Date: 10/8/2021  Exam: XR CHEST PORTABLE History: Intubation. Central catheter placement. Technique: AP portable view of the chest obtained. Comparison: Portable chest radiograph October 4, 2021 Findings: Endotracheal tube is present and terminates approximately 3 cm from the karl. Enteric tube traverses the diaphragm however its distal tip is outside of the field of view. Right internal jugular catheter is present terminates in the SVC. The cardiomediastinal silhouette is within normal limits. Right-sided pneumothorax, approximately 30%. Interval increase of opacities throughout the left lung. No pleural effusion. Right-sided pneumothorax. The patient's nurse Donnie Severe was notified of this finding by Dr. Leana Yo at approximately 5:50 PM on 10/8/2021. Interval progression of left lung pneumonia. XR CHEST PORTABLE    Result Date: 10/4/2021  Exam: XR CHEST PORTABLE History: Worsening hypoxia. Technique: AP portable view of the chest obtained. Comparison: CT chest October 1, 2021 and portable chest radiograph September 29, 2021 Findings: Suboptimal inspiration. The cardiomediastinal silhouette is within normal limits. Bilateral airspace opacities are again identified and appear mildly worsened, possibly secondary to suboptimal inspiration. No pneumothorax or pleural effusion. No acute osseous abnormality. Bilateral pneumonia appears progressed when compared to prior chest radiograph of September 29, 2021, possibly in part secondary to suboptimal inspiration. XR CHEST PORTABLE    Result Date: 9/29/2021  Exam: XR CHEST PORTABLE History:  sob Technique: AP portable view of the chest obtained. Comparison: none Chest x-ray portable Findings: The cardiomediastinal silhouette is within normal limits. There are mild increased markings throughout both lungs. . Bones of the thorax appear intact.      Increased pulmonary markings throughout both lungs represent pulmonary edema, fluid overload or viral infection. Results:  CBC:   Recent Labs     10/08/21  1519 10/08/21  1740 10/09/21  0547 10/09/21  0931 10/10/21  0320 10/10/21  0600 10/10/21  1016   WBC 13.2*  --  13.6*  --   --  13.6*  --    HGB 16.0   < > 16.3   < > 18.8* 16.6 18.1*   HCT 48.6  --  50.4  --   --  50.8  --    MCV 81.8  --  82.1  --   --  82.0  --      --  281  --   --  261  --     < > = values in this interval not displayed.     :   Recent Labs     10/08/21  0532 10/08/21  1740 10/09/21  0548 10/09/21  0931 10/10/21  0320 10/10/21  0600 10/10/21  1016     --  134*  --   --  136  --    K 5.0*  --  4.3  --   --  4.6  --    CL 98  --  98  --   --  98  --    CO2 22  --  26  --   --  27  --    BUN 21*  --  21*  --   --  26*  --    CREATININE 1.11   < > 1.08   < > 1.1 0.99 0.9    < > = values in this interval not displayed. LIVER PROFILE:   Recent Labs     10/08/21  0532 10/09/21  0548 10/10/21  0600   AST 42* 37 34   ALT 27 27 28   BILITOT 0.6 0.6 0.5   ALKPHOS 66 74 88       Assessment: This is a critically ill patient at risk of deterioration / death , needing close ICU monitoring and intervention due to below noted problems    1. Severe acute hypoxic respiratory failure, on vent  2. Severe COVID-19 infection  3. Obstructive sleep apnea on CPAP at home  4. Obesity  5. Right-sided pneumothorax chest was chest tube placement   6. history of asthma    Suggestion:  Patient is on a vent support with assist control mode. He is sedated with fentanyl propofol and Nimbex drip. He is on prone position. O2 saturation is 96%. He is on 100% FiO2 and PEEP of 18. He has Metcalf  catheter , Patient is on DVT and GI prophylaxis. Restraint as needed. ABG done today shows pH 7.33 PCO2 53 PO2 55 saturation 86 bicarb 28.3.   Chest x-ray from today is pending critical care time spent reviewing labs/films, examining patient, collaborating with other physicians but excluding procedures for life threatening organ failure is 35 minutes.   Discussed with patient wife and gave update    SIGNATURE: Brittni Farrar MD, FCCP      Dr. Olga Tarango

## 2021-10-11 NOTE — PROGRESS NOTES
Pt remains febrile despite given tylenol, 38.7. Oral care given and noted copious secretions from the nares.

## 2021-10-11 NOTE — PROGRESS NOTES
Infectious Diseases Inpatient Progress Note          HISTORY OF PRESENT ILLNESS:  Follow up COVID-19 pneumonia with acute respiratory failure and hypoxia on IV remdesivir, status post tocilizumab, well tolerated. Patient required to be intubated and sedated, currently on assist control 100%   Patient's remains to be unstable, tachycardic. Good urine output. Currently in a prone position  Had R chest tube placed for spontaneous pneumothorax  . Patient has persistent fever. Was started on IV vancomycin and meropenem  Remains on Decadron and Lovenox subcu   current Medications:     vancomycin (VANCOCIN) intermittent dosing (placeholder)   Other RX Placeholder    vancomycin  1,250 mg IntraVENous Q12H    meropenem  1,000 mg IntraVENous Q8H    chlorhexidine  15 mL Mouth/Throat BID    [Held by provider] insulin lispro  0-12 Units SubCUTAneous Q4H    docusate  100 mg Per NG tube BID    pantoprazole  40 mg IntraVENous Daily    And    sodium chloride (PF)  10 mL IntraVENous Daily    sodium chloride flush  5-40 mL IntraVENous 2 times per day    enoxaparin  30 mg SubCUTAneous BID    levothyroxine  75 mcg Oral Daily    clomiPHENE  50 mg Oral Daily    [Held by provider] traZODone  50 mg Oral Nightly    [Held by provider] budesonide-formoterol  2 puff Inhalation BID    [Held by provider] tiotropium  2 puff Inhalation Daily       Allergies:  Aleve [naproxen]      Review of Systems  unable to provide ROS because of sedation and intubation      Physical Exam  Vitals:    10/11/21 1115 10/11/21 1130 10/11/21 1145 10/11/21 1200   BP:       Pulse: 122 121 121    Resp:       Temp:    101.1 °F (38.4 °C)   TempSrc:       SpO2: 91% 91% 91%    Weight:       Height:         General Appearance: Sedated and intubated. Assist control 90%  Skin: warm and dry, no rash. Head: normocephalic and atraumatic  Eyes: anicteric sclerae  ENT:  normal mucous membranes.    Intact ET and OG  Lungs: Assisted ventilation with bilateral basilar rales, intact R chest tube  Heart normal S1-S2 no murmur  Abdomen: soft, no distention or rigidity  No leg edema  No erythema, no tenderness  Clear urine in Metcalf    DATA:    Lab Results   Component Value Date    WBC 23.4 (H) 10/11/2021    HGB 20.0 (H) 10/11/2021    HCT 52.7 (H) 10/11/2021    MCV 83.3 10/11/2021     10/11/2021     Lab Results   Component Value Date    CREATININE 1.4 (H) 10/11/2021    BUN 33 (H) 10/11/2021     (L) 10/11/2021    K 5.9 (H) 10/11/2021    CL 94 (L) 10/11/2021    CO2 27 10/11/2021       Hepatic Function Panel:  Lab Results   Component Value Date    ALKPHOS 111 10/11/2021    ALT 33 10/11/2021    AST 39 10/11/2021    PROT 6.1 10/11/2021    BILITOT 0.4 10/11/2021    LABALBU 3.1 10/11/2021     Normal procalcitonin  Normal D-dimer     Impression       Bilateral pneumonia appears progressed when compared to prior chest radiograph of September 29, 2021, possibly in part secondary to suboptimal inspiration.         10/4/2021  6:29 PM - Benson, Chpo Incoming Lab Results From Soft    Component Value Ref Range & Units Status Collected Lab   Ferritin 2,022.0High   30.0 - 400.0 ng/mL Final       10/7/2021  6:08 AM - Benson, Chpo Incoming Lab Results From Soft    Component Value Ref Range & Units Status Collected Lab   Ferritin 1,500.0High   30.0 - 400.0 ng/mL Final 10/07/2021  5:08 AM [facility] - [facility]      10/8/2021 10:49 AM - Benson, Chpo Incoming Lab Results From Soft    Component Value Ref Range & Units Status Collected Lab   Ferritin 1,408. 0High   30.0 - 400.0 ng/mL        10/9/2021  6:45 AM - Benson, Chpo Incoming Lab Results From Soft    Component Value Ref Range & Units Status Collected Lab   D-Dimer, Quant 2. 75High Panic   0.00 - 0.50 mg/L FEU Final 10/09/2021  5:48 AM [facility] - [facility] Lab   VTE (DVT or PE) cut-off = 0.50 mg/L FEU   Testing Performed By    10/9/2021  2:01 PM - Milka Knowles Incoming Lab Results From Soft    Component Value Ref Range & Units Status Collected Lab   Ferritin 1,437.0High   30.0 - 400.0 ng/mL Final 10/09/2021  5:48 AM  - [facility] Lab   Testing Performed By    Maria Kameron Micki Name Director Address     10/8/2021  4:35 PM - Milka Knowles Incoming Lab Results From Soft    Component Value Ref Range & Units Status Collected Lab   Procalcitonin 0.14  0.00 - 0.15 ng/mL Final 10/08/2021  3:19 PM [address] [facility]   Suspected Sepsis:            IMPRESSION:    · Critical COVID-19 pneumonia, worsening  · Acute respiratory failure with severe hypoxia, requiring intubation  · Hypercoagulable state  · Probable cytokine storm  · History of asthma    Patient Active Problem List   Diagnosis    Seasonal allergies    Acquired hypothyroidism    Hypogonadism, male    Hyperlipidemia    Chronic bilateral low back pain without sciatica    Intermittent asthma    Irritable bowel syndrome with diarrhea    Pneumonia due to 2019 novel coronavirus    Acute respiratory failure with hypoxia (HCC)    Hypercoagulable state (HCC)       PLAN:  · Blood cultures  · Sputum culture  · Urinalysis with reflex culture  · status post Actemra 640 mg IV on 10/05  · Status post 10 days remdesivir, start date 10/01  · Decadron and anticoagulation as ordered  · Follow-up CBC complete metabolic profile  · Vent support and weaning as tolerated  · Continue ICU monitoring  · Agree with transfer to a tertiary care facility for ECMO      Alejandra Sweet MD

## 2021-10-11 NOTE — PROGRESS NOTES
Pharmacy Note  Vancomycin Consult    Ede Crocker is a 46 y.o. male started on Vancomycin for empiric therapy COVID-19 ventilated patient; consult received from  Diana Jimenez NP to manage therapy. Also receiving the following antibiotics: none    Hypoxia [R09.02]  Dyspnea, unspecified type [R06.00]  Pneumonia due to 2019 novel coronavirus [U07.1, J12.82]  COVID-19 [U07.1]  Allergies: Aleve [naproxen]    Temp max: 100.9    Cultures  Recent Labs     10/03/21  1415 09/29/21  1741 09/29/21  1428   BC  --   --  No growth after 5 days of incubation. BLOODCULT2  --   --  No growth after 5 days of incubation. LABGRAM Many WBC's  Few epithelial cells  Mixed Respiratory Sophie with  Moderate Gram positive cocci in clusters-resembling Staph  Few Yeast  Rare Gram negative rods    --   --    ORG Yeast*  --   --    COVID19  --  Detected*  --      Height: 6' 1\" (185.4 cm), Weight: 277 lb 5.4 oz (125.8 kg), Body mass index is 36.59 kg/m². MRSA Nasal swab:NA    Recent Labs     10/11/21  0853   CREATININE 1.4*   Estimated Creatinine Clearance: 87 mL/min (A) (based on SCr of 1.4 mg/dL (H)). .    Goal Trough Level: 15 mcg/mL -600    Assessment/Plan:  Will initiate Vancomycin with a one time loading dose of 1250 mg x1, followed by 1250 mg IV every 12 hours. [employer] predicts AUC/LACY of 456 and steady state trough of 14.8. Will draw random level in 24 hours to assess dosing. Timing of future trough levels may be adjusted based on culture results, renal function, and clinical response. Thank you for the consult. Will continue to follow.     Leticia Pennington [facility]  10/11/2021  10:37 AM

## 2021-10-11 NOTE — PROGRESS NOTES
Hospitalist Progress Note      PCP: Doreen Juarez MD    Date of Admission: 10/1/2021    Chief Complaint:    Chief Complaint   Patient presents with    Shortness of Breath     patient with shortness of breath covid positive 87 percent Ra     Subjective:  Patient is intubated and pone; unable to complete full 12 point ROS     Medications:  Reviewed    Infusion Medications    insulin 9.5 Units/hr (10/11/21 1215)    fentaNYL (SUBLIMAZE) infusion 125 mcg/hr (10/11/21 0530)    propofol 40 mcg/kg/min (10/11/21 1406)    cisatracurium (NIMBEX) infusion 2.2 mcg/kg/min (10/10/21 2345)    dextrose      sodium chloride       Scheduled Medications    vancomycin (VANCOCIN) intermittent dosing (placeholder)   Other RX Placeholder    vancomycin  1,250 mg IntraVENous Q12H    meropenem  1,000 mg IntraVENous Q8H    chlorhexidine  15 mL Mouth/Throat BID    [Held by provider] insulin lispro  0-12 Units SubCUTAneous Q4H    docusate  100 mg Per NG tube BID    pantoprazole  40 mg IntraVENous Daily    And    sodium chloride (PF)  10 mL IntraVENous Daily    sodium chloride flush  5-40 mL IntraVENous 2 times per day    enoxaparin  30 mg SubCUTAneous BID    levothyroxine  75 mcg Oral Daily    clomiPHENE  50 mg Oral Daily    [Held by provider] traZODone  50 mg Oral Nightly    [Held by provider] budesonide-formoterol  2 puff Inhalation BID    [Held by provider] tiotropium  2 puff Inhalation Daily     PRN Meds: labetalol, glucose, glucagon (rDNA), hydrOXYzine, dextrose, dextrose, sodium chloride flush, sodium chloride, ondansetron **OR** ondansetron, polyethylene glycol, acetaminophen **OR** acetaminophen, albuterol sulfate HFA, HYDROcodone-homatropine      Intake/Output Summary (Last 24 hours) at 10/11/2021 1416  Last data filed at 10/11/2021 0530  Gross per 24 hour   Intake 2411.71 ml   Output 1030 ml   Net 1381.71 ml     Exam:    BP (!) 185/123   Pulse 121   Temp 101.1 °F (38.4 °C)   Resp 30   Ht 6' 1\" (1.854 m) Wt 277 lb 5.4 oz (125.8 kg)   SpO2 91%   BMI 36.59 kg/m²     General appearance: intubated and prone  HEENT: Conjunctivae/corneas clear. Neck: Trachea midline. Respiratory:  Ventilated  Cardiovascular: Tachycardic  Abdomen: Soft, non-tender, non-distended with normal bowel sounds. Musculoskeletal: No clubbing, cyanosis or edema bilaterally. Neuro: sedated  Capillary Refill: Brisk,< 3 seconds   Peripheral Pulses: +2 palpable, equal bilaterally     Labs:   Recent Labs     10/09/21  0547 10/09/21  0931 10/10/21  0600 10/10/21  1016 10/11/21  0452 10/11/21  0549 10/11/21  0853   WBC 13.6*  --  13.6*  --   --  23.4*  --    HGB 16.3   < > 16.6   < > 18.8* 17.1 20.0*   HCT 50.4  --  50.8  --   --  52.7*  --      --  261  --   --  323  --     < > = values in this interval not displayed. Recent Labs     10/09/21  0548 10/09/21  0931 10/10/21  0600 10/10/21  1016 10/11/21  0452 10/11/21  0556 10/11/21  0853   *  --  136  --   --  131*  --    K 4.3  --  4.6  --   --  5.9*  --    CL 98  --  98  --   --  94*  --    CO2 26  --  27  --   --  27  --    BUN 21*  --  26*  --   --  33*  --    CREATININE 1.08   < > 0.99   < > 1.1 1.05 1.4*   CALCIUM 8.3*  --  8.3*  --   --  8.4*  --     < > = values in this interval not displayed. Recent Labs     10/09/21  0548 10/10/21  0600 10/11/21  0556   AST 37 34 39   ALT 27 28 33   BILITOT 0.6 0.5 0.4   ALKPHOS 74 88 111*     No results for input(s): INR in the last 72 hours. No results for input(s): Jennie Mars[place] in the last 72 hours. Urinalysis:    No results found for: Ward Sav, BACTERIA, RBCUA, BLOODU, [address] [place] [address]    Radiology:  XR CHEST PORTABLE   Final Result   There are changes previous study with patchy alveolar opacities throughout both lungs consistent with the given diagnosis of COVID-19 pneumonia. XR CHEST PORTABLE   Final Result   There are alveolar opacities which may represent early infiltrates, pneumonia.  The differential includes COVID-19 pneumonia versus other etiologies            XR CHEST PORTABLE   Final Result      INTERVAL PLACEMENT RIGHT CHEST TUBE IN EXPECTED POSITION. APPARENT RESOLUTION OF THE PREVIOUSLY DEMONSTRATED RIGHT PNEUMOTHORAX. OTHERWISE, STABLE CHEST FROM EARLIER 10/8/2021. XR CHEST PORTABLE   Final Result      XR CHEST PORTABLE   Final Result      Bilateral pneumonia appears progressed when compared to prior chest radiograph of September 29, 2021, possibly in part secondary to suboptimal inspiration. CTA Chest W WO  (PE study)   Final Result   1. LIMITED EXAMINATION DUE TO SUBOPTIMAL OPACIFICATION. WITHIN LIMITS EXAMINATION NO CENTRAL OR PROXIMAL PULMONARY EMBOLI.   2. PATCHY MULTIFOCAL TO COALESCENT AIRSPACE DISEASE DIFFUSELY THROUGHOUT THE LUNG PARENCHYMA. COMMONLY REPORT IMAGING FEATURES OF (COVID-19) PNEUMONIA ARE PRESENT. OTHER PROCESSES SUCH AS INFLUENZA, PNEUMONIA AND ORGANIZING PNEUMONIA AS CAN BE SEEN WITH    DRUG TOXICITY AND CONNECTIVE TISSUE DISEASE CAN CAUSE A SIMILAR IMAGING PATTERN. All CT scans at this facility use dose modulation, iterative reconstruction, and/or weight based dosing when appropriate to reduce radiation dose to as low as reasonably achievable.             US DUP LOWER EXTREMITIES BILATERAL VENOUS    (Results Pending)     Assessment/Plan:    #Acute hypoxic respiratory failure secondary to COVID 19 PNA    - continue meds and management per pulm and ID; currently intubated and proned; not tolerating supine; attempted to transfer to [facility] for Ecmo but deemed a poor candidate by [facility] Ecmo team    #Hyperglycemia    - SSI    #Right sided pneumothorax    - R sided chest tube placed    #HARLEEN     - CPAP at home    C/Em [address] Problems    Diagnosis Date Noted    Hypercoagulable state [facility]) [D68.59]     Acute respiratory failure with hypoxia ([facility] [address] [J96.01]     Pneumonia due to 2019 novel coronavirus [U07.1, J12.82] 10/01/2021    Intermittent asthma [J45.20] 01/09/2018     Additional work up or/and treatment plan may be added today or then after based on clinical progression. I am managing a portion of pt care. Some medical issues are handled by other specialists. Additional work up and treatment should be done in out pt setting by pt PCP and other out pt providers. In addition to examining and evaluating pt, I spent additional time explaining care, normal and abnormal findings, and treatment plan. All of pt questions were answered. Counseling, diet and education were  provided. Case will be discussed with nursing staff when appropriate. Family will be updated if and when appropriate. Diet: Diet NPO  ADULT TUBE FEEDING; Orogastric; Peptide Based; Continuous; 20; No; 50; Q 4 hours; Protein;  Add 1 proteinex with water flush TID    Code Status: Full Code    PT/OT Eval     Electronically signed by Christina Castro MD on 10/11/2021 at 2:16 PM Consent (Temporal Branch)/Introductory Paragraph: The rationale for Mohs was explained to the patient and consent was obtained. The risks, benefits and alternatives to therapy were discussed in detail. Specifically, the risks of damage to the temporal branch of the facial nerve, infection, scarring, bleeding, prolonged wound healing, incomplete removal, allergy to anesthesia, and recurrence were addressed. Prior to the procedure, the treatment site was clearly identified and confirmed by the patient. All components of Universal Protocol/PAUSE Rule completed.

## 2021-10-11 NOTE — PROGRESS NOTES
Ultrasound in for venous duplex but unable to do until pt supine and pt is unable at this time prone currently.

## 2021-10-11 NOTE — PROGRESS NOTES
Pt remains on ventilator, currently in prone position as ordered. Og tube checked for placement and o residuals noted. Restarted feedings as ordered.

## 2021-10-11 NOTE — PROGRESS NOTES
Pulmonary & Critical Care Medicine ICU Progress Note  Chief complaint : COVID-19 pneumonia/acute hypoxic respiratory failure    Subjunctive/24 hour events :   Patient seen and examined during multidisciplinary rounds with RN, charge nurse, RT, pharmacy, dietitian, and social service. Patient was intubated on Friday, he had central line placed in left IJ with complicated iatrogenic pneumothorax chest tube was placed, currently not an active issue, he was placed in prone position, continues to be in prone position with severe hypoxia currently 100% FiO2, and 16 of PEEP, saturation 91%. Patient on Nimbex drip, not on pressors.  No Bowel movement, urine output 1500 cc, +1.2 L    Social History     Tobacco Use    Smoking status: Never Smoker    Smokeless tobacco: Never Used   Substance Use Topics    Alcohol use: Yes         Problem Relation Age of Onset    Arthritis Mother         RA, Fibromyalgia    Heart Disease Mother         92% blockage in heart, stent    Heart Disease Father     High Blood Pressure Father     High Cholesterol Father     Diabetes Father     Cancer Father         throat    Diabetes Brother        Recent Labs     10/10/21  2040 10/11/21  0452   PHART 7.298* 7.240*   [id number] 67* 67*   PO2ART 62* 64*       MV Settings:  Vent Mode: AC/VC Rate Set: 30 bmp/Vt Ordered: 400 mL/ /FiO2 : 100 %           IV:   sodium chloride      fentaNYL (SUBLIMAZE) infusion 125 mcg/hr (10/11/21 0530)    propofol 45 mcg/kg/min (10/11/21 0524)    cisatracurium (NIMBEX) infusion 2.2 mcg/kg/min (10/10/21 0465)    dextrose      dextrose      sodium chloride         Vitals:  BP (!) 185/123   Pulse 107   Temp 99.9 °F (37.7 °C) (Bladder)   Resp 30   Ht 6' 1\" (1.854 m)   Wt 277 lb 5.4 oz (125.8 kg)   SpO2 91%   BMI 36.59 kg/m²    Tmax:        Intake/Output Summary (Last 24 hours) at 10/11/2021 0747  Last data filed at 10/11/2021 0530  Gross per 24 hour   Intake 2496.71 ml   Output 1680 ml   Net 816.71 ml EXAM:  General: On vent, sedated, paralyzed, in prone position  Head: normocephalic, atraumatic  Eyes:No gross abnormalities. ENT:  MMM no lesions, ET and OG tube in  Neck:  supple and no masses  Chest : Bilateral rales, good air movement, no wheezing, nontender, tympanic  Heart[de-identified] Heart sounds are normal.  Regular rate and rhythm without murmur, gallop or rub. ABD:  symmetric, soft, non-tender, no guarding or rebound  Musculoskeletal : no cyanosis, no clubbing and no edema  Neuro:   Sedated and paralyzed in prone position  Skin: No rashes or nodules noted.   Lymph node:  no cervical nodes  Urology: Yes Metcalf   Psychiatric: Sedated    Medications:  Scheduled Meds:   chlorhexidine  15 mL Mouth/Throat BID    insulin lispro  0-12 Units SubCUTAneous Q4H    docusate  100 mg Per NG tube BID    pantoprazole  40 mg IntraVENous Daily    And    sodium chloride (PF)  10 mL IntraVENous Daily    sodium chloride flush  5-40 mL IntraVENous 2 times per day    enoxaparin  30 mg SubCUTAneous BID    dexamethasone  6 mg Oral Daily    levothyroxine  75 mcg Oral Daily    clomiPHENE  50 mg Oral Daily    fluticasone  2 spray Each Nostril Daily    traZODone  50 mg Oral Nightly    budesonide-formoterol  2 puff Inhalation BID    tiotropium  2 puff Inhalation Daily       PRN Meds:  labetalol, glucose, dextrose, glucagon (rDNA), dextrose, hydrOXYzine, glucose, dextrose, glucagon (rDNA), dextrose, sodium chloride flush, sodium chloride, ondansetron **OR** ondansetron, polyethylene glycol, acetaminophen **OR** acetaminophen, albuterol sulfate HFA, HYDROcodone-homatropine    Results: reviewed by me   CBC:   Recent Labs     10/09/21  0547 10/09/21  0931 10/10/21  0600 10/10/21  1016 10/10/21  2040 10/11/21  0452 10/11/21  0549   WBC 13.6*  --  13.6*  --   --   --  23.4*   HGB 16.3   < > 16.6   < > 19.3* 18.8* 17.1   HCT 50.4  --  50.8  --   --   --  52.7*   MCV 82.1  --  82.0  --   --   --  83.3     --  261  --   --   -- 323    < > = values in this interval not displayed. BMP:   Recent Labs     10/09/21  0548 10/09/21  0931 10/10/21  0600 10/10/21  1016 10/10/21  2040 10/11/21  0452 10/11/21  0556   *  --  136  --   --   --  131*   K 4.3  --  4.6  --   --   --  5.9*   CL 98  --  98  --   --   --  94*   CO2 26  --  27  --   --   --  27   BUN 21*  --  26*  --   --   --  33*   CREATININE 1.08   < > 0.99   < > 1.0 1.1 1.05    < > = values in this interval not displayed. LIVER PROFILE:   Recent Labs     10/09/21  0548 10/10/21  0600 10/11/21  0556   AST 37 34 39   ALT 27 28 33   BILITOT 0.6 0.5 0.4   ALKPHOS 74 88 111*     PT/INR: No results for input(s): PROTIME, INR in the last 72 hours. APTT: No results for input(s): APTT in the last 72 hours. UA:No results for input(s): NITRITE, COLORU, PHUR, LABCAST, WBCUA, RBCUA, MUCUS, TRICHOMONAS, YEAST, BACTERIA, CLARITYU, SPECGRAV, LEUKOCYTESUR, UROBILINOGEN, BILIRUBINUR, BLOODU, GLUCOSEU, AMORPHOUS in the last 72 hours. Invalid input(s): Elease Schiller    Cultures:  Negative so far  Films:  CXR reviewed by me and it showed bilateral groundglass infiltrates, chest tube in good position, no pneumothorax      Assessment:   This is a critically ill patient at risk of deterioration / death , needing close ICU monitoring and intervention due to below noted problems   · Severe acute hypoxic respiratory failure  · Severe COVID-19 infection  · Obstructive sleep apnea on CPAP at home  · Obesity  · History of asthma  · Hyperkalemia      Recommendation  · Vent support lung protective strategy  · head of the bed 30°  · No sedation holiday for now  · Breathing trials when lung mechanics improve  · Sedation with combination propofol and fentanyl target R ASS of 0 to -1  · Watch for ICU delirium: TV on, natural light, avoid benzos, pain control, early mobility, and family engagement  · PUD prophylaxis  · DVT prophylaxis  · Completed 10 days of dexamethasone and 5 days of remdesivir  · Monitor blood sugar target 140180  · Lasix 20 mg IV x1   · Continue tube feed  · Start empiric vancomycin and meropenem  · Check phosphorus and CK  · Start insulin drip  · Monitor potassium  · Bilateral lower extremity ultrasound  · Consider transferring to tertiary care center for possible ECMO    Due to the immediate potential for life-threatening deterioration due to acute respiratory failure I spent  35  minutes providing critical care.  This time is excluding time spent performing procedures.           Electronically signed by Chrissy Nolasco MD,  [facility] ,on 10/11/2021 at 7:47 AM

## 2021-10-11 NOTE — PROGRESS NOTES
Pt proned @ 2330    Pt tachycardic into 130's. bp elevated with systolic in 204'P. NP stan perfect served.  New orders given (see eMAR)

## 2021-10-12 NOTE — PROGRESS NOTES
Pulmonary & Critical Care Medicine ICU Progress Note  Chief complaint : COVID-19 pneumonia/acute hypoxic respiratory failure    Subjunctive/24 hour events :   Patient seen and examined during multidisciplinary rounds with RN, charge nurse, RT, pharmacy, dietitian, and social service.       Patient on vent, sedated, in prone position, currently on 90% FiO2, 16 of PEEP, saturation 93%, he is on Nimbex drip, insulin drip, fentanyl and propofol, urine output 2500 cc, continue to have fever, temperature 100.8, on tube feed, no bowel movement    Social History     Tobacco Use    Smoking status: Never Smoker    Smokeless tobacco: Never Used   Substance Use Topics    Alcohol use: Yes         Problem Relation Age of Onset    Arthritis Mother         RA, Fibromyalgia    Heart Disease Mother         92% blockage in heart, stent    Heart Disease Father     High Blood Pressure Father     High Cholesterol Father     Diabetes Father     Cancer Father         throat    Diabetes Brother        Recent Labs     10/11/21  2128 10/12/21  0334   PHART 7.176* 7.225*   [id number] 93* 90*   PO2ART 71* 83*       MV Settings:  Vent Mode: AC/VC Rate Set: 30 bmp/Vt Ordered: 400 mL/ /FiO2 : (S) 90 %           IV:   insulin 13.56 Units/hr (10/12/21 0500)    sodium bicarbonate infusion 150 mL/hr at 10/12/21 0734    fentaNYL (SUBLIMAZE) infusion 125 mcg/hr (10/12/21 0558)    propofol 40 mcg/kg/min (10/12/21 0733)    cisatracurium (NIMBEX) infusion 3.5 mcg/kg/min (10/11/21 4546)    dextrose      sodium chloride         Vitals:  BP (!) 185/123   Pulse 100   Temp 100.8 °F (38.2 °C) (Bladder)   Resp 30   Ht 6' 1\" (1.854 m)   Wt 277 lb 5.4 oz (125.8 kg)   SpO2 95%   BMI 36.59 kg/m²    Tmax:        Intake/Output Summary (Last 24 hours) at 10/12/2021 0811  Last data filed at 10/12/2021 0530  Gross per 24 hour   Intake 3643.18 ml   Output 2620 ml   Net 1023.18 ml       EXAM:  General: On vent, sedated, paralyzed, in prone position  Head: normocephalic, atraumatic  Eyes:No gross abnormalities. ENT:  MMM no lesions, ET and OG tube in  Neck:  supple and no masses  Chest : Vent sounds, bilateral rales, no wheezing, nontender, tympanic  Heart[de-identified] Heart sounds are normal.  Regular rate and rhythm without murmur, gallop or rub. ABD:  symmetric, soft, non-tender, no guarding or rebound  Musculoskeletal : no cyanosis, no clubbing and no edema  Neuro:   Sedated and paralyzed in prone position  Skin: No rashes or nodules noted.   Lymph node:  no cervical nodes  Urology: Yes Metcalf   Psychiatric: Sedated    Medications:  Scheduled Meds:   vancomycin  1,000 mg IntraVENous Q12H    vancomycin (VANCOCIN) intermittent dosing (placeholder)   Other RX Placeholder    meropenem  1,000 mg IntraVENous Q8H    sodium zirconium cyclosilicate  10 g Oral TID    chlorhexidine  15 mL Mouth/Throat BID    [Held by provider] insulin lispro  0-12 Units SubCUTAneous Q4H    docusate  100 mg Per NG tube BID    pantoprazole  40 mg IntraVENous Daily    And    sodium chloride (PF)  10 mL IntraVENous Daily    sodium chloride flush  5-40 mL IntraVENous 2 times per day    enoxaparin  30 mg SubCUTAneous BID    levothyroxine  75 mcg Oral Daily    clomiPHENE  50 mg Oral Daily    [Held by provider] traZODone  50 mg Oral Nightly    [Held by provider] budesonide-formoterol  2 puff Inhalation BID    [Held by provider] tiotropium  2 puff Inhalation Daily       PRN Meds:  labetalol, glucose, glucagon (rDNA), hydrOXYzine, dextrose, dextrose, sodium chloride flush, sodium chloride, ondansetron **OR** ondansetron, polyethylene glycol, acetaminophen **OR** acetaminophen, albuterol sulfate HFA, HYDROcodone-homatropine    Results: reviewed by me   CBC:   Recent Labs     10/10/21  0600 10/10/21  1016 10/11/21  0549 10/11/21  0853 10/11/21  2128 10/12/21  0334 10/12/21  0506   WBC 13.6*  --  23.4*  --   --   --  21.1*   HGB 16.6   < > 17.1   < > 18.4* 18.9* 15.6   HCT 50.8  -- 52.7*  --   --   --  48.3   MCV 82.0  --  83.3  --   --   --  83.5     --  323  --   --   --  240    < > = values in this interval not displayed. BMP:   Recent Labs     10/11/21  1446 10/11/21  1543 10/11/21  2000 10/11/21  2128 10/12/21  0334 10/12/21  0506   *  --  131*  --   --  136   K 6.2*  --  5.8*  --   --  5.4*   CL 95  --  95  --   --  97   CO2 30  --  30  --   --  27   PHOS 3.5  --   --   --   --   --    BUN 35*  --  39*  --   --  41*   CREATININE 1.25*   < > 1.45* 2.2* 1.7* 1.18    < > = values in this interval not displayed. LIVER PROFILE:   Recent Labs     10/10/21  0600 10/11/21  0556 10/12/21  0506   AST 34 39 32   ALT 28 33 24   BILITOT 0.5 0.4 0.3   ALKPHOS 88 111* 110*     PT/INR: No results for input(s): PROTIME, INR in the last 72 hours. APTT: No results for input(s): APTT in the last 72 hours. UA:No results for input(s): NITRITE, COLORU, PHUR, LABCAST, WBCUA, RBCUA, MUCUS, TRICHOMONAS, YEAST, BACTERIA, CLARITYU, SPECGRAV, LEUKOCYTESUR, UROBILINOGEN, BILIRUBINUR, BLOODU, GLUCOSEU, AMORPHOUS in the last 72 hours. Invalid input(s): Willaim Furnish    Cultures:  Negative so far  Films:  CXR reviewed by me and it showed bilateral groundglass infiltrates, chest tube in good position, no pneumothorax      Assessment:   This is a critically ill patient at risk of deterioration / death , needing close ICU monitoring and intervention due to below noted problems   · Severe acute hypoxic respiratory failure  · Severe COVID-19 infection  · Obstructive sleep apnea on CPAP at home  · Obesity  · History of asthma  · Hyperkalemia      Recommendation  · Vent support lung protective strategy  · head of the bed 30°  · No sedation holiday for now  · Breathing trials when lung mechanics improve  · Sedation with combination propofol and fentanyl target R ASS of 0 to -1  · Watch for ICU delirium: TV on, natural light, avoid benzos, pain control, early mobility, and family engagement  · PUD prophylaxis  · DVT prophylaxis  · Currently in prone position, will continue same until lung mechanics improves for safe supine  · Completed 10 days of dexamethasone and 5 days of remdesivir  · Monitor blood sugar target 140180  · Continue insulin drip  · Repeat Lasix 20 mg IV x1  · Continue tube feed  · Continue empiric antibiotic  · Monitor potassium  · Bilateral lower extremity ultrasound, unable to do due to prone position will do when able to position in supine  · 2 phone calls were done yesterday with [facility] [place] patient was not accepted as a candidate for ECMO. Anyway with his current clinical status transportation is very risky and we do not do in-house cannulation    Due to the immediate potential for life-threatening deterioration due to acute respiratory failure I spent  35  minutes providing critical care.  This time is excluding time spent performing procedures.     Spoke with patient wife today and updated her      Electronically signed by Lynn Haney MD,  [facility] ,on 10/12/2021 at 8:11 AM

## 2021-10-12 NOTE — PROGRESS NOTES
Hospitalist Progress Note      PCP: Doreen Juarez MD    Date of Admission: 10/1/2021    Chief Complaint:    Chief Complaint   Patient presents with    Shortness of Breath     patient with shortness of breath covid positive 87 percent Ra     Subjective:  Patient is intubated and pone; unable to complete full 12 point ROS     Medications:  Reviewed    Infusion Medications    insulin 13.9 Units/hr (10/12/21 1200)    fentaNYL (SUBLIMAZE) infusion 125 mcg/hr (10/12/21 0515)    propofol 40 mcg/kg/min (10/12/21 0733)    cisatracurium (NIMBEX) infusion 3.5 mcg/kg/min (10/12/21 1214)    dextrose      sodium chloride       Scheduled Medications    polyethylene glycol  17 g Oral Daily    fluconazole  400 mg IntraVENous Q24H    levofloxacin  500 mg IntraVENous Q24H    sodium zirconium cyclosilicate  10 g Oral TID    chlorhexidine  15 mL Mouth/Throat BID    [Held by provider] insulin lispro  0-12 Units SubCUTAneous Q4H    docusate  100 mg Per NG tube BID    pantoprazole  40 mg IntraVENous Daily    And    sodium chloride (PF)  10 mL IntraVENous Daily    sodium chloride flush  5-40 mL IntraVENous 2 times per day    enoxaparin  30 mg SubCUTAneous BID    levothyroxine  75 mcg Oral Daily    [Held by provider] clomiPHENE  50 mg Oral Daily    [Held by provider] traZODone  50 mg Oral Nightly    [Held by provider] budesonide-formoterol  2 puff Inhalation BID    [Held by provider] tiotropium  2 puff Inhalation Daily     PRN Meds: diphenhydrAMINE, labetalol, glucose, glucagon (rDNA), hydrOXYzine, dextrose, dextrose, sodium chloride flush, sodium chloride, ondansetron **OR** ondansetron, polyethylene glycol, acetaminophen **OR** acetaminophen, albuterol sulfate HFA, HYDROcodone-homatropine      Intake/Output Summary (Last 24 hours) at 10/12/2021 1338  Last data filed at 10/12/2021 0530  Gross per 24 hour   Intake 3643.18 ml   Output 2620 ml   Net 1023.18 ml     Exam:    /78   Pulse 116   Temp 100.2 °F (37.9 °C) (Bladder)   Resp 30   Ht 6' 1\" (1.854 m)   Wt 277 lb 5.4 oz (125.8 kg)   SpO2 94%   BMI 36.59 kg/m²     General appearance: intubated and prone  HEENT: Conjunctivae/corneas clear. Neck: Trachea midline. Respiratory:  Ventilated  Cardiovascular: Tachycardic  Abdomen: Soft, non-tender, non-distended with normal bowel sounds. Musculoskeletal: No clubbing, cyanosis or edema bilaterally. Neuro: sedated  Capillary Refill: Brisk,< 3 seconds   Peripheral Pulses: +2 palpable, equal bilaterally     Labs:   Recent Labs     10/10/21  0600 10/10/21  1016 10/11/21  0549 10/11/21  0853 10/12/21  0334 10/12/21  0506 10/12/21  0859   WBC 13.6*  --  23.4*  --   --  21.1*  --    HGB 16.6   < > 17.1   < > 18.9* 15.6 16.7   HCT 50.8  --  52.7*  --   --  48.3  --      --  323  --   --  240  --     < > = values in this interval not displayed. Recent Labs     10/11/21  1446 10/11/21  1543 10/11/21  2000 10/11/21  2128 10/12/21  0334 10/12/21  0506 10/12/21  0859   *  --  131*  --   --  136  --    K 6.2*  --  5.8*  --   --  5.4*  --    CL 95  --  95  --   --  97  --    CO2 30  --  30  --   --  27  --    BUN 35*  --  39*  --   --  41*  --    CREATININE 1.25*   < > 1.45*   < > 1.7* 1.18 1.4*   CALCIUM 8.8  --  9.2  --   --  8.8  --    PHOS 3.5  --   --   --   --   --   --     < > = values in this interval not displayed. Recent Labs     10/10/21  0600 10/11/21  0556 10/12/21  0506   AST 34 39 32   ALT 28 33 24   BILITOT 0.5 0.4 0.3   ALKPHOS 88 111* 110*     No results for input(s): INR in the last 72 hours.   Recent Labs     10/11/21  1446   CKTOTAL 53       Urinalysis:    No results found for: Millan Posey, BACTERIA, RBCUA, BLOODU, SPECGRAV, GLUCOSEU    Radiology:  XR CHEST PORTABLE   Final Result   NO SIGNIFICANT INTERVAL CHANGE      XR CHEST PORTABLE   Final Result   There are changes previous study with patchy alveolar opacities throughout both lungs consistent with the given diagnosis of COVID-19 placed    #HARLEEN     - CPAP at home; currently intubated    Active Hospital Problems    Diagnosis Date Noted    Sepsis due to other etiology ([facility] [A41.89]     Hypercoagulable state ([facility] [D68.59]     Acute respiratory failure with hypoxia ([facility] [J96.01]     Pneumonia due to 2019 novel coronavirus [U07.1, J12.82] 10/01/2021    Intermittent asthma [J45.20] 01/09/2018     Additional work up or/and treatment plan may be added today or then after based on clinical progression. I am managing a portion of pt care. Some medical issues are handled by other specialists. Additional work up and treatment should be done in out pt setting by pt PCP and other out pt providers. In addition to examining and evaluating pt, I spent additional time explaining care, normal and abnormal findings, and treatment plan. All of pt questions were answered. Counseling, diet and education were  provided. Case will be discussed with nursing staff when appropriate. Family will be updated if and when appropriate. Diet: Diet NPO  ADULT TUBE FEEDING; Orogastric; Peptide Based; Continuous; 20; No; 50; Q 4 hours; Protein;  Add 1 proteinex with water flush TID    Code Status: Full Code    Electronically signed by Harshil Tadeo MD on 10/12/2021 at 1:38 PM

## 2021-10-12 NOTE — PROGRESS NOTES
Infectious Diseases Inpatient Progress Note          HISTORY OF PRESENT ILLNESS:  Follow up COVID-19 pneumonia with acute respiratory failure and hypoxia on IV remdesivir, status post tocilizumab, well tolerated. Patient required to be intubated and sedated, currently on assist control 90%   Patient's remains to be unstable, tachycardic. Good urine output.     Currently in a prone position   Remains to be febrile   had R chest tube placed for spontaneous pneumothorax   Was started on IV vancomycin and meropenem  Cultures are pending     current Medications:     vancomycin  1,000 mg IntraVENous Q12H    furosemide  20 mg IntraVENous Once    polyethylene glycol  17 g Oral Daily    fluconazole  400 mg IntraVENous Q24H    vancomycin (VANCOCIN) intermittent dosing (placeholder)   Other RX Placeholder    meropenem  1,000 mg IntraVENous Q8H    sodium zirconium cyclosilicate  10 g Oral TID    chlorhexidine  15 mL Mouth/Throat BID    [Held by provider] insulin lispro  0-12 Units SubCUTAneous Q4H    docusate  100 mg Per NG tube BID    pantoprazole  40 mg IntraVENous Daily    And    sodium chloride (PF)  10 mL IntraVENous Daily    sodium chloride flush  5-40 mL IntraVENous 2 times per day    enoxaparin  30 mg SubCUTAneous BID    levothyroxine  75 mcg Oral Daily    [Held by provider] clomiPHENE  50 mg Oral Daily    [Held by provider] traZODone  50 mg Oral Nightly    [Held by provider] budesonide-formoterol  2 puff Inhalation BID    [Held by provider] tiotropium  2 puff Inhalation Daily       Allergies:  Aleve [naproxen]      Review of Systems  unable to provide ROS because of sedation and intubation      Physical Exam  Vitals:    10/12/21 0400 10/12/21 0500 10/12/21 0600 10/12/21 0800   BP:    133/78   Pulse: 117 94 100 106   Resp: 30 30 30 30   Temp: 101.3 °F (38.5 °C)  100.8 °F (38.2 °C) 100.4 °F (38 °C)   TempSrc: Bladder  Bladder Bladder   SpO2: 95% 94% 95% 94%   Weight:       Height:         General Appearance: Sedated and intubated. Assist control 90%  Skin: warm and dry, no rash. Head: normocephalic and atraumatic  Eyes: anicteric sclerae  ENT:  normal mucous membranes. Intact ET and OG  Lungs: Assisted ventilation with bilateral basilar rales, intact R chest tube  Heart normal S1-S2 no murmur  Abdomen: soft, no distention or rigidity  No leg edema  No erythema, no tenderness  Clear urine in [facility]    DATA:    Lab Results   Component Value Date    WBC 21.1 (H) 10/12/2021    HGB 16.7 10/12/2021    HCT 48.3 10/12/2021    MCV 83.5 10/12/2021     10/12/2021     Lab Results   Component Value Date    CREATININE 1.4 (H) 10/12/2021    BUN 41 (H) 10/12/2021     10/12/2021    K 5.4 (H) 10/12/2021    CL 97 10/12/2021    CO2 27 10/12/2021       Hepatic Function Panel:  Lab Results   Component Value Date    ALKPHOS 110 10/12/2021    ALT 24 10/12/2021    AST 32 10/12/2021    PROT 5.5 10/12/2021    BILITOT 0.3 10/12/2021    LABALBU 2.7 10/12/2021        Impression       Bilateral pneumonia appears progressed when compared to prior chest radiograph of September 29, 2021, possibly in part secondary to suboptimal inspiration.         10/4/2021  6:29 PM - [facility], [facility] Incoming Lab Results From Soft    Component Value Ref Range & Units Status Collected Lab   Ferritin 2,022.0High   30.0 - 400.0 ng/mL Final       10/7/2021  6:08 AM - [facility], [facility] Incoming Lab Results From Soft    Component Value Ref Range & Units Status Collected Lab   Ferritin 1,500.0High   30.0 - 400.0 ng/mL Final 10/07/2021  5:08 AM MH - [facility]      10/8/2021 10:49 AM - [facility], [facility] Incoming Lab Results From Soft    Component Value Ref Range & Units Status Collected Lab   Ferritin 1,408. 0High   30.0 - 400.0 ng/mL        10/9/2021  6:45 AM - [facility], [facility] Incoming Lab Results From Soft    Component Value Ref Range & Units Status Collected Lab   D-Dimer, Quant 2. 75High Panic   0.00 - 0.50 mg/L FEU Final 10/09/2021  5:48 AM  - [facility] Lab VTE (DVT or PE) cut-off = 0.50 mg/L FEU   Testing Performed By    10/9/2021  2:01 PM - [facility], [facility] Incoming Lab Results From Soft    Component Value Ref Range & Units Status Collected Lab   Ferritin 1,437.0High   30.0 - 400.0 ng/mL Final 10/09/2021  5:48 AM  - [facility] Lab   Testing Performed By    Maria Sweet Name Director Address     10/8/2021  4:35 PM - [facility], [facility] Incoming Lab Results From Soft    Component Value Ref Range & Units Status Collected Lab   Procalcitonin 0.14  0.00 - 0.15 ng/mL Final 10/08/2021  3:19 PM [address] [facility]   Suspected Sepsis:      Repeat procalcitonin is pending      IMPRESSION:    · Critical COVID-19 pneumonia, worsening  · Acute respiratory failure with severe hypoxia, requiring intubation  · Hypercoagulable state  · Probable cytokine storm/sepsis  · History of asthma    Patient Active Problem List   Diagnosis    Seasonal allergies    Acquired hypothyroidism    Hypogonadism, male    Hyperlipidemia    Chronic bilateral low back pain without sciatica    Intermittent asthma    Irritable bowel syndrome with diarrhea    Pneumonia due to 2019 novel coronavirus    Acute respiratory failure with hypoxia (HCC)    Hypercoagulable state (HCC)       PLAN:  · Follow-up blood cultures, Sputum culture   · Continue IV vancomycin and meropenem  · Add Diflucan for persistent fevers  · status post Actemra 640 mg IV on 10/05  · Status post 10 days remdesivir, start date 10/01  · Decadron and anticoagulation as ordered  · Follow-up CBC complete metabolic profile  · Vent support and weaning as tolerated  · Continue ICU monitoring  · Agree with transfer to a tertiary care facility for ECMO      Mallika Sam MD

## 2021-10-12 NOTE — PROGRESS NOTES
Pharmacy Vancomycin Consult     Vancomycin Day: 2  Current Dosin mg IVPB q12h    Temp 24hr max:  100.8 F    Recent Labs     10/11/21  0549 10/11/21  0556 10/11/21  2000 10/11/21  2128 10/12/21  0334 10/12/21  0506   BUN  --    < > 39*  --   --  41*   CREATININE  --    < > 1.45*   < > 1.7* 1.18   WBC 23.4*  --   --   --   --  21.1*    < > = values in this interval not displayed. Intake/Output Summary (Last 24 hours) at 10/12/2021 0803  Last data filed at 10/12/2021 0530  Gross per 24 hour   Intake 3643.18 ml   Output 2620 ml   Net 1023.18 ml     Cultures  Recent Labs     10/11/21  1326 10/03/21  1415 10/03/21  1415 21  1741 21  1428   BC  --   --   --   --  No growth after 5 days of incubation. BLOODCULT2  --   --   --   --  No growth after 5 days of incubation. LABGRAM Few WBC's  Few epithelial cells  Many Gram positive cocci  Moderate Small Gram positive rods     < > Many WBC's  Few epithelial cells  Mixed Respiratory Sophie with  Moderate Gram positive cocci in clusters-resembling Staph  Few Yeast  Rare Gram negative rods    --   --    ORG  --   --  Yeast*  --   --    COVID19  --   --   --  Detected*  --     < > = values in this interval not displayed. Height: 6' 1\" (185.4 cm), Weight: 277 lb 5.4 oz (125.8 kg), Body mass index is 36.59 kg/m². Estimated Creatinine Clearance: 103 mL/min (based on SCr of 1.18 mg/dL). .    Trough:  Recent Labs     10/12/21  0506   VANCORANDOM 9.9*      Assessment/Plan:  Random vanco level of 9.9 mg/L, current doses and labs added to [facility] program. Prediction of above goal trough of ~ 21 mg/L. Will decrease vancomycin dose to 1000 mg IVPB q12h with next due dose. , Trough 17.2, Pauc 71%, Tox 13%. Timing of future trough levels may be adjusted based on culture results, renal function, and clinical response. Thank you,    PANKAJ Lunsford Ph.  10/12/2021  8:09 AM

## 2021-10-12 NOTE — CARE COORDINATION
INTERDISCIPLINARY ROUNDING    October 12, 2021 at 3:38 PM EDT    Anticipated Discharge Date:    UNKNOWN    Anticipated Discharge Disposition: NOT AN EKMO CANIDATE NO TRANSFER. LTAC WHEN MEDICALLY STABLE VS HOSPICE. Patient Mobility or PT/OT ordered: N/A    Readmission Risk              Risk of Unplanned Readmission:  16           Discussed patient goal for the day, patient clinical progression, and barriers to discharge. The following Goal(s) of the Day/Commitment(s) have been identified:  PATIENT STILL ON VENT. NOT A CANIDATE FOR EKMO. PATIENT MAY NEED HOSPICE VS LTAC.        Gracie Marques RN  October 12, 2021

## 2021-10-13 NOTE — PROGRESS NOTES
Pulmonary & Critical Care Medicine ICU Progress Note  Chief complaint : COVID-19 pneumonia/acute hypoxic respiratory failure    Subjunctive/24 hour events :   Patient seen and examined during multidisciplinary rounds with RN, charge nurse, RT, pharmacy, dietitian, and social service. He is in prone position, paralyzed and sedated, oxygenation worse, currently on 18 of PEEP, 100% FiO2, saturation 88%, temperature 37.8, no vomiting, no bowel movement, urine output 2 L. Social History     Tobacco Use    Smoking status: Never Smoker    Smokeless tobacco: Never Used   Substance Use Topics    Alcohol use: Yes         Problem Relation Age of Onset    Arthritis Mother         RA, Fibromyalgia    Heart Disease Mother         92% blockage in heart, stent    Heart Disease Father     High Blood Pressure Father     High Cholesterol Father     Diabetes Father     Cancer Father         throat    Diabetes Brother        Recent Labs     10/12/21  2016 10/13/21  0325   PHART 7.291* 7.384   [id number] 90* 69*   PO2ART 64* 60*       MV Settings:  Vent Mode: AC/VC Rate Set: 30 bmp/Vt Ordered: 400 mL/ /FiO2 : 100 %           IV:   insulin 6.03 Units/hr (10/13/21 0558)    fentaNYL (SUBLIMAZE) infusion 125 mcg/hr (10/13/21 0013)    propofol 40 mcg/kg/min (10/13/21 0649)    cisatracurium (NIMBEX) infusion 6.5 mcg/kg/min (10/13/21 0502)    dextrose      sodium chloride         Vitals:  /78   Pulse 109   Temp 100 °F (37.8 °C) (Bladder)   Resp 30   Ht 6' 1\" (1.854 m)   Wt 289 lb 0.4 oz (131.1 kg)   SpO2 92%   BMI 38.13 kg/m²    Tmax:        Intake/Output Summary (Last 24 hours) at 10/13/2021 0756  Last data filed at 10/13/2021 0530  Gross per 24 hour   Intake 3393.84 ml   Output 2125 ml   Net 1268.84 ml       EXAM:  General: On vent, sedated, paralyzed, in prone position  Head: normocephalic, atraumatic  Eyes:No gross abnormalities.   ENT:  MMM no lesions, ET and OG tube in  Neck:  supple and no masses  Chest : Vent sounds, no wheezing, no rales, nontender, tympanic  Heart[de-identified] Heart sounds are normal.  Regular rate and rhythm without murmur, gallop or rub. ABD:  symmetric, soft, non-tender, no guarding or rebound  Musculoskeletal : no cyanosis, no clubbing and no edema  Neuro:   Sedated and paralyzed in prone position  Skin: No rashes or nodules noted. Lymph node:  no cervical nodes  Urology: Yes [facility]   Psychiatric: Sedated    Medications:  Scheduled Meds:   polyethylene glycol  17 g Oral Daily    fluconazole  400 mg IntraVENous Q24H    levofloxacin  500 mg IntraVENous Q24H    chlorhexidine  15 mL Mouth/Throat BID    [Held by provider] insulin lispro  0-12 Units SubCUTAneous Q4H    docusate  100 mg Per NG tube BID    pantoprazole  40 mg IntraVENous Daily    And    sodium chloride (PF)  10 mL IntraVENous Daily    sodium chloride flush  5-40 mL IntraVENous 2 times per day    enoxaparin  30 mg SubCUTAneous BID    levothyroxine  75 mcg Oral Daily    [Held by provider] clomiPHENE  50 mg Oral Daily    [Held by provider] traZODone  50 mg Oral Nightly    [Held by provider] budesonide-formoterol  2 puff Inhalation BID    [Held by provider] tiotropium  2 puff Inhalation Daily       PRN Meds:  diphenhydrAMINE, labetalol, glucose, glucagon (rDNA), hydrOXYzine, dextrose, dextrose, sodium chloride flush, sodium chloride, ondansetron **OR** ondansetron, polyethylene glycol, acetaminophen **OR** acetaminophen, albuterol sulfate HFA, HYDROcodone-homatropine    Results: reviewed by me   CBC:   Recent Labs     10/11/21  0549 10/11/21  0853 10/12/21  0506 10/12/21  0859 10/12/21  2016 10/13/21  0325 10/13/21  0515   WBC 23.4*  --  21.1*  --   --   --  20.3*   HGB 17.1   < > 15.6   < > 18.5* 16.6 14.6   HCT 52.7*  --  48.3  --   --   --  45.6   MCV 83.3  --  83.5  --   --   --  83.1     --  240  --   --   --  201    < > = values in this interval not displayed.      BMP:   Recent Labs     10/11/21  1446 10/11/21  1546 10/11/21  2000 10/11/21  2128 10/12/21  0506 10/12/21  0859 10/12/21  2016 10/13/21  0325 10/13/21  0516   *   < > 131*  --  136  --   --   --  139   K 6.2*  --  5.8*  --  5.4*  --   --   --  5.7*   CL 95   < > 95  --  97  --   --   --  96   CO2 30   < > 30  --  27  --   --   --  38*   PHOS 3.5  --   --   --   --   --   --   --   --    BUN 35*   < > 39*  --  41*  --   --   --  40*   CREATININE 1.25*   < > 1.45*   < > 1.18   < > 1.5* 1.5* 0.97    < > = values in this interval not displayed. LIVER PROFILE:   Recent Labs     10/11/21  0556 10/12/21  0506 10/13/21  0516   AST 39 32 33   ALT 33 24 18   BILITOT 0.4 0.3 0.3   ALKPHOS 111* 110* 92     PT/INR: No results for input(s): PROTIME, INR in the last 72 hours. APTT: No results for input(s): APTT in the last 72 hours. UA:No results for input(s): NITRITE, COLORU, PHUR, LABCAST, WBCUA, RBCUA, MUCUS, TRICHOMONAS, YEAST, BACTERIA, CLARITYU, SPECGRAV, LEUKOCYTESUR, UROBILINOGEN, BILIRUBINUR, BLOODU, GLUCOSEU, AMORPHOUS in the last 72 hours. Invalid input(s): Greta Pagan    Cultures:  Negative so far  Films:  CXR reviewed by me and it showed bilateral groundglass infiltrate, residual small left apical pneumothorax. Assessment:   This is a critically ill patient at risk of deterioration / death , needing close ICU monitoring and intervention due to below noted problems   · Severe acute hypoxic respiratory failure  · Severe COVID-19 infection  · Obstructive sleep apnea on CPAP at home  · Obesity  · History of asthma  · Hyperkalemia      Recommendation  · Vent support lung protective strategy  · head of the bed 30°  · No sedation holiday for now  · Breathing trials when lung mechanics improve  · Sedation with combination propofol and fentanyl target R ASS of 0 to -1  · Watch for ICU delirium: TV on, natural light, avoid benzos, pain control, early mobility, and family engagement  · PUD prophylaxis  · DVT prophylaxis  · Currently in prone position, will continue same until lung mechanics improves for safe supine  · Completed 10 days of dexamethasone and 5 days of remdesivir  · Monitor blood sugar target 140180  · Continue insulin drip  ·  Start Lasix 40 mg IV 3 times daily, will target negative balance  · Continue tube feed  · Continue empiric antibiotic  · Monitor potassium  · In light of pneumothorax, and worsening hypoxia, discussed with thoracic surgery to place a right apical chest tube hopefully this will improve oxygenation to maximum. · Unable to do bilateral lower extremity ultrasound, unable to do CT rule out PE, with worsening hypoxia, I will start heparin drip after chest tube is placed       REBECA Mcfadden     Due to the immediate potential for life-threatening deterioration due to acute respiratory failure I spent 35 minutes providing critical care.  This time is excluding time spent performing procedures.     Spoke with patient wife today and updated her      Electronically signed by Jayne Abdi MD,  [facility] ,on 10/13/2021 at 7:56 AM

## 2021-10-13 NOTE — PROGRESS NOTES
Comprehensive Nutrition Assessment    Type and Reason for Visit:  Reassess    Nutrition Recommendations/Plan:   Continue Peptide based TF (Vital 1.2) @ 20 ml/hr x 23 hrs. Add 1 proteinex with water flush TID. 50 ml water flush every 4 hrs    Recommend adding daily MVI with minerals to aid in meeting micronutrient needs    Nutrition Assessment:  Pt tolerating TF at trophic rate. Pt unable to reach goal rate to meet estimated nutrition needs due to proning schedule and high rate of propofol, recommend starting  MVI with minerals to aid in meeting micronutrient needs. No BM since 10/3, bowel regimen in place. Malnutrition Assessment:  Malnutrition Status: At risk for malnutrition (Comment)    Context:  Acute Illness     Findings of the 6 clinical characteristics of malnutrition:  Energy Intake:  7 - 50% or less of estimated energy requirements for 5 or more days  Weight Loss:  Unable to assess     Body Fat Loss:  Unable to assess     Muscle Mass Loss:  Unable to assess    Fluid Accumulation:  No significant fluid accumulation     Strength:  Not Performed    Estimated Daily Nutrient Needs:  Energy (kcal):  6810-8604 (kg x 11-14); Weight Used for Energy Requirements:  Current (121.2 kg)     Protein (g):  168 gm (kg IBW x 2.0); Weight Used for Protein Requirements:  Ideal (84 kg)        Fluid (ml/day):  ~2300; Method Used for Fluid Requirements:  1 ml/kcal      Nutrition Related Findings:  PMH of asthma, hypothyroidism, obesity, , insomnia\" Oral intake has been < 50% since admission ( 10/1). Intubated (10/8). OGT in place, propofol @ 30 ml/hr (792 kcal). proning. last BM (10/3) on colace and glycolax, nimbex. Wounds:  None       Current Nutrition Therapies:    Diet NPO  ADULT TUBE FEEDING; Orogastric; Peptide Based; Continuous; 20; No; 50; Q 4 hours; Protein;  Add 1 proteinex with water flush TID  Current Tube Feeding (TF) Orders:  · Feeding Route: Orogastric  · Formula: Peptide Based (Vital 1.2)  · Schedule: Continuous @ 20 ml/hr  · Additives/Modulars: Protein (x 3 (312 kcal, 78 gm protein))  · Water Flushes: 50 ml every 4 hrs (300 ml)  · Current TF & Flush Orders Provides: @ Jevity 1.5 @ 20 ml/hr x 23 hrs = 720 kcal (+ propofol), 30 gm protein, ~ 660 ml free water  · Goal TF & Flush Orders Provides: 864 kcal, 112 gm protein, ~660 ml free water    Anthropometric Measures:  · Height: 6' 1\" (185.4 cm)  · Current Body Weight: 289 lb (131.1 kg) (10/13)   · Admission Body Weight: 290 lb (131.5 kg) (stated)    · Usual Body Weight: 292 lb (132.5 kg) (4/21)     · Ideal Body Weight: 184 lbs; % Ideal Body Weight  > 100%   · BMI: 38.1  · BMI Categories: Obese Class 2 (BMI 35.0 -39.9)       Nutrition Diagnosis:   · Inadequate oral intake related to impaired respiratory function, altered GI function as evidenced by intubation    Nutrition Interventions:   Food and/or Nutrient Delivery:  Vitamin Supplement, Mineral Supplement, Continue Current Tube Feeding (Continue Peptide based TF (Vital 1.2) @ 20 ml/hr x 23 hrs. Add 1 proteinex with water flush TID.  recommend adding daily MVI with minerals to aid in meeting micronutrient needs)  Nutrition Education/Counseling:  Education not indicated   Coordination of Nutrition Care:  No recommendation at this time    Goals:  New goal with initiation of TF: EN to meet estimated nutrition needs, +BM       Behavioral-Environmental Outcomes:  None Identified   Food/Nutrient Intake Outcomes:  Enteral Nutrition Intake/Tolerance  Physical Signs/Symptoms Outcomes:  Biochemical Data, GI Status, Fluid Status or Edema, Weight     Electronically signed by Diandra Escalante RD, LD on 10/13/21 at 4:16 PM EDT

## 2021-10-13 NOTE — PROCEDURES
Michelle Mcnally is a 46 y.o. male patient. 1. Hypoxia    2. Pneumonia due to 2019 novel coronavirus    3. Dyspnea, unspecified type      Past Medical History:   Diagnosis Date    Acquired hypothyroidism 3/2/2017    Chronic bilateral low back pain without sciatica 1/9/2018    Hyperlipidemia 3/2/2017    Hypogonadism, male 3/2/2017    Irritable bowel syndrome with diarrhea 1/9/2018    Mild intermittent asthma without complication 9/1/9516    Seasonal allergies 10/21/2014     Blood pressure 133/78, pulse 133, temperature 102 °F (38.9 °C), resp. rate 30, height 6' 1\" (1.854 m), weight 289 lb 0.4 oz (131.1 kg), SpO2 (!) 84 %. Chest Tube Insertion    Date/Time: 10/13/2021 11:50 AM  Performed by: Zena Juarez MD  Authorized by: Zena Juarez MD   Consent: The procedure was performed in an emergent situation. Verbal consent not obtained. Written consent not obtained. Patient identity confirmed: anonymous protocol, patient vented/unresponsive  Indications: pneumothorax    Sedation:  Patient sedated: yes  Sedatives: propofol  Analgesia: fentanyl  Vitals: Vital signs were monitored during sedation. Preparation: skin prepped with ChloraPrep  Placement location: right lateral  Scalpel size: 11  Tube size: 20 [place]  Dissection instrument: Lynn clamp  Ultrasound guidance: no  Tension pneumothorax heard: no  Tube connected to: suction  Suture material: 0 silk  Dressing: 4x4 sterile gauze  Patient tolerance: patient tolerated the procedure well with no immediate complications  Comments: Needle stick to surgeon occurred during the procedure.            Antonia Li MD  10/13/2021

## 2021-10-13 NOTE — CARE COORDINATION
INTERDISCIPLINARY ROUNDING    October 13, 2021 at 1:17 PM EDT    Anticipated Discharge Date:   Unknown at this time    Anticipated Discharge Disposition:    Patient Mobility or PT/OT ordered: N/A at this time    Readmission Risk              Risk of Unplanned Readmission:  17           Discussed patient goal for the day, patient clinical progression, and barriers to discharge. The following Goal(s) of the Day/Commitment(s) have been identified:  Referral - Hospice  - Team ICU quality rounds done this morning and pt has new orders for [facility] care and Hospice. I called patients wife and she is on her way in to see him . I explained how we want to support and answer any questions she has. I also let her know that Dr. Conrad Roberts ordered Hospice consult and that they could meet with her to discuss hospice and what they could offer if he did not improve. Patients wife states they just lost his mother on Aug 5th and she had hospice and also she is a nurse 24 years and knows what hospice is. She states she is not at all ready to talk about hospice and declines that at this time. I also let her know Pall care is ordered and that we are all here to offer support and that the doctors also want her to know what is going on and how sick he is. I let her know we are available to talk or meet with her as well at any time. Will continue to follow.        Ganga John  October 13, 2021

## 2021-10-13 NOTE — PROGRESS NOTES
ABG results  PH 7.229  PCO2 93.7 mmHg  PO2 59.5 mmHg  cHCO3-  39.1 mmol/L  BE(ecf) 11.5 mmol/L  cSO2 82.7%  Lac 1.97 mmol/L

## 2021-10-13 NOTE — PROGRESS NOTES
Hospitalist Progress Note      PCP: Mu Zabala MD    Date of Admission: 10/1/2021    Chief Complaint:    Chief Complaint   Patient presents with    Shortness of Breath     patient with shortness of breath covid positive 87 percent Ra     Subjective:  Patient is intubated and pone; unable to complete full 12 point ROS     Medications:  Reviewed    Infusion Medications    heparin (PORCINE) Infusion 16 Units/kg/hr (10/13/21 0825)    insulin 8.28 Units/hr (10/13/21 0902)    fentaNYL (SUBLIMAZE) infusion 125 mcg/hr (10/13/21 0830)    propofol 40 mcg/kg/min (10/13/21 0958)    cisatracurium (NIMBEX) infusion 6 mcg/kg/min (10/13/21 0957)    dextrose      sodium chloride       Scheduled Medications    furosemide  40 mg IntraVENous TID    polyethylene glycol  17 g Oral Daily    fluconazole  400 mg IntraVENous Q24H    levofloxacin  500 mg IntraVENous Q24H    chlorhexidine  15 mL Mouth/Throat BID    [Held by provider] insulin lispro  0-12 Units SubCUTAneous Q4H    docusate  100 mg Per NG tube BID    pantoprazole  40 mg IntraVENous Daily    And    sodium chloride (PF)  10 mL IntraVENous Daily    sodium chloride flush  5-40 mL IntraVENous 2 times per day    levothyroxine  75 mcg Oral Daily    [Held by provider] clomiPHENE  50 mg Oral Daily    [Held by provider] traZODone  50 mg Oral Nightly    [Held by provider] budesonide-formoterol  2 puff Inhalation BID    [Held by provider] tiotropium  2 puff Inhalation Daily     PRN Meds: heparin (porcine), heparin (porcine), diphenhydrAMINE, labetalol, glucose, glucagon (rDNA), hydrOXYzine, dextrose, dextrose, sodium chloride flush, sodium chloride, ondansetron **OR** ondansetron, polyethylene glycol, acetaminophen **OR** acetaminophen, albuterol sulfate HFA, HYDROcodone-homatropine      Intake/Output Summary (Last 24 hours) at 10/13/2021 1045  Last data filed at 10/13/2021 0530  Gross per 24 hour   Intake 3393.84 ml   Output 2125 ml   Net 1268.84 ml Exam:    /78   Pulse 125   Temp 100.8 °F (38.2 °C) (Bladder)   Resp 30   Ht 6' 1\" (1.854 m)   Wt 289 lb 0.4 oz (131.1 kg)   SpO2 (!) 85%   BMI 38.13 kg/m²     General appearance: intubated and prone  HEENT: Conjunctivae/corneas clear. Neck: Trachea midline. Respiratory:  Ventilated  Cardiovascular: Tachycardic  Abdomen: Deferred  Musculoskeletal: No clubbing, cyanosis or edema bilaterally. Neuro: sedated  Capillary Refill: Brisk,< 3 seconds   Peripheral Pulses: +2 palpable, equal bilaterally     Labs:   Recent Labs     10/11/21  0549 10/11/21  0853 10/12/21  0506 10/12/21  0859 10/13/21  0325 10/13/21  0515 10/13/21  0952   WBC 23.4*  --  21.1*  --   --  20.3*  --    HGB 17.1   < > 15.6   < > 16.6 14.6 17.2   HCT 52.7*  --  48.3  --   --  45.6  --      --  240  --   --  201  --     < > = values in this interval not displayed. Recent Labs     10/11/21  1446 10/11/21  1543 10/11/21  2000 10/11/21  2128 10/12/21  0506 10/12/21  0859 10/13/21  0325 10/13/21  0516 10/13/21  0952   *   < > 131*  --  136  --   --  139  --    K 6.2*  --  5.8*  --  5.4*  --   --  5.7*  --    CL 95   < > 95  --  97  --   --  96  --    CO2 30   < > 30  --  27  --   --  38*  --    BUN 35*   < > 39*  --  41*  --   --  40*  --    CREATININE 1.25*   < > 1.45*   < > 1.18   < > 1.5* 0.97 1.5*   CALCIUM 8.8   < > 9.2  --  8.8  --   --  8.7  --    PHOS 3.5  --   --   --   --   --   --   --   --     < > = values in this interval not displayed. Recent Labs     10/11/21  0556 10/12/21  0506 10/13/21  0516   AST 39 32 33   ALT 33 24 18   BILITOT 0.4 0.3 0.3   ALKPHOS 111* 110* 92     Recent Labs     10/13/21  0927   INR 1.2     Recent Labs     10/11/21  1446   CKTOTAL 53       Urinalysis:    No results found for: [place], BACTERIA, RBCUA, BLOODU, SPECGRAV, GLUCOSEU    Radiology:  XR CHEST PORTABLE   Final Result   There are moderate to severe bilateral alveolar opacities, pneumonia.       There is a left chest tube in place and a small left pneumothorax. XR CHEST PORTABLE   Final Result   NO SIGNIFICANT INTERVAL CHANGE      XR CHEST PORTABLE   Final Result   There are changes previous study with patchy alveolar opacities throughout both lungs consistent with the given diagnosis of COVID-19 pneumonia. XR CHEST PORTABLE   Final Result   There are alveolar opacities which may represent early infiltrates, pneumonia. The differential includes COVID-19 pneumonia versus other etiologies            XR CHEST PORTABLE   Final Result      INTERVAL PLACEMENT RIGHT CHEST TUBE IN EXPECTED POSITION. APPARENT RESOLUTION OF THE PREVIOUSLY DEMONSTRATED RIGHT PNEUMOTHORAX. OTHERWISE, STABLE CHEST FROM EARLIER 10/8/2021. XR CHEST PORTABLE   Final Result      XR CHEST PORTABLE   Final Result      Bilateral pneumonia appears progressed when compared to prior chest radiograph of September 29, 2021, possibly in part secondary to suboptimal inspiration. CTA Chest W WO  (PE study)   Final Result   1. LIMITED EXAMINATION DUE TO SUBOPTIMAL OPACIFICATION. WITHIN LIMITS EXAMINATION NO CENTRAL OR PROXIMAL PULMONARY EMBOLI.   2. PATCHY MULTIFOCAL TO COALESCENT AIRSPACE DISEASE DIFFUSELY THROUGHOUT THE LUNG PARENCHYMA. COMMONLY REPORT IMAGING FEATURES OF (COVID-19) PNEUMONIA ARE PRESENT. OTHER PROCESSES SUCH AS INFLUENZA, PNEUMONIA AND ORGANIZING PNEUMONIA AS CAN BE SEEN WITH    DRUG TOXICITY AND CONNECTIVE TISSUE DISEASE CAN CAUSE A SIMILAR IMAGING PATTERN. All CT scans at this facility use dose modulation, iterative reconstruction, and/or weight based dosing when appropriate to reduce radiation dose to as low as reasonably achievable.             US DUP LOWER EXTREMITIES BILATERAL VENOUS    (Results Pending)     Assessment/Plan:    #Acute hypoxic respiratory failure secondary to COVID 19 PNA; possible superimposed MSSA pneumonia    - continue meds and management per pulm and ID; currently intubated and proned; not tolerating supine    - continue broad spectrum antibiotics per ID and pulm recommendations    - started on lasix 40mg IV TID    - plan to start empirically treating with heparin after chest tube placement as unable to rule out PE at thiis time    #Hyperglycemia/Hyperkalemia    - On an insulin infusion    #Right sided pneumothorax    - R sided chest tube placed     - Plan for thoracic surgery to place right apical chest tube    Active Hospital Problems    Diagnosis Date Noted    Sepsis due to other etiology ([facility] [address] [A41.89]     Hypercoagulable state ([facility] [D68.59]     Acute respiratory failure with hypoxia ([facility] [address] [J96.01]     Pneumonia due to 2019 novel coronavirus [U07.1, J12.82] 10/01/2021    Intermittent asthma [J45.20] 01/09/2018     Additional work up or/and treatment plan may be added today or then after based on clinical progression. I am managing a portion of pt care. Some medical issues are handled by other specialists. Additional work up and treatment should be done in out pt setting by pt PCP and other out pt providers. In addition to examining and evaluating pt, I spent additional time explaining care, normal and abnormal findings, and treatment plan. All of pt questions were answered. Counseling, diet and education were  provided. Case will be discussed with nursing staff when appropriate. Family will be updated if and when appropriate. Diet: Diet NPO  ADULT TUBE FEEDING; Orogastric; Peptide Based; Continuous; 20; No; 50; Q 4 hours; Protein;  Add 1 proteinex with water flush TID    Code Status: Full Code    Electronically signed by Trish Walton MD on 10/13/2021 at 10:45 AM

## 2021-10-13 NOTE — PROGRESS NOTES
Infectious Diseases Inpatient Progress Note          HISTORY OF PRESENT ILLNESS:  Follow up COVID-19 pneumonia with acute respiratory failure and hypoxia on IV remdesivir, status post tocilizumab, well tolerated. Patient required to be intubated and sedated, currently on assist control 100%   Patient's remains to be unstable, tachycardic. Good urine output. Currently in a prone position   Remains to be febrile   had R chest tube placed for spontaneous pneumothorax   Was switched from vancomycin to meropenem to Levaquin by primary team yesterday since patient had a rash. Currently rash is resolved.    Patient remains to be febrile tachycardic and hypoxic  Cultures are negative except for sputum culture for methicillin sensitive staph aureus  On heparin drip for highly elevated D-dimer  current Medications:     furosemide  40 mg IntraVENous TID    meropenem  1,000 mg IntraVENous Q8H    polyethylene glycol  17 g Oral Daily    fluconazole  400 mg IntraVENous Q24H    chlorhexidine  15 mL Mouth/Throat BID    [Held by provider] insulin lispro  0-12 Units SubCUTAneous Q4H    docusate  100 mg Per NG tube BID    pantoprazole  40 mg IntraVENous Daily    And    sodium chloride (PF)  10 mL IntraVENous Daily    sodium chloride flush  5-40 mL IntraVENous 2 times per day    levothyroxine  75 mcg Oral Daily    [Held by provider] clomiPHENE  50 mg Oral Daily    [Held by provider] traZODone  50 mg Oral Nightly    [Held by provider] budesonide-formoterol  2 puff Inhalation BID    [Held by provider] tiotropium  2 puff Inhalation Daily       Allergies:  Aleve [naproxen]      Review of Systems  unable to provide ROS because of sedation and intubation      Physical Exam  Vitals:    10/13/21 1100 10/13/21 1128 10/13/21 1200 10/13/21 1300   BP:       Pulse: 133  140 136   Resp:       Temp:  102 °F (38.9 °C) 102.6 °F (39.2 °C)    TempSrc:   Bladder    SpO2: (!) 84%  (!) 89% (!) 88%   Weight:       Height:         General Appearance: Sedated and intubated. Assist control 90%  Skin: warm and dry, fading rash over the upper back. Head: normocephalic and atraumatic  Eyes: anicteric sclerae  ENT:  normal mucous membranes. Intact ET and OG  Lungs: Diminished breath sounds bilateral lung fields with bilateral basilar rales, intact R chest tube  Heart normal S1-S2 no murmur  Abdomen: soft, no distention   + leg edema  No erythema  Clear urine in [place]    DATA:    Lab Results   Component Value Date    WBC 20.3 (H) 10/13/2021    HGB 17.2 10/13/2021    HCT 45.6 10/13/2021    MCV 83.1 10/13/2021     10/13/2021     Lab Results   Component Value Date    CREATININE 1.5 (H) 10/13/2021    BUN 40 (H) 10/13/2021     10/13/2021    K 5.7 (H) 10/13/2021    CL 96 10/13/2021    CO2 38 (H) 10/13/2021       Hepatic Function Panel:  Lab Results   Component Value Date    ALKPHOS 92 10/13/2021    ALT 18 10/13/2021    AST 33 10/13/2021    PROT 5.1 10/13/2021    BILITOT 0.3 10/13/2021    LABALBU 2.7 10/13/2021   Specimen Source: Urine, clean catch Urine Culture, RoutineNo growth 24 hours Narrative:  ORDER#: [id number]                          ORDERED BY: ALYSA TELLO   SOURCE: Urine Clean Catch                  COLLECTED:  10/11/21 13:32   ANTIBIOTICS AT JOSE.:                      RECEIVED :  10/11/21 13:32 Culture, Blood 2 [[id number]] Collected: 10/11/21 1458 Updated: 10/13/21 0615 Specimen Type: Hand, Left Specimen Source: Blood Culture, Blood 2No Growth to date. Any change in status will be called.  Narrative:  ORDER#: [id number]                          ORDERED BY: ALYSA TELLO   SOURCE: Blood Left Hand                    COLLECTED:  10/11/21 14:58   ANTIBIOTICS AT JOSE.:                      RECEIVED :  10/11/21 14:58   Ferritin [[id number]] (Abnormal) Collected: 10/13/21 0516 Updated: 10/13/21 0603 Specimen Source: Blood Ferritin2,000.0High   10/13/2021  6:03 AM - Milka Knowles Incoming Lab Results From Soft    Component Value Ref Range & Units Status Collected Lab   Ferritin 2,000.0High             Sputum culture methicillin sensitive staph aureus  IMPRESSION:    · Critical COVID-19 pneumonia, worsening  · Acute respiratory failure with severe hypoxia, requiring intubation  · Superimposed bacterial pneumonia with methicillin sensitive staph aureus  · Drug rash possibly related to vancomycin  · Hypercoagulable state  · Probable cytokine storm/sepsis  · History of asthma    Patient Active Problem List   Diagnosis    Seasonal allergies    Acquired hypothyroidism    Hypogonadism, male    Hyperlipidemia    Chronic bilateral low back pain without sciatica    Intermittent asthma    Irritable bowel syndrome with diarrhea    Pneumonia due to 2019 novel coronavirus    Acute respiratory failure with hypoxia ([facility])    [facility]co[facility]able state ([facility]    [facility] to [facility]       PLAN:  · Follow-up blood cultures, Sputum culture   · DC Levaquin  · Resume meropenem  · Continue Diflucan  · status post Actemra 640 mg IV on 10/05  · Status post 10 days remdesivir, start date 10/01  · Decadron and anticoagulation as ordered  · Follow-up CBC complete metabolic profile  · Vent support and weaning as tolerated  · Continue ICU monitoring      Karson Hernandez MD

## 2021-10-13 NOTE — PLAN OF CARE
Nutrition Problem #1: Inadequate oral intake  Intervention: Food and/or Nutrient Delivery: Vitamin Supplement, Mineral Supplement, Continue Current Tube Feeding (Continue Peptide based TF (Vital 1.2) @ 20 ml/hr x 23 hrs. Add 1 proteinex with water flush TID.  recommend adding daily MVI with minerals to aid in meeting micronutrient needs)  Nutritional Goals: New goal with initiation of TF: EN to meet estimated nutrition needs, +BM
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
## 2021-10-13 NOTE — PROGRESS NOTES
Patient's temp 37.9 C at start of shift. Max temp was 39.7. Ice bags applied to groin and armpits with no change in temp. Cooling blanket applied. Patients insulin titrated per protocol. Tolerating vent and tube feeds. Spouse in to visit patient and updated on condition. Chest tube placed by Lesa and placing verified by Xray.

## 2021-10-14 NOTE — PROGRESS NOTES
Pulmonary & Critical Care Medicine ICU Progress Note  Chief complaint : COVID-19 pneumonia/acute hypoxic respiratory failure    Subjunctive/24 hour events :   Patient seen and examined during multidisciplinary rounds with RN, charge nurse, RT, pharmacy, dietitian, and social service. Had recurrence of his pneumothorax on the right yesterday, thoracic surgery consulted, currently has 2 chest tubes he is bleeding from the chest tube he is currently on heparin drip, patient is now 100% vent support with 18 of PEEP, saturation 92%, temperature 96.8 this morning, however he did spike a fever of 103.5 at 4 PM yesterday. No Bowel movement, urine output 2400 cc, +4.1 L.   Blood pressure today is on the low side,  Social History     Tobacco Use    Smoking status: Never Smoker    Smokeless tobacco: Never Used   Substance Use Topics    Alcohol use: Yes         Problem Relation Age of Onset    Arthritis Mother         RA, Fibromyalgia    Heart Disease Mother         92% blockage in heart, stent    Heart Disease Father     High Blood Pressure Father     High Cholesterol Father     Diabetes Father     Cancer Father         throat    Diabetes Brother        Recent Labs     10/13/21  2230 10/14/21  0444   PHART 7.240* 7.399   [id number] 96* 54*   PO2ART 70* 65*       MV Settings:  Vent Mode: AC/VC Rate Set: 30 bmp/Vt Ordered: (S) 380 mL/ /FiO2 : 100 %           IV:   heparin (PORCINE) Infusion 13 Units/kg/hr (10/14/21 0450)    insulin 10.17 Units/hr (10/14/21 0430)    fentaNYL (SUBLIMAZE) infusion 125 mcg/hr (10/14/21 0006)    propofol 40 mcg/kg/min (10/14/21 0549)    cisatracurium (NIMBEX) infusion 6.5 mcg/kg/min (10/14/21 0706)    dextrose      sodium chloride         Vitals:  /78   Pulse 112   Temp 96.8 °F (36 °C) (Bladder)   Resp 30   Ht 6' 1\" (1.854 m)   Wt 289 lb 0.4 oz (131.1 kg)   SpO2 92%   BMI 38.13 kg/m²    Tmax:        Intake/Output Summary (Last 24 hours) at 10/14/2021 9457  Last data filed at 10/14/2021 0530  Gross per 24 hour   Intake 3262 ml   Output 2565 ml   Net 697 ml       EXAM:  General: On vent, sedated, paralyzed, in prone position  Head: normocephalic, atraumatic  Eyes:No gross abnormalities. ENT:  MMM no lesions, ET and OG tube in  Neck:  supple and no masses  Chest : Vent sounds, no wheezing, no rales, nontender, tympanic, 2 chest tubes on the right blood oozing around chest tube insertion site  Heart[de-identified] Heart sounds are normal.  Regular rate and rhythm without murmur, gallop or rub. ABD:  symmetric, soft, non-tender, no guarding or rebound  Musculoskeletal : no cyanosis, no clubbing and no edema  Neuro:   Sedated and paralyzed in prone position  Skin: No rashes or nodules noted.   Lymph node:  no cervical nodes  Urology: Yes Metcalf   Psychiatric: Sedated    Medications:  Scheduled Meds:   furosemide  40 mg IntraVENous TID    meropenem  1,000 mg IntraVENous Q8H    polyethylene glycol  17 g Oral Daily    fluconazole  400 mg IntraVENous Q24H    chlorhexidine  15 mL Mouth/Throat BID    [Held by provider] insulin lispro  0-12 Units SubCUTAneous Q4H    docusate  100 mg Per NG tube BID    pantoprazole  40 mg IntraVENous Daily    And    sodium chloride (PF)  10 mL IntraVENous Daily    sodium chloride flush  5-40 mL IntraVENous 2 times per day    levothyroxine  75 mcg Oral Daily    [Held by provider] clomiPHENE  50 mg Oral Daily    [Held by provider] traZODone  50 mg Oral Nightly    [Held by provider] budesonide-formoterol  2 puff Inhalation BID    [Held by provider] tiotropium  2 puff Inhalation Daily       PRN Meds:  heparin (porcine), heparin (porcine), diphenhydrAMINE, labetalol, glucose, glucagon (rDNA), hydrOXYzine, dextrose, dextrose, sodium chloride flush, sodium chloride, ondansetron **OR** ondansetron, polyethylene glycol, acetaminophen **OR** acetaminophen, albuterol sulfate HFA, HYDROcodone-homatropine    Results: reviewed by me   CBC:   Recent Labs 10/13/21  0515 10/13/21  0952 10/13/21  1814 10/13/21  1814 10/13/21  2230 10/14/21  0444 10/14/21  0507   WBC 20.3*  --  26.0*  --   --   --  23.4*   HGB 14.6   < > 14.5   < > 15.2 11.4* 11.8*   HCT 45.6  --  45.7  --   --   --  36.5*   MCV 83.1  --  85.9  --   --   --  82.8     --  190  --   --   --  175    < > = values in this interval not displayed. BMP:   Recent Labs     10/11/21  1446 10/11/21  1543 10/12/21  0506 10/12/21  0859 10/13/21  0516 10/13/21  0952 10/13/21  2230 10/14/21  0444 10/14/21  0506   *   < > 136  --  139  --   --   --  138   K 6.2*   < > 5.4*  --  5.7*  --   --   --  5.6*   CL 95   < > 97  --  96  --   --   --  97   CO2 30   < > 27  --  38*  --   --   --  37*   PHOS 3.5  --   --   --   --   --   --   --   --    BUN 35*   < > 41*  --  40*  --   --   --  55*   CREATININE 1.25*   < > 1.18   < > 0.97   < > 2.4* 1.5* 1.09    < > = values in this interval not displayed. LIVER PROFILE:   Recent Labs     10/12/21  0506 10/13/21  0516 10/14/21  0506   AST 32 33 40   ALT 24 18 18   BILITOT 0.3 0.3 0.4   ALKPHOS 110* 92 102     PT/INR:   Recent Labs     10/13/21  0927   PROTIME 15.3*   INR 1.2     APTT:   Recent Labs     10/13/21  0927   APTT >150.0*     UA:No results for input(s): NITRITE, COLORU, PHUR, LABCAST, WBCUA, RBCUA, MUCUS, TRICHOMONAS, YEAST, BACTERIA, CLARITYU, SPECGRAV, LEUKOCYTESUR, UROBILINOGEN, BILIRUBINUR, BLOODU, GLUCOSEU, AMORPHOUS in the last 72 hours. Invalid input(s): Zhane Prudent    Cultures:  MSSA in sputum  Films:  CXR reviewed by me and it showed bilateral groundglass opacities, no pneumothorax. Assessment:   This is a critically ill patient at risk of deterioration / death , needing close ICU monitoring and intervention due to below noted problems   · Severe acute hypoxic respiratory failure  · Severe COVID-19 infection  · Septic shock  · Obstructive sleep apnea on CPAP at home  · Obesity  · History of asthma  · Hyperkalemia      Recommendation  · Vent support lung protective strategy  · head of the bed 30°  · No sedation holiday for now  · Breathing trials when lung mechanics improve  · Sedation with combination propofol and fentanyl target R ASS of 0 to -1  · Watch for ICU delirium: TV on, natural light, avoid benzos, pain control, early mobility, and family engagement  · PUD prophylaxis  · DVT prophylaxis, hold heparin drip in light of bleed from the chest tube site, will resume when this issue is stabilized  · Currently in prone position, will continue same until lung mechanics improves for safe supine  · Completed 10 days of dexamethasone and 5 days of remdesivir  · Monitor blood sugar target 140180  · Continue insulin drip  · Hold Lasix for now in light of lower blood pressure   · Levophed to maintain mean arterial pressure 65-70  · Add vasopressin  · Start Reglan 10 mg 3 times daily  · Continue Colace and MiraLAX  · Dulcolax suppository  · Continue tube feed  · Continue empiric antibiotic  · Monitor potassium  · repeat chest x-ray today  · Chest x-ray in a.m. · Resume heparin drip when bleed from chest tubes subsides  ·  will discuss with dietitian to adjust to low potassium formula  · Start Lokelma      Discussed with patient wife, updated her regarding his current situation, I did discuss with her that I do not believe he will make from the severe infection with multiorgan failure. We will continue same aggressive support     Due to the immediate potential for life-threatening deterioration due to acute respiratory failure I spent 40 minutes providing critical care.  This time is excluding time spent performing procedures.     Spoke with patient wife today and updated her      Electronically signed by Anastasia Farrar MD,  [facility] ,on 10/14/2021 at 7:49 AM

## 2021-10-14 NOTE — PROGRESS NOTES
Patient continued to bleed from chest tube site. Bedding changed and area redressed. Patient remains hypotensive and SpO2 remains in 80's. Spouse came in to visit and called for updates multiple times a day and given updates. Patients son also came in to visit. End of shift patient became extremely hypotensive, started epi drip. Dr. Osmany Israel notified and at bedside. Patients spouse given update on patients condition. Hand off given to night shift.

## 2021-10-14 NOTE — PROGRESS NOTES
Infectious Diseases Inpatient Progress Note          HISTORY OF PRESENT ILLNESS:  Follow up COVID-19 pneumonia with acute respiratory failure and hypoxia on IV remdesivir, status post tocilizumab, well tolerated. Patient required to be intubated and sedated, currently on assist control 100%   Patient's remains to be unstable, tachycardic. Good urine output.     Was hypotensive and required to be placed on 15 mics Levofed  Currently in a prone position   had high grade temperature  had R chest tube placed for spontaneous pneumothorax  On IV meropenem, well-tolerated  Cultures are negative except for sputum culture for methicillin sensitive staph aureus  On heparin drip for highly elevated D-dimer  current Medications:     metoclopramide  10 mg IntraVENous Q8H    lactulose  20 g Oral TID    bisacodyl  10 mg Rectal Daily    sodium zirconium cyclosilicate  10 g Oral BID    [Held by provider] furosemide  40 mg IntraVENous TID    meropenem  1,000 mg IntraVENous Q8H    polyethylene glycol  17 g Oral Daily    fluconazole  400 mg IntraVENous Q24H    chlorhexidine  15 mL Mouth/Throat BID    [Held by provider] insulin lispro  0-12 Units SubCUTAneous Q4H    docusate  100 mg Per NG tube BID    pantoprazole  40 mg IntraVENous Daily    And    sodium chloride (PF)  10 mL IntraVENous Daily    sodium chloride flush  5-40 mL IntraVENous 2 times per day    levothyroxine  75 mcg Oral Daily    [Held by provider] clomiPHENE  50 mg Oral Daily    [Held by provider] traZODone  50 mg Oral Nightly    [Held by provider] budesonide-formoterol  2 puff Inhalation BID    [Held by provider] tiotropium  2 puff Inhalation Daily       Allergies:  Aleve [naproxen]      Review of Systems  unable to provide ROS because of sedation and intubation      Physical Exam  Vitals:    10/14/21 0500 10/14/21 0600 10/14/21 0700 10/14/21 0719   BP:       Pulse: 98 101 113 112   Resp:       Temp:       TempSrc:       SpO2: 97% 95% 90% 92%   Weight: Height:         General Appearance: Sedated and intubated. Assist control 100%  Skin: warm and dry, fading rash over the upper back. Head: normocephalic and atraumatic  Eyes: anicteric sclerae  ENT:  normal mucous membranes. Intact ET and OG  Lungs: Diminished breath sounds bilateral lung fields with bilateral basilar rales, intact R chest tube  Heart normal S1-S2 no murmur  Abdomen: soft, no distention   +1 leg edema  No erythema  Clear urine in [place]    DATA:    Lab Results   Component Value Date    WBC 37.1 (H) 10/14/2021    HGB 10.9 (L) 10/14/2021    HCT 33.9 (L) 10/14/2021    MCV 83.3 10/14/2021     10/14/2021     Lab Results   Component Value Date    CREATININE 2.6 (H) 10/14/2021    BUN 55 (H) 10/14/2021     10/14/2021    K 5.6 (H) 10/14/2021    CL 97 10/14/2021    CO2 37 (H) 10/14/2021       Hepatic Function Panel:  Lab Results   Component Value Date    ALKPHOS 102 10/14/2021    ALT 18 10/14/2021    AST 40 10/14/2021    PROT 4.6 10/14/2021    BILITOT 0.4 10/14/2021    LABALBU 2.4 10/14/2021   Specimen Source: Urine, clean catch Urine Culture, RoutineNo growth 24 hours Narrative:  ORDER#: [id number]                          ORDERED BY: ALYSA TELLO   SOURCE: Urine Clean Catch                  COLLECTED:  10/11/21 13:32   ANTIBIOTICS AT JOSE.:                      RECEIVED :  10/11/21 13:32 Culture, Blood 2 [[id number]] Collected: 10/11/21 1458 Updated: 10/13/21 0615 Specimen Type: Hand, Left Specimen Source: Blood Culture, Blood 2No Growth to date. Any change in status will be called.  Narrative:  ORDER#: [id number]                          ORDERED BY: ALYSA TELLO   SOURCE: Blood Left Hand                    COLLECTED:  10/11/21 14:58   ANTIBIOTICS AT JOSE.:                      RECEIVED :  10/11/21 14:58   Ferritin [[id number]] (Abnormal) Collected: 10/13/21 0516 Updated: 10/13/21 0603 Specimen Source: Blood Ferritin2,000.0High   10/13/2021  6:03 AM - Milka Knowles Incoming Lab Results From Soft    Component Value Ref Range & Units Status Collected Lab   Ferritin 2,000.0High             Sputum culture methicillin sensitive staph aureus  IMPRESSION:    · Critical COVID-19 pneumonia, worsening  · Acute respiratory failure with severe hypoxia, requiring intubation  · Superimposed bacterial pneumonia with methicillin sensitive staph aureus  · Drug rash possibly related to vancomycin  · Hypercoagulable state  · Probable cytokine storm/sepsis  · History of asthma    Patient Active Problem List   Diagnosis    Seasonal allergies    Acquired hypothyroidism    Hypogonadism, male    Hyperlipidemia    Chronic bilateral low back pain without sciatica    Intermittent asthma    Irritable bowel syndrome with diarrhea    Pneumonia due to 2019 novel coronavirus    Acute respiratory failure with hypoxia (HCC)    Hypercoagulable state ([address]    Sepsis due to other etiology ([address]    Healthcare associated bacterial pneumonia       PLAN:  · Repeat blood cultures, Sputum culture   · IV meropenem  · Continue Diflucan  · status post Actemra 640 mg IV on 10/05  · Status post 10 days remdesivir, start date 10/01  · Decadron and anticoagulation as ordered  · Follow-up CBC complete metabolic profile  · Vent support and weaning as tolerated  · Continue ICU monitoring  ·       Hilary Olivia MD
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
## 2021-10-14 NOTE — PROGRESS NOTES
Hospitalist Progress Note      PCP: Dali Caballero MD    Date of Admission: 10/1/2021    Chief Complaint:    Chief Complaint   Patient presents with    Shortness of Breath     patient with shortness of breath covid positive 87 percent Ra     Subjective:  Patient is intubated and pone; unable to complete full 12 point ROS     Medications:  Reviewed    Infusion Medications    norepinephrine 30 mcg/min (10/14/21 1432)    vasopressin (Septic Shock) infusion 0.03 Units/min (10/14/21 1114)    cisatracurium Besylate 6 mcg/kg/min (10/14/21 1141)    [Held by provider] heparin (PORCINE) Infusion Stopped (10/14/21 0750)    insulin 3.43 Units/hr (10/14/21 1246)    fentaNYL (SUBLIMAZE) infusion 125 mcg/hr (10/14/21 0900)    propofol 40 mcg/kg/min (10/14/21 1430)    dextrose      sodium chloride       Scheduled Medications    metoclopramide  10 mg IntraVENous Q8H    lactulose  20 g Oral TID    bisacodyl  10 mg Rectal Daily    sodium zirconium cyclosilicate  10 g Oral BID    [Held by provider] furosemide  40 mg IntraVENous TID    meropenem  1,000 mg IntraVENous Q8H    polyethylene glycol  17 g Oral Daily    fluconazole  400 mg IntraVENous Q24H    chlorhexidine  15 mL Mouth/Throat BID    [Held by provider] insulin lispro  0-12 Units SubCUTAneous Q4H    docusate  100 mg Per NG tube BID    pantoprazole  40 mg IntraVENous Daily    And    sodium chloride (PF)  10 mL IntraVENous Daily    sodium chloride flush  5-40 mL IntraVENous 2 times per day    levothyroxine  75 mcg Oral Daily    [Held by provider] clomiPHENE  50 mg Oral Daily    [Held by provider] traZODone  50 mg Oral Nightly    [Held by provider] budesonide-formoterol  2 puff Inhalation BID    [Held by provider] tiotropium  2 puff Inhalation Daily     PRN Meds: heparin (porcine), heparin (porcine), diphenhydrAMINE, labetalol, glucose, glucagon (rDNA), hydrOXYzine, dextrose, dextrose, sodium chloride flush, sodium chloride, ondansetron **OR** ondansetron, polyethylene glycol, acetaminophen **OR** acetaminophen, albuterol sulfate HFA, HYDROcodone-homatropine      Intake/Output Summary (Last 24 hours) at 10/14/2021 1553  Last data filed at 10/14/2021 0719  Gross per 24 hour   Intake 3262 ml   Output 2695 ml   Net 567 ml     Exam:    /78   Pulse 112   Temp 96.8 °F (36 °C) (Bladder)   Resp 30   Ht 6' 1\" (1.854 m)   Wt 289 lb 0.4 oz (131.1 kg)   SpO2 92%   BMI 38.13 kg/m²     General appearance: intubated and prone  HEENT: Conjunctivae/corneas clear. Neck: Trachea midline. Respiratory:  Ventilated; chest tubes in place  Cardiovascular: Tachycardic  Abdomen: Deferred  Musculoskeletal: No clubbing, cyanosis or edema bilaterally. Neuro: sedated  Capillary Refill: Brisk,< 3 seconds   Peripheral Pulses: +2 palpable, equal bilaterally     Labs:   Recent Labs     10/13/21  1814 10/13/21  2230 10/14/21  0507 10/14/21  1100 10/14/21  1350   WBC 26.0*  --  23.4*  --  37.1*   HGB 14.5   < > 11.8* 13.3* 10.9*   HCT 45.7  --  36.5*  --  33.9*     --  175  --  242    < > = values in this interval not displayed. Recent Labs     10/12/21  0506 10/12/21  0859 10/13/21  0516 10/13/21  0952 10/14/21  0444 10/14/21  0506 10/14/21  1100     --  139  --   --  138  --    K 5.4*  --  5.7*  --   --  5.6*  --    CL 97  --  96  --   --  97  --    CO2 27  --  38*  --   --  37*  --    BUN 41*  --  40*  --   --  55*  --    CREATININE 1.18   < > 0.97   < > 1.5* 1.09 2.6*   CALCIUM 8.8  --  8.7  --   --  8.4*  --     < > = values in this interval not displayed. Recent Labs     10/12/21  0506 10/13/21  0516 10/14/21  0506   AST 32 33 40   ALT 24 18 18   BILITOT 0.3 0.3 0.4   ALKPHOS 110* 92 102     Recent Labs     10/13/21  0927   INR 1.2     No results for input(s): CKTOTAL, TROPONINI in the last 72 hours.     Urinalysis:    No results found for: Carilyn Parcel, BACTERIA, RBCUA, BLOODU, SPECGRAV, [place] [address]    Radiology:  XR CHEST PORTABLE   Final Result There are 2 chest tubes on the right. There is no pneumothorax. There are diffuse alveolar opacities throughout both lungs indicating diffuse infection, pneumonia. XR CHEST PORTABLE   Final Result      INTERVAL INSERTION OF 2 RIGHT THORACOSTOMY TUBES, WITH MILD/MODERATE SUBCUTANEOUS EMPHYSEMA AT THE INSERTION SITE RIGHT MID CHEST WALL, AND SMALL RIGHT PNEUMOTHORAX. PERSISTENT BUT INTERVAL IMPROVEMENT RIGHT LUNG ATELECTASIS/PNEUMONIA VERSUS EDEMA. INTERVAL PROGRESSION LEFT LUNG ATELECTASIS/PNEUMONIA VERSUS EDEMA WITH NO CHANGE, SMALL LEFT APICAL PNEUMOTHORAX. XR CHEST PORTABLE   Final Result   There are moderate to severe bilateral alveolar opacities, pneumonia. There is a left chest tube in place and a small left pneumothorax. XR CHEST PORTABLE   Final Result   NO SIGNIFICANT INTERVAL CHANGE      XR CHEST PORTABLE   Final Result   There are changes previous study with patchy alveolar opacities throughout both lungs consistent with the given diagnosis of COVID-19 pneumonia. XR CHEST PORTABLE   Final Result   There are alveolar opacities which may represent early infiltrates, pneumonia. The differential includes COVID-19 pneumonia versus other etiologies            XR CHEST PORTABLE   Final Result      INTERVAL PLACEMENT RIGHT CHEST TUBE IN EXPECTED POSITION. APPARENT RESOLUTION OF THE PREVIOUSLY DEMONSTRATED RIGHT PNEUMOTHORAX. OTHERWISE, STABLE CHEST FROM EARLIER 10/8/2021. XR CHEST PORTABLE   Final Result      XR CHEST PORTABLE   Final Result      Bilateral pneumonia appears progressed when compared to prior chest radiograph of September 29, 2021, possibly in part secondary to suboptimal inspiration. CTA Chest W WO  (PE study)   Final Result   1. LIMITED EXAMINATION DUE TO SUBOPTIMAL OPACIFICATION.  WITHIN LIMITS EXAMINATION NO CENTRAL OR PROXIMAL PULMONARY EMBOLI.   2. PATCHY MULTIFOCAL TO COALESCENT AIRSPACE DISEASE care, normal and abnormal findings, and treatment plan. All of pt questions were answered. Counseling, diet and education were  provided. Case will be discussed with nursing staff when appropriate. Family will be updated if and when appropriate. Diet: Diet NPO  ADULT TUBE FEEDING; Orogastric; Renal Formula; Continuous; 20; No; 50; Q 4 hours; Protein;  Add 1 proteinex with water flush TID    Code Status: Full Code    Electronically signed by Eren Uribe MD on 10/14/2021 at 3:53 PM

## 2021-10-14 NOTE — PROGRESS NOTES
Apparently bleeding around chest tube stopped when hep gtt stopped, but restarted this AM when hep restarted. Now bleeding from multiple areas per nursing. Hep gtt on hold again. Sutures placed around chest tube site yesterday when bleeding initially started. Leave chest tube in place for now. If bleeding continues, then may be forced to remove tube and suture closed skin.

## 2021-10-15 PROBLEM — E44.0 MODERATE MALNUTRITION (HCC): Status: ACTIVE | Noted: 2021-01-01

## 2021-10-15 NOTE — PROGRESS NOTES
Comprehensive Nutrition Assessment    Type and Reason for Visit:  Reassess    Nutrition Recommendations/Plan:   Continue NPO    Nutrition Assessment:  Pt meets moderate-severe malnutrition related to critical illness ( COVID-19 with intubation), day 8 on vent,unable to meet nutrition needs due to proning schedule. TF now on hold due to decline in medical condition and increase in vasopressors    Malnutrition Assessment:  Malnutrition Status: Moderate malnutrition    Context:  Acute Illness     Findings of the 6 clinical characteristics of malnutrition:  Energy Intake:  7 - 50% or less of estimated energy requirements for 5 or more days  Weight Loss:  Unable to assess     Body Fat Loss:  Unable to assess     Muscle Mass Loss:  Unable to assess    Fluid Accumulation:  1 - Mild Generalized   Strength:  Not Performed    Estimated Daily Nutrient Needs:  Energy (kcal):  5253-4800 (kg x 11-14); Weight Used for Energy Requirements:  Current (121.2 kg)     Protein (g):  168 gm (kg IBW x 2.0);  Weight Used for Protein Requirements:  Ideal (84 kg)        Fluid (ml/day):  ~2300; Method Used for Fluid Requirements:  1 ml/kcal      Nutrition Related Findings:  Pt intubated (10/8) wtih poor po prior, generalized edema and motteling of skin, meds noted, labs reviewd ( worsening renal labs)      Wounds:  None       Current Nutrition Therapies:    Diet NPO    Anthropometric Measures:  · Height: 6' 1\" (185.4 cm)  · Current Body Weight: 289 lb (131.1 kg) (10/13)   · Admission Body Weight: 290 lb (131.5 kg) (stated)    · Usual Body Weight: 292 lb (132.5 kg) (4/21)     · Ideal Body Weight: 184 lbs;   · BMI: 38.1  · BMI Categories: Obese Class 2 (BMI 35.0 -39.9)       Nutrition Diagnosis:   · Inadequate oral intake related to impaired respiratory function, altered GI function as evidenced by intubation    · Moderate malnutrition, In context of acute illness or injury related to catabolic illness, acute injury/trauma, impaired respiratory function as evidenced by intubation, localized or generalized fluid accumulation      Nutrition Interventions:   Food and/or Nutrient Delivery:  Continue NPO  Nutrition Education/Counseling:  Education not indicated   Coordination of Nutrition Care:  No recommendation at this time    Goals:  New goal with initiation of TF: EN to meet estimated nutrition needs, +BM       Nutrition Monitoring and Evaluation:   Behavioral-Environmental Outcomes:  None Identified   Food/Nutrient Intake Outcomes:  Enteral Nutrition Intake/Tolerance  Physical Signs/Symptoms Outcomes:  Biochemical Data, GI Status, Fluid Status or Edema, Weight     Discharge Planning:     Too soon to determine     Electronically signed by Luis Eduardo Cardoza RD, LD on 10/15/21 at 12:18 PM EDT no

## 2021-10-15 NOTE — PROGRESS NOTES
Renal Adjustment Per Protocol: Merrem 1 gm IV q 8 hours changed to Merrem 1 gm IV q 12 hours based on    Recent Labs     10/15/21  0414   CREATININE 3.47*   Estimated Creatinine Clearance: 36 mL/min (A) (based on SCr of 3.47 mg/dL (H)). Jesus Manuel Honeycutt

## 2021-10-15 NOTE — PROGRESS NOTES
Infectious Diseases Inpatient Progress Note          HISTORY OF PRESENT ILLNESS:  Follow up COVID-19 pneumonia with acute respiratory failure and hypoxia on IV remdesivir, status post tocilizumab, well tolerated. Patient is intubated, on assist control 100% with oxygen saturation of 50%. He is on high-dose Levophed and bicarb drip  Patient coded last night  Wife is at bedside and tears and bagging to give him any new medication that might be helpful  current Medications:     hydrocortisone sodium succinate PF  50 mg IntraVENous Q6H    [START ON 10/16/2021] anidulafungin (ERAXIS) 100 mg IVPB  100 mg IntraVENous Daily    meropenem  1,000 mg IntraVENous Q12H    sodium bicarbonate        metoclopramide  10 mg IntraVENous Q8H    lactulose  20 g Oral TID    bisacodyl  10 mg Rectal Daily    sodium zirconium cyclosilicate  10 g Oral BID    daptomycin (CUBICIN) IVPB  4 mg/kg (Ideal) IntraVENous Q24H    polyethylene glycol  17 g Oral Daily    chlorhexidine  15 mL Mouth/Throat BID    [Held by provider] insulin lispro  0-12 Units SubCUTAneous Q4H    docusate  100 mg Per NG tube BID    pantoprazole  40 mg IntraVENous Daily    And    sodium chloride (PF)  10 mL IntraVENous Daily    sodium chloride flush  5-40 mL IntraVENous 2 times per day    levothyroxine  75 mcg Oral Daily    [Held by provider] clomiPHENE  50 mg Oral Daily    [Held by provider] traZODone  50 mg Oral Nightly    [Held by provider] budesonide-formoterol  2 puff Inhalation BID    [Held by provider] tiotropium  2 puff Inhalation Daily       Allergies:  Aleve [naproxen]      Review of Systems  unable to provide ROS because of intubation      Physical Exam  Vitals:    10/15/21 0700 10/15/21 0731 10/15/21 0746 10/15/21 0800   BP: (!) 112/32      Pulse: 119 116 116 117   Resp:       Temp:       TempSrc:       SpO2: (!) 70% (!) 74% (!) 75%    Weight:       Height:         General Appearance: Sedated and intubated.   Assist control 100%  Skin: warm and dry, fading rash over the upper back. Head: normocephalic and atraumatic  Eyes: anicteric sclerae  ENT:  normal mucous membranes. Intact ET and OG  Lungs: Diminished breath sounds bilateral lung fields with bilateral basilar rales, intact R chest tube  Heart normal S1-S2 no murmur, tachycardic  Abdomen: soft, no distention   +1 leg edema  No erythema  Low urine output  Mottling of bilateral feet    DATA:    Lab Results   Component Value Date    WBC 40.5 (H) 10/15/2021    HGB 5.2 (LL) 10/15/2021    HCT 27.9 (L) 10/15/2021    MCV 85.9 10/15/2021     10/15/2021     Lab Results   Component Value Date    CREATININE 3.9 (H) 10/15/2021    BUN 75 (HH) 10/15/2021     (L) 10/15/2021    K 5.6 (H) 10/15/2021    CL 87 (L) 10/15/2021    CO2 33 (H) 10/15/2021       Hepatic Function Panel:  Lab Results   Component Value Date    ALKPHOS 126 10/15/2021    ALT 1,247 10/15/2021    AST 2,681 10/15/2021    PROT 4.3 10/15/2021    BILITOT 0.6 10/15/2021    LABALBU 2.5 10/15/2021   Specimen Source: Urine, clean catch Urine Culture, RoutineNo growth 24 hours Narrative:  ORDER#: [id number]                          ORDERED BY: ALYSA TELLO   SOURCE: Urine Clean Catch                  COLLECTED:  10/11/21 13:32   ANTIBIOTICS AT JOSE.:                      RECEIVED :  10/11/21 13:32 Culture, Blood 2 [[id number]] Collected: 10/11/21 1458 Updated: 10/13/21 0615 Specimen Type: Hand, Left Specimen Source: Blood Culture, Blood 2No Growth to date. Any change in status will be called.  Narrative:  ORDER#: [id number]                          ORDERED BY: ALYSA TELLO   SOURCE: Blood Left Hand                    COLLECTED:  10/11/21 14:58   ANTIBIOTICS AT JOSE.:                      RECEIVED :  10/11/21 14:58   Ferritin [[id number]] (Abnormal) Collected: 10/13/21 0516 Updated: 10/13/21 0603 Specimen Source: Blood Ferritin2,000.0High   10/13/2021  6:03 AM - Benson, [place] Incoming Lab Results From Soft    Component Value Ref Range & Units

## 2021-10-15 NOTE — SIGNIFICANT EVENT
Rapid response note    Rapid response was called because patient saturation was 80%. Patient is COVID-19 positive and proned. He is on 18 PEEP with plateau 32. Any changes to his position will drop his saturation significantly. Different attempts to change ventilator settings did not improve saturation. Gradually patient saturation dropped to the high 70s. Patient has very poor prognosis and his chance of survival is extremely low. His wife was contacted and she would like the patient to stay full code. She would like everything done if patient's heart stops. Dr. Pippa Felix  was updated.     CC time 31 minutes    Electronically signed by Brenda Del Cid MD on 10/14/2021 at 10:05 PM
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
## 2021-10-15 NOTE — PLAN OF CARE
Nutrition Problem #1: Inadequate oral intake  Intervention: Food and/or Nutrient Delivery: Continue NPO  Nutritional Goals: New goal with initiation of TF: EN to meet estimated nutrition needs, +BM

## 2021-10-15 NOTE — PROGRESS NOTES
Pulmonary & Critical Care Medicine ICU Progress Note  Chief complaint : COVID-19 pneumonia/acute hypoxic respiratory failure    Subjunctive/24 hour events :   Patient seen and examined during multidisciplinary rounds with RN, charge nurse, RT, pharmacy, dietitian, and social service. Not doing good, night was difficult on him, he is currently on epinephrine 50 mcg/min, Levophed 100 mcg/min, vasopressin 0.03 units/min, on fentanyl drip, he was on propofol this morning which I discontinued, he is on rocuronium drip, and on insulin drip. He is hypoxic, despite maximum vent support, his sat when I arrived was in the low 60s sometimes dips down to 59 I tried APRV which did not help, then I did recruitment maneuver PEEP of 40 for 40 seconds and back on assist control PEEP of 18 and 100% FiO2, he improved to low 70s and maintaining.   Urine output 1000 cc, is +4.3 L,no bowel movement, no vomiting    Social History     Tobacco Use    Smoking status: Never Smoker    Smokeless tobacco: Never Used   Substance Use Topics    Alcohol use: Yes         Problem Relation Age of Onset    Arthritis Mother         RA, Fibromyalgia    Heart Disease Mother         92% blockage in heart, stent    Heart Disease Father     High Blood Pressure Father     High Cholesterol Father     Diabetes Father     Cancer Father         throat    Diabetes Brother        Recent Labs     10/14/21  2116 10/15/21  0335   PHART 7.402 7.080*   [id number] 58* 89*   PO2ART 62* 54*       MV Settings:  Vent Mode: AC/VC Rate Set: 30 bmp/Vt Ordered: 340 mL/ /FiO2 : 100 %           IV:   norepinephrine (LEVOPHED) infusion (double concentration)      vasopressin (Septic Shock) infusion 0.03 Units/min (10/14/21 1114)    rocuronium infusion 8 mcg/kg/min (10/15/21 0445)    sodium bicarbonate infusion 150 mL/hr at 10/14/21 2000    EPINEPHrine infusion (double concentration) 50 mcg/min (10/15/21 0658)    [Held by provider] heparin (PORCINE) Infusion Stopped (10/14/21 0750)    insulin 15.18 Units/hr (10/15/21 0746)    fentaNYL (SUBLIMAZE) infusion 200 mcg/hr (10/15/21 0742)    propofol 30 mcg/kg/min (10/15/21 0700)    dextrose      sodium chloride         Vitals:  BP (!) 108/46   Pulse 116   Temp 100 °F (37.8 °C) (Bladder)   Resp 30   Ht 6' 1\" (1.854 m)   Wt 289 lb 0.4 oz (131.1 kg)   SpO2 (!) 75%   BMI 38.13 kg/m²    Tmax:        Intake/Output Summary (Last 24 hours) at 10/15/2021 0814  Last data filed at 10/15/2021 0400  Gross per 24 hour   Intake 1350 ml   Output 1015 ml   Net 335 ml       EXAM:  General: On vent, sedated, paralyzed, in prone position  Head: normocephalic, atraumatic  Eyes:No gross abnormalities. ENT:  MMM no lesions, ET and OG tube in  Neck:  supple and no masses  Chest : Vent sounds, bilateral rales, chest tube on the right, no significant subcutaneous air. Heart[de-identified] Heart sounds are normal.  Regular rate and rhythm without murmur, gallop or rub. ABD:  symmetric, soft, non-tender, no guarding or rebound  Musculoskeletal : no cyanosis, no clubbing and no edema  Neuro:   Sedated and paralyzed in prone position  Skin: No rashes or nodules noted.   Lymph node:  no cervical nodes  Urology: Yes [facility]calf   Psychiatric: Sedated    Medications:  Scheduled Meds:   metoclopramide  10 mg IntraVENous Q8H    lactulose  20 g Oral TID    bisacodyl  10 mg Rectal Daily    sodium zirconium cyclosilicate  10 g Oral BID    daptomycin (CUBICIN) IVPB  4 mg/kg (Ideal) IntraVENous Q24H    [Held by provider] furosemide  40 mg IntraVENous TID    meropenem  1,000 mg IntraVENous Q8H    polyethylene glycol  17 g Oral Daily    fluconazole  400 mg IntraVENous Q24H    chlorhexidine  15 mL Mouth/Throat BID    [Held by provider] insulin lispro  0-12 Units SubCUTAneous Q4H    docusate  100 mg Per NG tube BID    pantoprazole  40 mg IntraVENous Daily    And    sodium chloride (PF)  10 mL IntraVENous Daily    sodium chloride flush  5-40 mL IntraVENous 2 times per day    levothyroxine  75 mcg Oral Daily    [Held by provider] clomiPHENE  50 mg Oral Daily    [Held by provider] traZODone  50 mg Oral Nightly    [Held by provider] budesonide-formoterol  2 puff Inhalation BID    [Held by provider] tiotropium  2 puff Inhalation Daily       PRN Meds:  heparin (porcine), heparin (porcine), diphenhydrAMINE, labetalol, glucose, glucagon (rDNA), hydrOXYzine, dextrose, dextrose, sodium chloride flush, sodium chloride, ondansetron **OR** ondansetron, polyethylene glycol, acetaminophen **OR** acetaminophen, albuterol sulfate HFA, HYDROcodone-homatropine    Results: reviewed by me   CBC:   Recent Labs     10/14/21  0507 10/14/21  1100 10/14/21  1350 10/14/21  1826 10/14/21  2116 10/15/21  0335 10/15/21  0414   WBC 23.4*  --  37.1*  --   --   --  40.5*   HGB 11.8*   < > 10.9*   < > 11.0* 11.0* 8.6*   HCT 36.5*  --  33.9*  --   --   --  27.9*   MCV 82.8  --  83.3  --   --   --  85.9     --  242  --   --   --  192    < > = values in this interval not displayed. BMP:   Recent Labs     10/14/21  0506 10/14/21  1100 10/14/21  2100 10/14/21  2108 10/14/21  2116 10/15/21  0335 10/15/21  0414     --  137  --   --   --  133*   K 5.6*  --  5.9*  --   --   --  5.6*   CL 97  --  91*  --   --   --  87*   CO2 37*  --  30  --   --   --  33*   BUN 55*  --  72*  --   --   --  75*   CREATININE 1.09   < > 2.56*   < > 1.4* 5.0* 3.47*    < > = values in this interval not displayed.      LIVER PROFILE:   Recent Labs     10/14/21  0506 10/14/21  2100 10/15/21  0414   AST 40 508* 2,681*   ALT 18 347* 1,247*   BILITOT 0.4 0.8* 0.6   ALKPHOS 102 112* 126*     PT/INR:   Recent Labs     10/13/21  0927   PROTIME 15.3*   INR 1.2     APTT:   Recent Labs     10/13/21  0927   APTT >150.0*     UA:No results for input(s): NITRITE, COLORU, PHUR, LABCAST, WBCUA, RBCUA, MUCUS, TRICHOMONAS, YEAST, BACTERIA, CLARITYU, SPECGRAV, LEUKOCYTESUR, UROBILINOGEN, BILIRUBINUR, BLOODU, GLUCOSEU, AMORPHOUS in the last 72 hours.    Invalid input(s): KETONESU    Cultures:  MSSA in sputum  Films:  CXR reviewed by me and it showed persistent bilateral groundglass infiltrate, no pneumothorax        Assessment: This is a critically ill patient at risk of deterioration / death , needing close ICU monitoring and intervention due to below noted problems   · Severe acute hypoxic respiratory failure  · Severe COVID-19 infection  · Septic shock  · Shock liver  · KIRSTIE  · Obstructive sleep apnea on CPAP at home  · Worsening leukocytosis  · Obesity  · History of asthma  · Hyperkalemia      Recommendation  · Vent support lung protective strategy  · head of the bed 30°  · No sedation holiday for now  · Breathing trials when lung mechanics improve  · Sedation with combination propofol and fentanyl target R ASS of 0 to -1  · Watch for ICU delirium: TV on, natural light, avoid benzos, pain control, early mobility, and family engagement  · PUD prophylaxis  · DVT prophylaxis, resume heparin drip  · Currently in prone position, will continue same until lung mechanics improves for safe supine  · Completed 10 days of dexamethasone and 5 days of remdesivir  · Monitor blood sugar target 140180  · Continue insulin drip  · Levophed, epinephrine and vasopressin to maintain mean arterial pressure 65-70  · Continue Reglan  · Empiric micafungin   · Hold tube feed   · Continue empiric antibiotic  · Monitor potassium  · Chest x-ray reviewed, persistent bilateral groundglass infiltrate  · Monitor LFT   · Check INR   · Continue bicarb drip    Prognosis is poor, spoke with patient wife this morning reviewed current status with him patient is deteriorating fast, she will come and visit soon     Due to the immediate potential for life-threatening deterioration due to acute respiratory failure I spent 42  minutes providing critical care.  This time is excluding time spent performing procedures.           Electronically signed by Chrissy Nolasco MD,  [facility] ,on 10/15/2021 at

## 2021-10-15 NOTE — PROGRESS NOTES
[facility], at bedside in full PPE. Luis Eduardo Cardoso NP answering questions and discussing care, pt's wife requesting pt be started on hydroxychloroquine. Wife stated, \"you're just going to let him die because hydroxychloroquine isn't in the protocol\".

## 2021-10-15 NOTE — PROGRESS NOTES
Responded to code blue on this patient. ACLS protocol was followed; bicarb was given for acidosis which was likely the etiology. After 15 minutes discussed with patients wife and daughter; at that time the wife was distraught and unable to give an answer as to how they would like to proceed (they were outside the room witnessing). Daughter indicated they would like to reconsider after 30 minutes. ACLS was not stopped during this discussion and was continued with the help of the ICU NP. The patient continued to be in PEA during rhythm checks. Again went and spoke to the wife and she agreed to trying one more round prior to withdrawing care. Over 45 minutes of critical care time were spent on the care of this patient.     Prabhakar Zhou management and at surgeon's request

## 2021-10-15 NOTE — PROGRESS NOTES
Spiritual Care Services     Summary of Visit:   responded to a Code Blue. ICU staff attempted to resuscitate patient but he . Patient's family present for the and death. Family appeared shocked and tearful.  provided a supportive presence and at family requests also provided prayer. Family had questions related to the  and bereavement process.  provided guidance and gave the family a blue book. Spiritual Assessment/Intervention/Outcomes:    Encounter Summary  Services provided to[de-identified] Patient and family together  Referral/Consult From[de-identified] Nurse  Support System: Spouse, Children, Family members, Hinduism/maryanne community  Place of [facility]: [facility]  Contact Hinduism: Completed  Continue Visiting: No  Complexity of Encounter: High  Length of Encounter: 2 hours  Spiritual Assessment Completed: Yes  Advance Care Planning: Yes (ACP Note)  Routine  Type:  Follow up  Assessment: Calm, Approachable, Coping, Hopeful  Intervention: Active listening, Explored feelings, thoughts, concerns, Nurtured hope, Empowerment, Prayer, Discussed relationship with God, Discussed belief system/Jewish practices/maryanne, Discussed illness/injury and it's impact  Outcome: Comfort, Acceptance, Expressed gratitude, Engaged in conversation, Expressed feelings/needs/concerns, Encouraged  Crisis  Type: Code  Assessment: Unable to respond  Intervention: Sustaining presence/ Ministry of presence        Grief and Life Adjustment  Type: Death  Assessment: Tearful, Grieving, Anxious, Shock  Intervention: Prayer, Sustaining presence/ Ministry of presence, Grief care, End of life care  Outcome: Comfort, Coping, Tearful, Grieving  Primary Decision Maker (Healthcare Proxy)  [facility] is[de-identified] Legal Next of Kin           Values / Beliefs  Do you have any ethnic, cultural, sacramental, or spiritual Jewish needs you would like us to be aware of while you are in the hospital?: No [facility])    Care Plan:        Spiritual Care Services   Electronically signed by Jose A Carney on 10/15/21 at 6:14 PM EDT     To reach a  for emotional and spiritual support, place an [facility]'S \[facility]\"" consult request.   If a  is needed immediately, dial 0 and ask to page the on-call .

## 2021-10-15 NOTE — PROGRESS NOTES
Patient profoundly hypoxic throughout shift, Dr. Wm Man aware. Pt dropped into the 40%'s on o2 saturation at 1430, Monse NP notified and the decision to supinate the patient was made. Pt supined and BP & O2 saturation dropped immediately. Code blue called at 1438. ACLS and high-quality chest compressions started immediately. Code blue stopped at 1510 when the patient was pronounced by Dr. Dee Conroy. Family provided opportunity to say goodbye.

## 2021-10-17 LAB
BLOOD CULTURE, ROUTINE: NORMAL
CULTURE, BLOOD 2: NORMAL

## 2021-10-18 LAB
GFR AFRICAN AMERICAN: >60
GFR NON-AFRICAN AMERICAN: 53
PERFORMED ON: ABNORMAL
POC CREATININE: 1.4 MG/DL (ref 0.9–1.3)
POC SAMPLE TYPE: ABNORMAL

## 2021-10-20 LAB
HAV IGM SER IA-ACNC: NORMAL
HEPATITIS B CORE IGM ANTIBODY: NORMAL
HEPATITIS B SURFACE ANTIGEN INTERPRETATION: NORMAL
HEPATITIS B SURFACE ANTIGEN INTERPRETATION: NORMAL
HEPATITIS C ANTIBODY INTERPRETATION: NORMAL
HEPATITIS INTERPRETATION:: NORMAL

## 2023-02-27 NOTE — ED NOTES
Pt to CT scan via cart.       Vedia Severe, RN  10/01/21 114 rx 1/27/23  No apt  lov 3/7/22  Labs pt has order to do